# Patient Record
Sex: FEMALE | Race: BLACK OR AFRICAN AMERICAN | Employment: UNEMPLOYED | URBAN - METROPOLITAN AREA
[De-identification: names, ages, dates, MRNs, and addresses within clinical notes are randomized per-mention and may not be internally consistent; named-entity substitution may affect disease eponyms.]

---

## 2019-05-09 ENCOUNTER — HOSPITAL ENCOUNTER (OUTPATIENT)
Dept: WOMENS IMAGING | Age: 54
Discharge: HOME | End: 2019-05-09
Payer: COMMERCIAL

## 2019-05-09 DIAGNOSIS — Z12.31 ENCOUNTER FOR SCREENING MAMMOGRAM FOR BREAST CANCER: ICD-10-CM

## 2019-05-09 PROCEDURE — 77067 SCR MAMMO BI INCL CAD: CPT

## 2020-03-11 ENCOUNTER — HOSPITAL ENCOUNTER (INPATIENT)
Age: 55
LOS: 2 days | Discharge: HOME | DRG: 149 | End: 2020-03-13
Attending: EMERGENCY MEDICINE | Admitting: INTERNAL MEDICINE
Payer: COMMERCIAL

## 2020-03-11 ENCOUNTER — APPOINTMENT (OUTPATIENT)
Dept: CT IMAGING | Age: 55
DRG: 149 | End: 2020-03-11
Payer: COMMERCIAL

## 2020-03-11 ENCOUNTER — APPOINTMENT (OUTPATIENT)
Dept: GENERAL RADIOLOGY | Age: 55
DRG: 149 | End: 2020-03-11
Payer: COMMERCIAL

## 2020-03-11 PROBLEM — R42 DIZZINESS: Status: ACTIVE | Noted: 2020-03-11

## 2020-03-11 LAB
A/G RATIO: 1.2 (ref 1.1–2.2)
ALBUMIN SERPL-MCNC: 4.5 G/DL (ref 3.4–5)
ALP BLD-CCNC: 112 U/L (ref 40–129)
ALT SERPL-CCNC: 27 U/L (ref 10–40)
ANION GAP SERPL CALCULATED.3IONS-SCNC: 14 MMOL/L (ref 3–16)
APTT: 43.2 SEC (ref 24.2–36.2)
AST SERPL-CCNC: 25 U/L (ref 15–37)
BACTERIA: ABNORMAL /HPF
BASOPHILS ABSOLUTE: 0.1 K/UL (ref 0–0.2)
BASOPHILS RELATIVE PERCENT: 1.2 %
BILIRUB SERPL-MCNC: 0.7 MG/DL (ref 0–1)
BILIRUBIN URINE: ABNORMAL
BLOOD, URINE: ABNORMAL
BUN BLDV-MCNC: 11 MG/DL (ref 7–20)
CALCIUM SERPL-MCNC: 10.5 MG/DL (ref 8.3–10.6)
CHLORIDE BLD-SCNC: 98 MMOL/L (ref 99–110)
CLARITY: CLEAR
CO2: 24 MMOL/L (ref 21–32)
COLOR: ABNORMAL
COMMENT UA: ABNORMAL
CREAT SERPL-MCNC: 0.7 MG/DL (ref 0.6–1.1)
EOSINOPHILS ABSOLUTE: 0.1 K/UL (ref 0–0.6)
EOSINOPHILS RELATIVE PERCENT: 0.8 %
EPITHELIAL CELLS, UA: 8 /HPF (ref 0–5)
GFR AFRICAN AMERICAN: >60
GFR NON-AFRICAN AMERICAN: >60
GLOBULIN: 3.8 G/DL
GLUCOSE BLD-MCNC: 105 MG/DL (ref 70–99)
GLUCOSE URINE: NEGATIVE MG/DL
HCG QUALITATIVE: NEGATIVE
HCT VFR BLD CALC: 52.6 % (ref 36–48)
HEMOGLOBIN: 17.2 G/DL (ref 12–16)
HYALINE CASTS: 11 /LPF (ref 0–8)
INR BLD: 1.04 (ref 0.86–1.14)
KETONES, URINE: NEGATIVE MG/DL
LEUKOCYTE ESTERASE, URINE: NEGATIVE
LYMPHOCYTES ABSOLUTE: 1.8 K/UL (ref 1–5.1)
LYMPHOCYTES RELATIVE PERCENT: 25.4 %
MCH RBC QN AUTO: 30.5 PG (ref 26–34)
MCHC RBC AUTO-ENTMCNC: 32.7 G/DL (ref 31–36)
MCV RBC AUTO: 93 FL (ref 80–100)
MICROSCOPIC EXAMINATION: YES
MONOCYTES ABSOLUTE: 0.6 K/UL (ref 0–1.3)
MONOCYTES RELATIVE PERCENT: 9 %
MUCUS: ABNORMAL /LPF
NEUTROPHILS ABSOLUTE: 4.4 K/UL (ref 1.7–7.7)
NEUTROPHILS RELATIVE PERCENT: 63.6 %
NITRITE, URINE: NEGATIVE
PDW BLD-RTO: 14.3 % (ref 12.4–15.4)
PH UA: 5.5 (ref 5–8)
PLATELET # BLD: 309 K/UL (ref 135–450)
PMV BLD AUTO: 8.4 FL (ref 5–10.5)
POTASSIUM REFLEX MAGNESIUM: 4.7 MMOL/L (ref 3.5–5.1)
PROTEIN UA: 30 MG/DL
PROTHROMBIN TIME: 12.1 SEC (ref 10–13.2)
RBC # BLD: 5.66 M/UL (ref 4–5.2)
RBC UA: 14 /HPF (ref 0–4)
SODIUM BLD-SCNC: 136 MMOL/L (ref 136–145)
SPECIFIC GRAVITY UA: >=1.03 (ref 1–1.03)
TOTAL PROTEIN: 8.3 G/DL (ref 6.4–8.2)
TROPONIN: <0.01 NG/ML
URINE REFLEX TO CULTURE: YES
URINE TYPE: ABNORMAL
UROBILINOGEN, URINE: 1 E.U./DL
WBC # BLD: 6.9 K/UL (ref 4–11)
WBC UA: 9 /HPF (ref 0–5)

## 2020-03-11 PROCEDURE — 99285 EMERGENCY DEPT VISIT HI MDM: CPT

## 2020-03-11 PROCEDURE — 84703 CHORIONIC GONADOTROPIN ASSAY: CPT

## 2020-03-11 PROCEDURE — 71046 X-RAY EXAM CHEST 2 VIEWS: CPT

## 2020-03-11 PROCEDURE — 96374 THER/PROPH/DIAG INJ IV PUSH: CPT

## 2020-03-11 PROCEDURE — 85025 COMPLETE CBC W/AUTO DIFF WBC: CPT

## 2020-03-11 PROCEDURE — 2580000003 HC RX 258: Performed by: PHYSICIAN ASSISTANT

## 2020-03-11 PROCEDURE — 6370000000 HC RX 637 (ALT 250 FOR IP): Performed by: PHYSICIAN ASSISTANT

## 2020-03-11 PROCEDURE — 85610 PROTHROMBIN TIME: CPT

## 2020-03-11 PROCEDURE — 6370000000 HC RX 637 (ALT 250 FOR IP): Performed by: INTERNAL MEDICINE

## 2020-03-11 PROCEDURE — 2060000000 HC ICU INTERMEDIATE R&B

## 2020-03-11 PROCEDURE — 84484 ASSAY OF TROPONIN QUANT: CPT

## 2020-03-11 PROCEDURE — 87086 URINE CULTURE/COLONY COUNT: CPT

## 2020-03-11 PROCEDURE — 96361 HYDRATE IV INFUSION ADD-ON: CPT

## 2020-03-11 PROCEDURE — 93005 ELECTROCARDIOGRAM TRACING: CPT | Performed by: EMERGENCY MEDICINE

## 2020-03-11 PROCEDURE — 81001 URINALYSIS AUTO W/SCOPE: CPT

## 2020-03-11 PROCEDURE — 2580000003 HC RX 258: Performed by: INTERNAL MEDICINE

## 2020-03-11 PROCEDURE — 93005 ELECTROCARDIOGRAM TRACING: CPT | Performed by: PHYSICIAN ASSISTANT

## 2020-03-11 PROCEDURE — 80053 COMPREHEN METABOLIC PANEL: CPT

## 2020-03-11 PROCEDURE — 85730 THROMBOPLASTIN TIME PARTIAL: CPT

## 2020-03-11 PROCEDURE — 70450 CT HEAD/BRAIN W/O DYE: CPT

## 2020-03-11 PROCEDURE — 6360000002 HC RX W HCPCS: Performed by: PHYSICIAN ASSISTANT

## 2020-03-11 PROCEDURE — 6370000000 HC RX 637 (ALT 250 FOR IP): Performed by: EMERGENCY MEDICINE

## 2020-03-11 RX ORDER — SODIUM CHLORIDE 0.9 % (FLUSH) 0.9 %
10 SYRINGE (ML) INJECTION EVERY 12 HOURS SCHEDULED
Status: DISCONTINUED | OUTPATIENT
Start: 2020-03-11 | End: 2020-03-13 | Stop reason: HOSPADM

## 2020-03-11 RX ORDER — SODIUM CHLORIDE 0.9 % (FLUSH) 0.9 %
10 SYRINGE (ML) INJECTION PRN
Status: DISCONTINUED | OUTPATIENT
Start: 2020-03-11 | End: 2020-03-13 | Stop reason: HOSPADM

## 2020-03-11 RX ORDER — ONDANSETRON 2 MG/ML
4 INJECTION INTRAMUSCULAR; INTRAVENOUS EVERY 6 HOURS PRN
Status: DISCONTINUED | OUTPATIENT
Start: 2020-03-11 | End: 2020-03-13 | Stop reason: HOSPADM

## 2020-03-11 RX ORDER — NICOTINE 21 MG/24HR
1 PATCH, TRANSDERMAL 24 HOURS TRANSDERMAL DAILY
Status: DISCONTINUED | OUTPATIENT
Start: 2020-03-11 | End: 2020-03-13 | Stop reason: HOSPADM

## 2020-03-11 RX ORDER — POLYETHYLENE GLYCOL 3350 17 G/17G
17 POWDER, FOR SOLUTION ORAL DAILY PRN
Status: DISCONTINUED | OUTPATIENT
Start: 2020-03-11 | End: 2020-03-13 | Stop reason: HOSPADM

## 2020-03-11 RX ORDER — PROMETHAZINE HYDROCHLORIDE 25 MG/1
12.5 TABLET ORAL EVERY 6 HOURS PRN
Status: DISCONTINUED | OUTPATIENT
Start: 2020-03-11 | End: 2020-03-13 | Stop reason: HOSPADM

## 2020-03-11 RX ORDER — 0.9 % SODIUM CHLORIDE 0.9 %
1000 INTRAVENOUS SOLUTION INTRAVENOUS ONCE
Status: COMPLETED | OUTPATIENT
Start: 2020-03-11 | End: 2020-03-11

## 2020-03-11 RX ORDER — ATENOLOL 100 MG/1
TABLET ORAL
COMMUNITY
Start: 2020-02-27

## 2020-03-11 RX ORDER — ATENOLOL 50 MG/1
100 TABLET ORAL DAILY
Status: DISCONTINUED | OUTPATIENT
Start: 2020-03-12 | End: 2020-03-13 | Stop reason: HOSPADM

## 2020-03-11 RX ORDER — ATORVASTATIN CALCIUM 40 MG/1
40 TABLET, FILM COATED ORAL NIGHTLY
Status: DISCONTINUED | OUTPATIENT
Start: 2020-03-11 | End: 2020-03-13 | Stop reason: HOSPADM

## 2020-03-11 RX ORDER — ASPIRIN 81 MG/1
81 TABLET ORAL DAILY
Status: DISCONTINUED | OUTPATIENT
Start: 2020-03-11 | End: 2020-03-13 | Stop reason: HOSPADM

## 2020-03-11 RX ORDER — ASPIRIN 81 MG/1
324 TABLET, CHEWABLE ORAL ONCE
Status: COMPLETED | OUTPATIENT
Start: 2020-03-11 | End: 2020-03-11

## 2020-03-11 RX ORDER — ONDANSETRON 2 MG/ML
4 INJECTION INTRAMUSCULAR; INTRAVENOUS ONCE
Status: COMPLETED | OUTPATIENT
Start: 2020-03-11 | End: 2020-03-11

## 2020-03-11 RX ORDER — MECLIZINE HCL 12.5 MG/1
25 TABLET ORAL ONCE
Status: COMPLETED | OUTPATIENT
Start: 2020-03-11 | End: 2020-03-11

## 2020-03-11 RX ORDER — LABETALOL HYDROCHLORIDE 5 MG/ML
10 INJECTION, SOLUTION INTRAVENOUS EVERY 10 MIN PRN
Status: DISCONTINUED | OUTPATIENT
Start: 2020-03-11 | End: 2020-03-13 | Stop reason: HOSPADM

## 2020-03-11 RX ORDER — CLOPIDOGREL BISULFATE 75 MG/1
75 TABLET ORAL DAILY
Status: DISCONTINUED | OUTPATIENT
Start: 2020-03-11 | End: 2020-03-12

## 2020-03-11 RX ORDER — ASPIRIN 300 MG/1
300 SUPPOSITORY RECTAL DAILY
Status: DISCONTINUED | OUTPATIENT
Start: 2020-03-11 | End: 2020-03-13 | Stop reason: HOSPADM

## 2020-03-11 RX ADMIN — ONDANSETRON 4 MG: 2 INJECTION INTRAMUSCULAR; INTRAVENOUS at 12:02

## 2020-03-11 RX ADMIN — Medication 10 ML: at 22:04

## 2020-03-11 RX ADMIN — MECLIZINE 25 MG: 12.5 TABLET ORAL at 11:53

## 2020-03-11 RX ADMIN — ASPIRIN 81 MG 324 MG: 81 TABLET ORAL at 13:34

## 2020-03-11 RX ADMIN — DESMOPRESSIN ACETATE 40 MG: 0.2 TABLET ORAL at 22:04

## 2020-03-11 RX ADMIN — SODIUM CHLORIDE 1000 ML: 9 INJECTION, SOLUTION INTRAVENOUS at 12:01

## 2020-03-11 ASSESSMENT — ENCOUNTER SYMPTOMS
RESPIRATORY NEGATIVE: 1
NAUSEA: 1
COUGH: 0
PHOTOPHOBIA: 0
STRIDOR: 0
CONSTIPATION: 0
DIARRHEA: 0
ABDOMINAL PAIN: 0
COLOR CHANGE: 0
VOMITING: 0
CHEST TIGHTNESS: 0
WHEEZING: 0
BACK PAIN: 0
SHORTNESS OF BREATH: 0

## 2020-03-11 ASSESSMENT — PAIN SCALES - GENERAL: PAINLEVEL_OUTOF10: 0

## 2020-03-11 NOTE — ED NOTES
Bed: 11  Expected date:   Expected time:   Means of arrival: Walk In  Comments:     Audrey Figueroa [facility]  03/11/20 1134

## 2020-03-11 NOTE — ED PROVIDER NOTES
I personally evaluated and examined the patient in conjunction with the MICHAEL and agree with the assessment, treatment plan and disposition of the patient as recorded by the MICHAEL. I reviewed pertinent nursing notes, triage notes, vital signs, past medical history, family and social history, medications, and allergies. Complete review of systems was conducted by the MICHAEL and/or myself. Review of systems is negative except as documented in the history of present illness. Brief HPI: 78-year-old female presents the emergency department chief complaint of being off balance for the last 3 weeks. Went to see her PCP today who was concerned about posterior circulation stroke given the fact that she is quite off balance. She also reports some difficulties with coordination as well. Physical Exam: General: Patient is in no acute distress   Head: Normocephalic, atraumatic, pupils are equal and reactive to light. EOMI. Neck: Neck is supple. No JVD noted. Heart: RRR no murmurs, rubs, or gallops   Lungs: CTA BL   Abdomen: soft, non-tender, non-distended   Extremities: no lower extremity edema. Capillary refill is less than 2 seconds   Skin: no cyanosis or pallor; no rashes noted   Neuro: CN's 2-12 are grossly intact. No focal neurologic deficit appreciated. Alert and oriented ×3. Pupils are equal and reactive to light. Visual fields are tested and intact. Cranial nerves II through XII are tested and intact. No upper extremity drift. No lower extremity drift. Sensation is intact to light touch ×4 extremities. Muscle strength testing is 5/5×4 extremities. Finger to nose testing is normal. Normal intelligence and speech. According to MICHAEL patient with a very ataxic gait. Cardiac work-up with CT head ordered. Aspirin ordered. Recommend admission to the hospital for stroke work-up. EKG: EKG interpretation by ED physician: Normal axis, sinus rhythm at 68 bpm with T wave inversions from V3-V4.   No old EKG to compare this to. FINAL IMPRESSION     1. Vertigo - ro vbi    2. Abnormal EKG            Electronically signed by:   Irena Nguyen DO  03/11/20 9577

## 2020-03-11 NOTE — H&P
PO D 2/27/20  Yes Historical Provider, MD   Hydroquinone 4 % EMUL bid 4/15/15  Yes JACQUIE Stark CNP   atorvastatin (LIPITOR) 40 MG tablet TAKE 1 TABLET EVERY DAY 4/15/15  Yes JACQUIE Stark CNP   ergocalciferol (DRISDOL) 20488 UNITS capsule Take 1 capsule by mouth once a week. 12/11/13  Yes Yanique Nieto MD       Allergies:  Patient has no known allergies. Social History:      The patient currently lives home    TOBACCO:   reports that she has been smoking cigarettes. She has a 30.00 pack-year smoking history. She has never used smokeless tobacco.  ETOH:   reports current alcohol use of about 2.0 - 4.0 standard drinks of alcohol per week. E-Cigarettes Vaping or Juuling     Questions Responses    Vaping Use Never Assessed    Start Date     Does device contain nicotine? Never    Quit Date     Vaping Type             Family History:       Reviewed in detail and negative for DM, CAD, Cancer, CVA. Positive as follows:        Problem Relation Age of Onset    Stroke Mother     Cancer Father        REVIEW OF SYSTEMS:   Pertinent positives as noted in the HPI. All other systems reviewed and negative. PHYSICAL EXAM PERFORMED:    /71   Pulse 66   Temp 98.3 °F (36.8 °C) (Oral)   Resp 21   Ht 5' 4.96\" (1.65 m)   SpO2 94%   Breastfeeding No   BMI 30.22 kg/m²     General appearance:  No apparent distress, appears stated age and cooperative. HEENT:  Normal cephalic, atraumatic without obvious deformity. Pupils equal, round, and reactive to light. Extra ocular muscles intact. Conjunctivae/corneas clear. Neck: Supple, with full range of motion. No jugular venous distention. Trachea midline. Respiratory:  Normal respiratory effort. Clear to auscultation, bilaterally without Rales/Wheezes/Rhonchi. Cardiovascular:  Regular rate and rhythm with normal S1/S2 without murmurs, rubs or gallops. Abdomen: Soft, non-tender, non-distended with normal bowel sounds.   Musculoskeletal:  No clubbing, already on statin  Neurology consult  Patient symptoms can also be due to her history of alcohol consumption      History of hypertension  Continue with home medications    History of dyslipidemia  Continue statin    DVT Prophylaxis: Lovenox  Diet: Diet NPO Effective Now  Code Status: Full Code      Electronically signed by Raul Grimm MD on 3/11/2020 at 3:28 PM

## 2020-03-11 NOTE — ED PROVIDER NOTES
Denies any blood thinners. Patient states she is a smoker. Patient states she also has a history of hypertension/hyperlipidemia on medication. Patient states she has nausea when she feels dizzy but denies any vomiting. Denies chest pain, palpitations, shortness of breath, abdominal pain, numbness/tingling, lightheadedness, visual changes or other associated symptoms this time. Denies any feelings of weakness. 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 Denies any gross weakness. Nursing Notes were all reviewed and agreed with or any disagreements were addressed in the HPI. REVIEW OF SYSTEMS    (2-9 systems for level 4, 10 or more for level 5)     Review of Systems   Constitutional: Positive for activity change. Negative for appetite change, chills, diaphoresis, fatigue and fever. Eyes: Negative for photophobia and visual disturbance. Respiratory: Negative. Negative for cough, chest tightness, shortness of breath, wheezing and stridor. Cardiovascular: Negative. Negative for chest pain, palpitations and leg swelling. Gastrointestinal: Positive for nausea. Negative for abdominal pain, constipation, diarrhea and vomiting. Genitourinary: Negative for decreased urine volume, difficulty urinating, dysuria, flank pain, frequency, hematuria and urgency. Musculoskeletal: Positive for gait problem. Negative for arthralgias, back pain, joint swelling, myalgias, neck pain and neck stiffness. Skin: Negative for color change, pallor, rash and wound. Neurological: Positive for dizziness, speech difficulty and weakness. Negative for tremors, seizures, syncope, facial asymmetry, light-headedness, numbness and headaches. Positives and Pertinent negatives as per HPI. Except as noted above in the ROS, all other systems were reviewed and negative. PAST MEDICAL HISTORY     Past Medical History:   Diagnosis Date    Hyperlipidemia     Hypertension     Vitamin D deficiency          SURGICAL HISTORY   History reviewed. No pertinent surgical history. CURRENTMEDICATIONS       Previous Medications    ATENOLOL (TENORMIN) 100 MG TABLET    TK 1 T PO D    ATORVASTATIN (LIPITOR) 40 MG TABLET    TAKE 1 TABLET EVERY DAY    ERGOCALCIFEROL (DRISDOL) 47651 UNITS CAPSULE    Take 1 capsule by mouth once a week. HYDROQUINONE 4 % EMUL    bid         ALLERGIES     Patient has no known allergies. FAMILYHISTORY       Family History   Problem Relation Age of Onset    Stroke Mother     Cancer Father           SOCIAL HISTORY       Social History     Tobacco Use    Smoking status: Current Every Day Smoker     Packs/day: 1.00    Smokeless tobacco: Never Used   Substance Use Topics    Alcohol use: Yes     Comment: \"COUPLE DRINKS A WEEK\"    Drug use: No       SCREENINGS             PHYSICAL EXAM    (up to 7 for level 4, 8 or more for level 5)     ED Triage Vitals [03/11/20 1139]   BP Temp Temp src Pulse Resp SpO2 Height Weight   (!) 165/103 98.2 °F (36.8 °C) -- 74 16 98 % -- --       Physical Exam  Constitutional:       General: She is not in acute distress. Appearance: Normal appearance. She is well-developed. She is not ill-appearing, toxic-appearing or diaphoretic. HENT:      Head: Normocephalic and atraumatic. Right Ear: External ear normal.      Left Ear: External ear normal.      Mouth/Throat:      Mouth: Mucous membranes are moist.      Pharynx: No oropharyngeal exudate or posterior oropharyngeal erythema. Eyes:      General:         Right eye: No discharge. Left eye: No discharge. Extraocular Movements: Extraocular movements intact. Conjunctiva/sclera: Conjunctivae normal.      Pupils: Pupils are equal, round, and reactive to light. Neck:      Musculoskeletal: Normal range of motion and neck supple.  No neck rigidity or atelectasis         CT Head WO Contrast   Final Result   No acute intracranial abnormality. No results found. PROCEDURES   Unless otherwise noted below, none     Procedures    CRITICAL CARE TIME   N/A    CONSULTS:  IP CONSULT TO HOSPITALIST      EMERGENCY DEPARTMENT COURSE and DIFFERENTIAL DIAGNOSIS/MDM:   Vitals:    Vitals:    03/11/20 1200 03/11/20 1230 03/11/20 1300 03/11/20 1315   BP: 121/80 117/61 (!) 134/91 124/62   Pulse: 73 66 76 69   Resp: 15 12 13 18   Temp:       SpO2: 98% 94% 96% 95%       Patient was given the following medications:  Medications   aspirin chewable tablet 324 mg (has no administration in time range)   ondansetron (ZOFRAN) injection 4 mg (4 mg Intravenous Given 3/11/20 1202)   0.9 % sodium chloride bolus (0 mLs Intravenous Stopped 3/11/20 1315)   meclizine (ANTIVERT) tablet 25 mg (25 mg Oral Given 3/11/20 1153)       Patient is a 53-year-old female who presents to the ED with complaint of balance problems, slurred speech, dizziness and nausea. Symptoms ongoing for the past 3 weeks. Initially seen by PCP 3 weeks ago and diagnosed with peripheral dizziness. Was sent for physical therapy and completed 3 sessions of physical therapy for dizziness with no improvement. Has been on Zofran. Worsening symptoms with now slurred speech and saw PCP today who sent to the ED for evaluation of potential posterior cerebellar stroke. Did speak with PCP prior to patient's arrival.  PCP requested patient be admitted for further evaluation of stroke. Patient given Antivert, Zofran and fluids here in the ED for empiric treatment given complaints. CBC showed normal white count and platelets. Hemoglobin 17.2. CMP unremarkable. Troponin normal.  Coags obtained. Pregnancy negative. Chest x-ray showed bibasilar atelectasis. CT of the head showed no acute intracranial abnormality. Symptoms ongoing for the past 3 weeks and do not believe CTA of the head or neck indicated at this time. Believe patient does require admission for further evaluation of potential posterior circulation etiology and CVA. Believe patient would benefit from mission for MRI for further evaluation. Will discuss with hospitalist service for admission. FINAL IMPRESSION      1. Vertigo - ro vbi          DISPOSITION/PLAN   DISPOSITION Decision To Admit 03/11/2020 01:23:52 PM      PATIENT REFERREDTO:  No follow-up provider specified.     DISCHARGE MEDICATIONS:  New Prescriptions    No medications on file       DISCONTINUED MEDICATIONS:  Discontinued Medications    ATENOLOL (TENORMIN) 50 MG TABLET    TAKE 1 TABLET EVERY DAY              (Please note that portions of this note were completed with a voice recognition program.  Efforts were made to edit the dictations but occasionally words are mis-transcribed.)    ALBA Suero (electronically signed)          ALBA Pang  03/11/20 9029

## 2020-03-12 ENCOUNTER — APPOINTMENT (OUTPATIENT)
Dept: MRI IMAGING | Age: 55
DRG: 149 | End: 2020-03-12
Payer: COMMERCIAL

## 2020-03-12 LAB
CHOLESTEROL, TOTAL: 226 MG/DL (ref 0–199)
EKG ATRIAL RATE: 68 BPM
EKG ATRIAL RATE: 92 BPM
EKG DIAGNOSIS: NORMAL
EKG DIAGNOSIS: NORMAL
EKG P AXIS: 75 DEGREES
EKG P AXIS: 76 DEGREES
EKG P-R INTERVAL: 196 MS
EKG P-R INTERVAL: 200 MS
EKG Q-T INTERVAL: 238 MS
EKG Q-T INTERVAL: 450 MS
EKG QRS DURATION: 82 MS
EKG QRS DURATION: 84 MS
EKG QTC CALCULATION (BAZETT): 352 MS
EKG QTC CALCULATION (BAZETT): 478 MS
EKG R AXIS: 66 DEGREES
EKG R AXIS: 80 DEGREES
EKG T AXIS: 43 DEGREES
EKG T AXIS: 73 DEGREES
EKG VENTRICULAR RATE: 132 BPM
EKG VENTRICULAR RATE: 68 BPM
ESTIMATED AVERAGE GLUCOSE: 93.9 MG/DL
HBA1C MFR BLD: 4.9 %
HCT VFR BLD CALC: 46.6 % (ref 36–48)
HDLC SERPL-MCNC: 40 MG/DL (ref 40–60)
HEMOGLOBIN: 15.1 G/DL (ref 12–16)
LDL CHOLESTEROL CALCULATED: 164 MG/DL
MCH RBC QN AUTO: 30.1 PG (ref 26–34)
MCHC RBC AUTO-ENTMCNC: 32.5 G/DL (ref 31–36)
MCV RBC AUTO: 92.8 FL (ref 80–100)
PDW BLD-RTO: 14.4 % (ref 12.4–15.4)
PLATELET # BLD: 234 K/UL (ref 135–450)
PMV BLD AUTO: 8.5 FL (ref 5–10.5)
RBC # BLD: 5.02 M/UL (ref 4–5.2)
TRIGL SERPL-MCNC: 108 MG/DL (ref 0–150)
URINE CULTURE, ROUTINE: NORMAL
VLDLC SERPL CALC-MCNC: 22 MG/DL
WBC # BLD: 4.9 K/UL (ref 4–11)

## 2020-03-12 PROCEDURE — 97162 PT EVAL MOD COMPLEX 30 MIN: CPT

## 2020-03-12 PROCEDURE — 92523 SPEECH SOUND LANG COMPREHEN: CPT

## 2020-03-12 PROCEDURE — 93010 ELECTROCARDIOGRAM REPORT: CPT | Performed by: INTERNAL MEDICINE

## 2020-03-12 PROCEDURE — 97116 GAIT TRAINING THERAPY: CPT

## 2020-03-12 PROCEDURE — 2060000000 HC ICU INTERMEDIATE R&B

## 2020-03-12 PROCEDURE — 97530 THERAPEUTIC ACTIVITIES: CPT

## 2020-03-12 PROCEDURE — 85027 COMPLETE CBC AUTOMATED: CPT

## 2020-03-12 PROCEDURE — 92610 EVALUATE SWALLOWING FUNCTION: CPT

## 2020-03-12 PROCEDURE — 6370000000 HC RX 637 (ALT 250 FOR IP): Performed by: PHYSICIAN ASSISTANT

## 2020-03-12 PROCEDURE — 83036 HEMOGLOBIN GLYCOSYLATED A1C: CPT

## 2020-03-12 PROCEDURE — 80061 LIPID PANEL: CPT

## 2020-03-12 PROCEDURE — 94760 N-INVAS EAR/PLS OXIMETRY 1: CPT

## 2020-03-12 PROCEDURE — 6370000000 HC RX 637 (ALT 250 FOR IP): Performed by: INTERNAL MEDICINE

## 2020-03-12 PROCEDURE — 99222 1ST HOSP IP/OBS MODERATE 55: CPT | Performed by: PSYCHIATRY & NEUROLOGY

## 2020-03-12 PROCEDURE — 36415 COLL VENOUS BLD VENIPUNCTURE: CPT

## 2020-03-12 PROCEDURE — 2580000003 HC RX 258: Performed by: INTERNAL MEDICINE

## 2020-03-12 PROCEDURE — 70551 MRI BRAIN STEM W/O DYE: CPT

## 2020-03-12 RX ORDER — MECLIZINE HCL 12.5 MG/1
25 TABLET ORAL 3 TIMES DAILY PRN
Status: DISCONTINUED | OUTPATIENT
Start: 2020-03-12 | End: 2020-03-13

## 2020-03-12 RX ADMIN — Medication 10 ML: at 20:44

## 2020-03-12 RX ADMIN — Medication 10 ML: at 08:19

## 2020-03-12 RX ADMIN — DESMOPRESSIN ACETATE 40 MG: 0.2 TABLET ORAL at 20:43

## 2020-03-12 RX ADMIN — ASPIRIN 81 MG: 81 TABLET, COATED ORAL at 08:18

## 2020-03-12 RX ADMIN — ATENOLOL 100 MG: 50 TABLET ORAL at 08:18

## 2020-03-12 RX ADMIN — CLOPIDOGREL 75 MG: 75 TABLET, FILM COATED ORAL at 08:18

## 2020-03-12 RX ADMIN — MECLIZINE 25 MG: 12.5 TABLET ORAL at 09:48

## 2020-03-12 NOTE — PROGRESS NOTES
Occupational Therapy   Occupational Therapy Initial Assessment  Date: 3/12/2020   Patient Name: Fadia Rooney  MRN: [id number]     : 1965    Date of Service: 3/12/2020    Discharge Recommendations:Azalea Hidalgo scored a 17/24 on the AM-PAC ADL Inpatient form. Current research shows that an AM-PAC score of 17 or less is typically not associated with a discharge to the patient's home setting. Based on the patients AM-PAC score and their current ADL deficits, it is recommended that the patient have 5-7 sessions per week of Occupational Therapy at d/c to increase the patients independence. Patient with decreased safety awareness, recommend 24 hour supervision  If patient discharges prior to next session this note will serve as a discharge summary. Please see below for the latest assessment towards goals. OT Equipment Recommendations  Equipment Needed: No    Assessment   Performance deficits / Impairments: Decreased functional mobility ; Decreased ADL status; Decreased cognition;Decreased safe awareness;Decreased endurance;Decreased balance;Decreased high-level IADLs  Assessment: Patient presents with the above deficits, which are below baseline, and would benefit from continued skilled OT  Treatment Diagnosis: Decreased ADLs, IADLs, transfers, mobility associated with Dizziness  Prognosis: Good;Fair  Decision Making: Medium Complexity  History: ETOH, alone during day  Exam: as above  Assistance / Modification: decreased safety awareness, dizziness  OT Education: OT Role;Plan of Care;ADL Adaptive Strategies;IADL Safety;Equipment;Transfer Training  Patient Education: Eval, discharge recommendations--patient with decreased safety awareness, resistant to recommendations  REQUIRES OT FOLLOW UP: Yes  Activity Tolerance  Activity Tolerance: Treatment limited secondary to decreased cognition  Safety Devices  Safety Devices in place: Yes  Type of devices:  All fall risk precautions in place;Call light within reach;Chair alarm in place; Patient at risk for falls;Nurse notified; Left in chair;Gait belt           Patient Diagnosis(es): The primary encounter diagnosis was Vertigo - ro vbi. A diagnosis of Abnormal EKG was also pertinent to this visit. has a past medical history of Hyperlipidemia, Hypertension, and Vitamin D deficiency. has a past surgical history that includes Ankle surgery (Left) and Colonoscopy. Treatment Diagnosis: Decreased ADLs, IADLs, transfers, mobility associated with Dizziness      Restrictions  Restrictions/Precautions  Restrictions/Precautions: Fall Risk(High fall risk)    Subjective   General  Chart Reviewed:  Yes  Additional Pertinent Hx: ETOH  Family / Caregiver Present: No  Diagnosis: Dizziness  Subjective  Subjective: Patient long sitting in bed upon arrival, agreeable to evaluation  Patient Currently in Pain: Denies  Vital Signs  Temp: 98 °F (36.7 °C)  Temp Source: Oral  Pulse: 59  Heart Rate Source: Monitor  Resp: 16  BP: 115/76  BP Location: Right upper arm  BP Upper/Lower: Upper  Patient Position: Up in chair  Level of Consciousness: Alert  MEWS Score: 1  Patient Currently in Pain: Denies  Oxygen Therapy  SpO2: 98 %  Pulse Oximeter Device Mode: Intermittent  Pulse Oximeter Device Location: Finger  O2 Device: None (Room air)  Social/Functional History  Social/Functional History  Lives With: Spouse  Type of Home: House  Home Layout: Two level(Bi-Level)  Home Access: Stairs to enter with rails  Entrance Stairs - Number of Steps: 6+6  Entrance Stairs - Rails: Both  Bathroom Shower/Tub: Tub/Shower unit  Bathroom Toilet: Handicap height  Receives Help From: Family  ADL Assistance: Independent  Homemaking Assistance: Independent  Ambulation Assistance: Independent  Transfer Assistance: Independent  Active : Yes  Mode of Transportation: Truck  Occupation: Retired  Leisure & Hobbies: TV  Additional Comments: Falls: 1 fall in the last 3 months (within the last couple weeks) Objective   Vision: Impaired  Vision Exceptions: Wears glasses at all times  Hearing: Within functional limits    Orientation  Overall Orientation Status: Within Normal Limits     Balance  Sitting Balance: Contact guard assistance  Standing Balance: Minimal assistance(modA without AD, Kindra with RW)  Standing Balance  Time: ~125 feet with RW and Kindra (cues for proximity, patient having difficulty with navigation/way finding --unable to locate room)  Wheelchair Bed Transfers  Wheelchair/Bed - Technique: Ambulating  Equipment Used: Bed;Other  Level of Asssistance: Minimal assistance(with RW (cues for safety, proximity to RW, patient impulsive))  ADL  Feeding: None  Grooming: Setup  LE Dressing: Contact guard assistance(seated EOB)  Toileting: None  Tone RUE  RUE Tone: Normotonic  Tone LUE  LUE Tone: Normotonic  Coordination  Movements Are Fluid And Coordinated: Yes     Bed mobility  Supine to Sit: Contact guard assistance  Sit to Supine: Contact guard assistance  Scooting: Stand by assistance  Transfers  Sit to stand: Minimal assistance  Stand to sit: Minimal assistance  Vision - Basic Assessment  Prior Vision: Wears glasses all the time  Patient Visual Report: (No visual changes noted)  Cognition  Overall Cognitive Status: Exceptions  Arousal/Alertness: Appropriate responses to stimuli  Following Commands:  Follows one step commands with repetition(due to impulsivity)  Safety Judgement: Decreased awareness of need for assistance;Decreased awareness of need for safety  Problem Solving: Decreased awareness of errors;Assistance required to correct errors made;Assistance required to identify errors made;Assistance required to implement solutions;Assistance required to generate solutions  Insights: Decreased awareness of deficits  Initiation: Requires cues for some  Sequencing: Requires cues for some  Perception  Overall Perceptual Status: WFL     Sensation  Overall Sensation Status: WFL        LUE AROM (degrees)  LUE

## 2020-03-12 NOTE — CONSULTS
Comment: clairekierstenleonid daily    Drug use: No     No Known Allergies  Current Facility-Administered Medications   Medication Dose Route Frequency Provider Last Rate Last Dose    meclizine (ANTIVERT) tablet 25 mg  25 mg Oral TID PRN Kimber Lindsey PA-C   25 mg at 03/12/20 0948    atenolol (TENORMIN) tablet 100 mg  100 mg Oral Daily Stevan JONES MD   100 mg at 03/12/20 0818    atorvastatin (LIPITOR) tablet 40 mg  40 mg Oral Nightly Stevan JONES MD   40 mg at 03/11/20 2204    sodium chloride flush 0.9 % injection 10 mL  10 mL Intravenous 2 times per day Asif JONES MD   10 mL at 03/12/20 0819    sodium chloride flush 0.9 % injection 10 mL  10 mL Intravenous PRN Dmytro Rudi Brittle, MD        polyethylene glycol (GLYCOLAX) packet 17 g  17 g Oral Daily PRN Dmytro Rudi Brittle, MD        promethazine (PHENERGAN) tablet 12.5 mg  12.5 mg Oral Q6H PRN Stevan JONES MD        Or    ondansetron (ZOFRAN) injection 4 mg  4 mg Intravenous Q6H PRN Stevan JONES MD        enoxaparin (LOVENOX) injection 40 mg  40 mg Subcutaneous Daily Stevan JONES MD        aspirin EC tablet 81 mg  81 mg Oral Daily Stevan JONES MD   81 mg at 03/12/20 0818    Or    aspirin suppository 300 mg  300 mg Rectal Daily Dmytro Rudi Brittle, MD        nicotine (NICODERM CQ) 14 MG/24HR 1 patch  1 patch Transdermal Daily Dmytro Rudi Brittle, MD   Stopped at 03/11/20 2017    labetalol (NORMODYNE;TRANDATE) injection 10 mg  10 mg Intravenous Q10 Min PRN Stevan JONES MD           ROS : A 10-14 system review of constitutional, cardiovascular, respiratory, eyes, musculoskeletal, endocrine, GI, ENT, skin, hematological, genitourinary, psychiatric and neurologic systems was obtained and updated today and is unremarkable except as mentioned in my HPI      Exam:     Constitutional:   Vitals:    03/12/20 0722 03/12/20 0805 03/12/20 1158 03/12/20 1219   BP:  128/78  115/76   Pulse:  65  59   Resp:  16 16 16   Temp:  97.8 °F (36.6 °C)  98 °F (36.7 °C) Lab Results   Component Value Date    INR 1.04 03/11/2020    PROTIME 12.1 03/11/2020       Neuroimaging were independently reviewed by myself and discussed results with the patient  Reviewed notes from different physicians  Reviewed lab and blood testing    Impression:  Acute dizziness and vertigo. Severe. Possible benign paroxysmal vertigo or peripheral vertigo. Less likely central.  Recent MRI brain showed no acute findings. Hypertension, not controlled  Hyperlipidemia  Smoking    Recommendation:     Aspirin  Statin  Continue current blood pressure medication  Blood pressure monitor  PT and OT for vestibular training  Telemetry  DVT and GI prophylaxis  Nicotine patch  Stroke prevention was discussed with the patient  Discussed risk of vertigo, recurrence and driving precautions  Can use meclizine only as needed for prolonged attacks  Can be discharged from neurology if medically stable  Follow-up outpatient with OT if symptoms persist or recur  No further recommendation  We will sign off. Thank you for referring such patient. If you have any questions regarding my consult note, please don't hesitate to call me. Mercedes Vallejo MD  [phone]    This dictation was generated by voice recognition computer software.  Although all attempts are made to edit the dictation for accuracy, there may be errors in the  transcription that are not intended

## 2020-03-12 NOTE — PROGRESS NOTES
[facility] PROGRESS NOTE    3/12/2020 1:27 PM        Name: Modesto Sheppard . Admitted: 3/11/2020  Primary Care Provider: Pipe Kirby MD (Tel: [phone])      Subjective:  . Patient seen earlier this afternoon. She had meclizine this am. She states she feels 75% better since admission although therapy had just worked with her and said she was very unsteady. 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 She has been going to vestibular rehab x 3 visits. She drinks 2-4 shots a day but not every day. She denies tremors. She is having occasional nausea. Reviewed interval ancillary notes    Current Medications  meclizine (ANTIVERT) tablet 25 mg, TID PRN  atenolol (TENORMIN) tablet 100 mg, Daily  atorvastatin (LIPITOR) tablet 40 mg, Nightly  sodium chloride flush 0.9 % injection 10 mL, 2 times per day  sodium chloride flush 0.9 % injection 10 mL, PRN  polyethylene glycol (GLYCOLAX) packet 17 g, Daily PRN  promethazine (PHENERGAN) tablet 12.5 mg, Q6H PRN    Or  ondansetron (ZOFRAN) injection 4 mg, Q6H PRN  enoxaparin (LOVENOX) injection 40 mg, Daily  aspirin EC tablet 81 mg, Daily    Or  aspirin suppository 300 mg, Daily  nicotine (NICODERM CQ) 14 MG/24HR 1 patch, Daily  labetalol (NORMODYNE;TRANDATE) injection 10 mg, Q10 Min PRN        Objective:  /76   Pulse 59   Temp 98 °F (36.7 °C) (Oral)   Resp 16   Ht 5' 4\" (1.626 m)   Wt 150 lb (68 kg)   SpO2 98%   Breastfeeding No   BMI 25.75 kg/m²     Intake/Output Summary (Last 24 hours) at 3/12/2020 1327  Last data filed at 3/12/2020 1019  Gross per 24 hour   Intake 480 ml   Output 300 ml   Net 180 ml      Wt Readings from Last 3 Encounters:   03/12/20 150 lb (68 kg)   04/15/15 181 lb 6.4 oz (82.3 kg)   07/01/14 178 lb (80.7 kg)       General appearance:  Appears comfortable  Eyes: Sclera clear. Pupils equal.  ENT: Moist oral mucosa.  Trachea midline, no

## 2020-03-12 NOTE — PROGRESS NOTES
discharge  Short term goal 1: Pt will complete bed mobility with mod I  Short term goal 2: Pt will complete sit to/from stand with SBA  Short term goal 3: Pt will ambulate 100 ft with LRAD and CGA       Therapy Time   Individual Concurrent Group Co-treatment   Time In 1030         Time Out 1108         Minutes 38         Timed Code Treatment Minutes: [address], PT   Ana M Zhang, PT, DPT, [id number]

## 2020-03-12 NOTE — CARE COORDINATION
CM spoke w/ patient re: d/c. Per PT/OT/Speech recommendations. [facility] of choice [facility] list provided to patient. Patient amenable to [facility]. Referral sent. Case management will continue to follow progress and update discharge plan as needed.     Taylor Vieyra BSN, .[phone]

## 2020-03-12 NOTE — PROGRESS NOTES
Shift assessment complete, meds given whole with water. Pt has been very unsteady when ambulating, holds onto you or the wall. Is a high fall risk with alarm in place. Pt has some odd hand movements at times and shaking. Still getting an NIH:0. I saw noted in the ER Drs note that the pt is a daily vodka drinker. We have no CIWA protocol ordered. Will monitor pt for signs of withdrawal. Pt has been alert and oriented x4, calm and cooperative. Vitals have been stable. No other concerns, will continue to monitor.

## 2020-03-12 NOTE — PROGRESS NOTES
Slightly reduced laryngeal elevation assessed upon manual palpation. Pt demonstrated effective mastication and adequate oral clearance. No overt clinical s/s of aspiration/penetration noted across textures. At this time, recommend a regular texture diet with thin liquids. Dietary Recommendations:   Recommend Regular Texture diet with Thin Liquids, meds as tolerated     Strategies: 90 degree positioning with all p.o. intake; small bites/sips; alternate textures through meal; reduce rate of intake; No straws     Dysphagia Treatment/Goals: Speech therapy for dysphagia tx 1 time to ensure diet tolerance. ST.) Pt will tolerate recommended diet without s/s of aspiration     SPEECH LANGUAGE EVALUATION:  Speech Language Treatment Diagnosis: Mild dysarthria    Speech Language Impressions: Pt alert and cooperative. Pt presents with a mild dysarthria characterized by reduced articulatory precision. Pt relatively intelligible in conversation  Pt reports increased slurring last week with improvement this date, however still not at her baseline.      Oral Motor exam/Motor Speech:   - OME was grossly within functional limits    - Slightly hyponasal vocal quality    - Mildly reduced articulatory precision at the word, sentence and conversation level    - Verbal speech task (mama-mama, huckleberry-huckleberry): mild difficulty at the word level   - Overall functionally intelligible     Verbal Expression:    - Complex verbal task: no noted difficulty with verbal expression     - pt completed with 100% (5/5)    - Appears within functional limits     Auditory Comprehension:    - Pt responses are appropriate and timely in structured conversation    - Pt independently following commands for evaluation    - 1-step commands: 2/2     - 2-step commands: 6/6     - 3-step commands: 2/2    - Appears within functional limits     Cognitive-Linguistic:    - Oriented: self, date, facility, medical situation    - Pt able to report

## 2020-03-13 VITALS
BODY MASS INDEX: 25.22 KG/M2 | SYSTOLIC BLOOD PRESSURE: 152 MMHG | DIASTOLIC BLOOD PRESSURE: 87 MMHG | RESPIRATION RATE: 16 BRPM | WEIGHT: 147.7 LBS | HEIGHT: 64 IN | TEMPERATURE: 97.2 F | HEART RATE: 63 BPM | OXYGEN SATURATION: 95 %

## 2020-03-13 PROCEDURE — 2500000003 HC RX 250 WO HCPCS: Performed by: PHYSICIAN ASSISTANT

## 2020-03-13 PROCEDURE — 6360000002 HC RX W HCPCS: Performed by: PHYSICIAN ASSISTANT

## 2020-03-13 PROCEDURE — 97530 THERAPEUTIC ACTIVITIES: CPT

## 2020-03-13 PROCEDURE — 2580000003 HC RX 258: Performed by: INTERNAL MEDICINE

## 2020-03-13 PROCEDURE — 92507 TX SP LANG VOICE COMM INDIV: CPT

## 2020-03-13 PROCEDURE — 97535 SELF CARE MNGMENT TRAINING: CPT

## 2020-03-13 PROCEDURE — 99232 SBSQ HOSP IP/OBS MODERATE 35: CPT | Performed by: PSYCHIATRY & NEUROLOGY

## 2020-03-13 PROCEDURE — 96375 TX/PRO/DX INJ NEW DRUG ADDON: CPT

## 2020-03-13 PROCEDURE — 2580000003 HC RX 258: Performed by: PHYSICIAN ASSISTANT

## 2020-03-13 PROCEDURE — 6370000000 HC RX 637 (ALT 250 FOR IP): Performed by: INTERNAL MEDICINE

## 2020-03-13 PROCEDURE — 6370000000 HC RX 637 (ALT 250 FOR IP): Performed by: PHYSICIAN ASSISTANT

## 2020-03-13 RX ORDER — MECLIZINE HCL 12.5 MG/1
25 TABLET ORAL 3 TIMES DAILY
Status: DISCONTINUED | OUTPATIENT
Start: 2020-03-13 | End: 2020-03-13 | Stop reason: HOSPADM

## 2020-03-13 RX ORDER — THIAMINE MONONITRATE (VIT B1) 100 MG
100 TABLET ORAL DAILY
Qty: 7 TABLET | Refills: 0 | Status: ON HOLD | OUTPATIENT
Start: 2020-03-13 | End: 2022-05-10 | Stop reason: CLARIF

## 2020-03-13 RX ORDER — MECLIZINE HYDROCHLORIDE 25 MG/1
25 TABLET ORAL 3 TIMES DAILY PRN
Qty: 21 TABLET | Refills: 0 | Status: SHIPPED | OUTPATIENT
Start: 2020-03-13 | End: 2020-03-13

## 2020-03-13 RX ORDER — MECLIZINE HYDROCHLORIDE 25 MG/1
25 TABLET ORAL 3 TIMES DAILY PRN
Qty: 21 TABLET | Refills: 0 | Status: SHIPPED | OUTPATIENT
Start: 2020-03-13 | End: 2020-03-20

## 2020-03-13 RX ADMIN — MECLIZINE 25 MG: 12.5 TABLET ORAL at 13:34

## 2020-03-13 RX ADMIN — Medication 10 ML: at 09:35

## 2020-03-13 RX ADMIN — FOLIC ACID: 5 INJECTION, SOLUTION INTRAMUSCULAR; INTRAVENOUS; SUBCUTANEOUS at 15:02

## 2020-03-13 RX ADMIN — ASPIRIN 81 MG: 81 TABLET, COATED ORAL at 09:34

## 2020-03-13 RX ADMIN — MECLIZINE 25 MG: 12.5 TABLET ORAL at 09:35

## 2020-03-13 RX ADMIN — ATENOLOL 100 MG: 50 TABLET ORAL at 09:35

## 2020-03-13 ASSESSMENT — PAIN SCALES - GENERAL
PAINLEVEL_OUTOF10: 0

## 2020-03-13 NOTE — DISCHARGE INSTR - COC
Continuity of Care Form    Patient Name: Conchita Silva   :  1965  MRN:  [id number]    Admit date:  3/11/2020  Discharge date:  3/13/2020    Code Status Order: Full Code   Advance Directives:   Advance Care Flowsheet Documentation     Date/Time Healthcare Directive Type of Healthcare Directive Copy in [address] Agent's Name Healthcare Agent's Phone Number    20 1500  No, patient does not have an advance directive for healthcare treatment -- -- -- -- --          Admitting Physician:  Rosa Corrales MD  PCP: Massimo Mesa MD    Discharging Nurse: Rc Paz 23 Unit/Room#: 4ZT-4934/9917-99  Discharging Unit Phone Number: [phone]    Emergency Contact:   Extended Emergency Contact Information  Primary Emergency Contact: [facility] Phone: [phone]  Relation: Brother/Sister  Secondary Emergency Contact: [facility] Phone: [phone]  Relation: Spouse    Past Surgical History:  Past Surgical History:   Procedure Laterality Date    ANKLE SURGERY Left     COLONOSCOPY         Immunization History: There is no immunization history on file for this patient.     Active Problems:  Patient Active Problem List   Diagnosis Code    HTN (hypertension), benign I10    Hyperlipidemia E78.5    Vitamin D deficiency E55.9    Vertigo R42    Dyslipidemia E78.5       Isolation/Infection:   Isolation          No Isolation        Patient Infection Status     None to display          Nurse Assessment:  Last Vital Signs: /86   Pulse 68   Temp 98.2 °F (36.8 °C) (Oral)   Resp 16   Ht 5' 4\" (1.626 m)   Wt 147 lb 11.2 oz (67 kg)   SpO2 93%   Breastfeeding No   BMI 25.35 kg/m²     Last documented pain score (0-10 scale): Pain Level: 0  Last Weight:   Wt Readings from Last 1 Encounters:   20 147 lb 11.2 oz (67 kg)     Mental Status:  oriented, alert, coherent, logical, thought processes intact and able to concentrate and follow conversation    IV Access:  - None    Nursing Mobility/ADLs:  Walking   Assisted  Transfer  Independent  Bathing  Independent  Dressing  Independent  [address]  Independent  Med Delivery   whole    Wound Care Documentation and Therapy:        Elimination:  Continence:   · Bowel: Yes  · Bladder: Yes  Urinary Catheter: None   Colostomy/Ileostomy/Ileal Conduit: No       Date of Last BM: 3/12/2020    Intake/Output Summary (Last 24 hours) at 3/13/2020 0835  Last data filed at 3/12/2020 1019  Gross per 24 hour   Intake 360 ml   Output --   Net 360 ml     I/O last 3 completed shifts: In: 360 [P.O.:360]  Out: -     Safety Concerns:     History of Falls (last 30 days) and At Risk for Falls    Impairments/Disabilities:      None    Nutrition Therapy:  Current Nutrition Therapy:   - Oral Diet:  General    Routes of Feeding: Oral  Liquids: Thin Liquids  Daily Fluid Restriction: no  Last Modified Barium Swallow with Video (Video Swallowing Test): not done    Treatments at the Time of Hospital Discharge:   Respiratory Treatments: n/a  Oxygen Therapy:  is not on home oxygen therapy.   Ventilator:    - No ventilator support    Rehab Therapies: SN,PT,OT  Weight Bearing Status/Restrictions: No weight bearing restirctions  Other Medical Equipment (for information only, NOT a DME order):  walker  Other Treatments: n/a    Patient's personal belongings (please select all that are sent with patient):  taylor Mercado RN SIGNATURE:  Electronically signed by Celestine Pineda RN on 3/13/20 at 5:08 PM EDT    CASE MANAGEMENT/SOCIAL WORK SECTION    Inpatient Status Date: ***    Readmission Risk Assessment Score:  Readmission Risk              Risk of Unplanned Readmission:        8           Discharging to Facility/ [address] [place]       Phone -[phone]               Dialysis Facility (if applicable) · Name:  · Address:  · Dialysis Schedule:  · Phone:  · Fax:    / signature: {Esignature:[id number]}    PHYSICIAN SECTION    Prognosis: Good    Condition at Discharge: Stable    Rehab Potential (if transferring to Rehab): Good    Recommended Labs or Other Treatments After Discharge: follow up with PCP in one week    Physician Certification: I certify the above information and transfer of Caryn Eldridge  is necessary for the continuing treatment of the diagnosis listed and that she requires Home Care for less 30 days.      Update Admission H&P: No change in H&P    PHYSICIAN SIGNATURE:  Electronically signed by Jean-Paul Miner PA-C / Jacklynn Ahumada MD on 3/13/20 at 4:46 PM EDT

## 2020-03-13 NOTE — PLAN OF CARE
Problem: Falls - Risk of:  Goal: Will remain free from falls  Description: Will remain free from falls  Outcome: Ongoing  Note: After meclizine, pt stated feeling a little less dizzy     Problem: HEMODYNAMIC STATUS  Goal: Patient has stable vital signs and fluid balance  Outcome: Ongoing     Problem: ACTIVITY INTOLERANCE/IMPAIRED MOBILITY  Goal: Mobility/activity is maintained at optimum level for patient  Outcome: Ongoing     Problem: COMMUNICATION IMPAIRMENT  Goal: Ability to express needs and understand communication  Outcome: Ongoing     Problem: Pain:  Goal: Control of acute pain  Description: Control of acute pain  Outcome: Ongoing   Pt sitting in bed. Stated that she would be willing to go to inpatient rehab. Discussed with MD. Order placed. Denies any pain or SOB. Still dizzy during ambulation but not as much after the meclizine. Call light within reach. Bed locked in lowest position.

## 2020-03-13 NOTE — PROGRESS NOTES
Intake/Output Summary (Last 24 hours) at 3/13/2020 1356  Last data filed at 3/13/2020 0934  Gross per 24 hour   Intake 10 ml   Output --   Net 10 ml      Wt Readings from Last 3 Encounters:   03/13/20 147 lb 11.2 oz (67 kg)   04/15/15 181 lb 6.4 oz (82.3 kg)   07/01/14 178 lb (80.7 kg)       General appearance:  Appears comfortable  Eyes: Sclera clear. Pupils equal.  ENT: Moist oral mucosa. Trachea midline, no adenopathy. Cardiovascular: Regular rhythm, normal S1, S2. No murmur. No edema in lower extremities  Respiratory: Not using accessory muscles. Good inspiratory effort. Clear to auscultation bilaterally, no wheeze or crackles. GI: Abdomen soft, no tenderness, not distended, normal bowel sounds  Musculoskeletal: No cyanosis in digits, neck supple  Neurology: CN 2-12 grossly intact. No speech or motor deficits  Psych: Normal affect. Alert and oriented in time, place and person  Skin: Warm, dry, normal turgor    Labs and Tests:  CBC:   Recent Labs     03/11/20  1205 03/12/20  0453   WBC 6.9 4.9   HGB 17.2* 15.1    234     BMP:    Recent Labs     03/11/20  1205      K 4.7   CL 98*   CO2 24   BUN 11   CREATININE 0.7   GLUCOSE 105*     Hepatic:   Recent Labs     03/11/20  1205   AST 25   ALT 27   BILITOT 0.7   ALKPHOS 112     CT head  No acute intracranial abnormality    MRI brain  1. No acute intracranial abnormality.  No acute infarct. 2. Mild chronic microvascular ischemic changes. Problem List  Active Problems:    HTN (hypertension), benign    Vertigo    Dyslipidemia  Resolved Problems:    * No resolved hospital problems. *       Assessment & Plan:   1. Vertigo-MRI negative for cerebellar stroke. Will schedule meclizine tid for now. Neuro has been consulted and suspects vertigo. ? Laura given alcoholism. PT/OT already seen. She is interested in ARU. Will consult them (they would not be seeing her until Monday). Discussed with Dr Mayte Olvera who will see patient.   2. Alcohol use-monitor for withdrawal. No signs of withdrawal. Will give banana bag as she said fluids helped her in ED. 3. Hyperlipidemia-on lipitor  4. Hypertension-controlled on b blocker.        Diet: DIET GENERAL;  Code:Full Code  DVT PPX: russ Rivera PA-C   3/13/2020 1:56 PM

## 2020-03-13 NOTE — DISCHARGE SUMMARY
patient. Follow Up. PCP in 1 week   Disposition. home  Activity. Activity as tolerated but no driving until symptoms improved. Diet: DIET GENERAL;      Spent 35 minutes in discharge process. Signed:   Crow Ramirez PA-C     3/13/2020 4:52 PM

## 2020-03-13 NOTE — PROGRESS NOTES
Speech  Language And Dysphagia Treatment Note    Name: Yury Pugh  : 1965  Medical Diagnosis: Dizziness [R42]  Dizziness [R42]  Treatment Diagnosis: Minimal Dysphagia, Mild Dysarthria   Pain: Pt did not report pain. Patient's response to therapy:  Pt alert and cooperative. Dysphagia Treatment:   Current Diet Level: Regular Texture diet with Thin Liquids, meds as tolerated     Tolerance of Current Diet Level: Per chart review and pt report, no noted difficulty with current diet. Assessment of Texture Tolerance:  -Impressions: Pt sitting upright in bed upon SLP arrival, provided with thin liquids and regular solids. Pt appeared tolerating of thin liquids via successive drinks from cup with no overt clinical symptoms of aspiration/penetration. Regular solids revealed adequate mastication, propulsion and oral clearance. Slightly reduced laryngeal elevation assessed upon manual palpation. Overall, swallowing mechanism appears functional, recommend regular texture diet with thin liquids and discharge from dysphagia services. Dysphagia Goals, addressed this date:  1.) Pt will tolerate recommended diet without s/s of aspiration GOAL MET 20    Diet and Treatment Recommendations:  Recommend Regular Texture diet with Thin Liquids, meds as tolerated    Speech Language Treatment:  Impressions: Pt alert and cooperative. Pt and family report improvement in speech deficits. Pt rated speech 7/10 with 10 being baseline. Pt continues to demonstrate reduced articulatory precision at the word level and in connected speech however it appears to have improved since evaluation. Speech Language STG, addressed this date: 1. Pt will improve articulatory precision at the word level and in connected speech via graded tasks to 90%   - Reduced articulatory precision at the word level and in structured conversation; slightly improved since evaluation    - Pt with increased rate of speech resulting in reduced

## 2020-03-16 ENCOUNTER — FOLLOWUP TELEPHONE ENCOUNTER (OUTPATIENT)
Dept: INPATIENT UNIT | Age: 55
End: 2020-03-16

## 2020-06-09 ENCOUNTER — OFFICE VISIT (OUTPATIENT)
Dept: NEUROLOGY | Age: 55
End: 2020-06-09
Payer: COMMERCIAL

## 2020-06-09 VITALS
WEIGHT: 156 LBS | HEART RATE: 88 BPM | HEIGHT: 64 IN | SYSTOLIC BLOOD PRESSURE: 147 MMHG | DIASTOLIC BLOOD PRESSURE: 96 MMHG | BODY MASS INDEX: 26.63 KG/M2

## 2020-06-09 PROCEDURE — 99213 OFFICE O/P EST LOW 20 MIN: CPT | Performed by: PSYCHIATRY & NEUROLOGY

## 2020-06-09 PROCEDURE — 4004F PT TOBACCO SCREEN RCVD TLK: CPT | Performed by: PSYCHIATRY & NEUROLOGY

## 2020-06-09 PROCEDURE — G8419 CALC BMI OUT NRM PARAM NOF/U: HCPCS | Performed by: PSYCHIATRY & NEUROLOGY

## 2020-06-09 PROCEDURE — 3017F COLORECTAL CA SCREEN DOC REV: CPT | Performed by: PSYCHIATRY & NEUROLOGY

## 2020-06-09 PROCEDURE — G8427 DOCREV CUR MEDS BY ELIG CLIN: HCPCS | Performed by: PSYCHIATRY & NEUROLOGY

## 2020-11-03 PROBLEM — E78.5 HYPERLIPIDEMIA: Status: RESOLVED | Noted: 2020-11-03 | Resolved: 2020-11-03

## 2022-05-09 ENCOUNTER — APPOINTMENT (OUTPATIENT)
Dept: CT IMAGING | Age: 57
End: 2022-05-09
Payer: COMMERCIAL

## 2022-05-09 ENCOUNTER — APPOINTMENT (OUTPATIENT)
Dept: GENERAL RADIOLOGY | Age: 57
End: 2022-05-09
Payer: COMMERCIAL

## 2022-05-09 ENCOUNTER — HOSPITAL ENCOUNTER (EMERGENCY)
Age: 57
Discharge: ANOTHER HOSPITAL | End: 2022-05-10
Attending: EMERGENCY MEDICINE
Payer: COMMERCIAL

## 2022-05-09 DIAGNOSIS — I62.9 INTRACRANIAL HEMORRHAGE (HCC): ICD-10-CM

## 2022-05-09 DIAGNOSIS — R56.9 GENERALIZED SEIZURE (HCC): Primary | ICD-10-CM

## 2022-05-09 LAB
A/G RATIO: 1.4 (ref 1.1–2.2)
ALBUMIN SERPL-MCNC: 4.4 G/DL (ref 3.4–5)
ALP BLD-CCNC: 96 U/L (ref 40–129)
ALT SERPL-CCNC: 28 U/L (ref 10–40)
ANION GAP SERPL CALCULATED.3IONS-SCNC: 22 MMOL/L (ref 3–16)
APTT: 44.5 SEC (ref 26.2–38.6)
AST SERPL-CCNC: 52 U/L (ref 15–37)
BACTERIA: NORMAL /HPF
BASE EXCESS ARTERIAL: -3 MMOL/L (ref -3–3)
BASE EXCESS VENOUS: -19.1 MMOL/L (ref -3–3)
BASOPHILS ABSOLUTE: 0.1 K/UL (ref 0–0.2)
BASOPHILS RELATIVE PERCENT: 1 %
BILIRUB SERPL-MCNC: 0.6 MG/DL (ref 0–1)
BILIRUBIN URINE: ABNORMAL
BLOOD, URINE: NEGATIVE
BUN BLDV-MCNC: 12 MG/DL (ref 7–20)
CALCIUM SERPL-MCNC: 9.8 MG/DL (ref 8.3–10.6)
CARBOXYHEMOGLOBIN ARTERIAL: 7.8 % (ref 0–1.5)
CARBOXYHEMOGLOBIN: 10.7 % (ref 0–1.5)
CHLORIDE BLD-SCNC: 98 MMOL/L (ref 99–110)
CLARITY: CLEAR
CO2: 17 MMOL/L (ref 21–32)
COLOR: ABNORMAL
CREAT SERPL-MCNC: 0.9 MG/DL (ref 0.6–1.1)
EOSINOPHILS ABSOLUTE: 0.1 K/UL (ref 0–0.6)
EOSINOPHILS RELATIVE PERCENT: 0.9 %
EPITHELIAL CELLS, UA: 1 /HPF (ref 0–5)
ETHANOL: NORMAL MG/DL (ref 0–0.08)
GFR AFRICAN AMERICAN: >60
GFR NON-AFRICAN AMERICAN: >60
GLUCOSE BLD-MCNC: 115 MG/DL (ref 70–99)
GLUCOSE URINE: NEGATIVE MG/DL
HCO3 ARTERIAL: 22.2 MMOL/L (ref 21–29)
HCO3 VENOUS: 10.3 MMOL/L (ref 23–29)
HCT VFR BLD CALC: 52.3 % (ref 36–48)
HEMOGLOBIN, ART, EXTENDED: 16.1 G/DL (ref 12–16)
HEMOGLOBIN: 16.8 G/DL (ref 12–16)
HYALINE CASTS: 2 /LPF (ref 0–8)
INR BLD: 0.98 (ref 0.88–1.12)
KETONES, URINE: NEGATIVE MG/DL
LACTIC ACID, SEPSIS: 0.8 MMOL/L (ref 0.4–1.9)
LEUKOCYTE ESTERASE, URINE: ABNORMAL
LYMPHOCYTES ABSOLUTE: 3.4 K/UL (ref 1–5.1)
LYMPHOCYTES RELATIVE PERCENT: 40.7 %
MCH RBC QN AUTO: 35.4 PG (ref 26–34)
MCHC RBC AUTO-ENTMCNC: 32.2 G/DL (ref 31–36)
MCV RBC AUTO: 109.8 FL (ref 80–100)
METHEMOGLOBIN ARTERIAL: 0 %
METHEMOGLOBIN VENOUS: 0 %
MICROSCOPIC EXAMINATION: YES
MONOCYTES ABSOLUTE: 0.8 K/UL (ref 0–1.3)
MONOCYTES RELATIVE PERCENT: 9.2 %
NEUTROPHILS ABSOLUTE: 4 K/UL (ref 1.7–7.7)
NEUTROPHILS RELATIVE PERCENT: 48.2 %
NITRITE, URINE: NEGATIVE
O2 CONTENT, VEN: 22 VOL %
O2 SAT, ARTERIAL: 99.9 %
O2 SAT, VEN: 99 %
O2 THERAPY: ABNORMAL
O2 THERAPY: ABNORMAL
PCO2 ARTERIAL: 39.2 MMHG (ref 35–45)
PCO2, VEN: 35.5 MMHG (ref 40–50)
PDW BLD-RTO: 14.9 % (ref 12.4–15.4)
PH ARTERIAL: 7.36 (ref 7.35–7.45)
PH UA: 5.5 (ref 5–8)
PH VENOUS: 7.07 (ref 7.35–7.45)
PLATELET # BLD: 252 K/UL (ref 135–450)
PMV BLD AUTO: 8.8 FL (ref 5–10.5)
PO2 ARTERIAL: 141 MMHG (ref 75–108)
PO2, VEN: 173 MMHG (ref 25–40)
POTASSIUM REFLEX MAGNESIUM: 4.9 MMOL/L (ref 3.5–5.1)
PROTEIN UA: 100 MG/DL
PROTHROMBIN TIME: 11.1 SEC (ref 9.9–12.7)
RBC # BLD: 4.76 M/UL (ref 4–5.2)
RBC UA: 2 /HPF (ref 0–4)
REASON FOR REJECTION: NORMAL
REJECTED TEST: NORMAL
SODIUM BLD-SCNC: 137 MMOL/L (ref 136–145)
SPECIFIC GRAVITY UA: 1.02 (ref 1–1.03)
TCO2 ARTERIAL: 52.4 MMOL/L
TCO2 CALC VENOUS: 26 MMOL/L
TOTAL PROTEIN: 7.5 G/DL (ref 6.4–8.2)
TROPONIN: <0.01 NG/ML
URINE REFLEX TO CULTURE: ABNORMAL
URINE TYPE: ABNORMAL
UROBILINOGEN, URINE: 1 E.U./DL
WBC # BLD: 8.3 K/UL (ref 4–11)
WBC UA: 3 /HPF (ref 0–5)

## 2022-05-09 PROCEDURE — 70450 CT HEAD/BRAIN W/O DYE: CPT

## 2022-05-09 PROCEDURE — 83605 ASSAY OF LACTIC ACID: CPT

## 2022-05-09 PROCEDURE — 96365 THER/PROPH/DIAG IV INF INIT: CPT

## 2022-05-09 PROCEDURE — 93005 ELECTROCARDIOGRAM TRACING: CPT | Performed by: EMERGENCY MEDICINE

## 2022-05-09 PROCEDURE — 71045 X-RAY EXAM CHEST 1 VIEW: CPT

## 2022-05-09 PROCEDURE — 82077 ASSAY SPEC XCP UR&BREATH IA: CPT

## 2022-05-09 PROCEDURE — 36415 COLL VENOUS BLD VENIPUNCTURE: CPT

## 2022-05-09 PROCEDURE — 81001 URINALYSIS AUTO W/SCOPE: CPT

## 2022-05-09 PROCEDURE — 85610 PROTHROMBIN TIME: CPT

## 2022-05-09 PROCEDURE — 85730 THROMBOPLASTIN TIME PARTIAL: CPT

## 2022-05-09 PROCEDURE — 80053 COMPREHEN METABOLIC PANEL: CPT

## 2022-05-09 PROCEDURE — 84484 ASSAY OF TROPONIN QUANT: CPT

## 2022-05-09 PROCEDURE — 85025 COMPLETE CBC W/AUTO DIFF WBC: CPT

## 2022-05-09 PROCEDURE — 6360000002 HC RX W HCPCS

## 2022-05-09 PROCEDURE — 82803 BLOOD GASES ANY COMBINATION: CPT

## 2022-05-09 PROCEDURE — 99285 EMERGENCY DEPT VISIT HI MDM: CPT

## 2022-05-09 PROCEDURE — 96375 TX/PRO/DX INJ NEW DRUG ADDON: CPT

## 2022-05-09 PROCEDURE — 6360000002 HC RX W HCPCS: Performed by: EMERGENCY MEDICINE

## 2022-05-09 RX ORDER — DEXAMETHASONE SODIUM PHOSPHATE 10 MG/ML
10 INJECTION, SOLUTION INTRAMUSCULAR; INTRAVENOUS ONCE
Status: COMPLETED | OUTPATIENT
Start: 2022-05-09 | End: 2022-05-09

## 2022-05-09 RX ORDER — DICLOFENAC POTASSIUM 50 MG/1
TABLET, FILM COATED ORAL
Status: ON HOLD | COMMUNITY
Start: 2022-05-06 | End: 2022-05-10 | Stop reason: CLARIF

## 2022-05-09 RX ORDER — MIDAZOLAM HYDROCHLORIDE 2 MG/2ML
2 INJECTION, SOLUTION INTRAMUSCULAR; INTRAVENOUS ONCE
Status: DISCONTINUED | OUTPATIENT
Start: 2022-05-09 | End: 2022-05-10 | Stop reason: HOSPADM

## 2022-05-09 RX ORDER — LEVETIRACETAM 10 MG/ML
1000 INJECTION INTRAVASCULAR ONCE
Status: COMPLETED | OUTPATIENT
Start: 2022-05-09 | End: 2022-05-09

## 2022-05-09 RX ORDER — CYCLOBENZAPRINE HCL 10 MG
TABLET ORAL
COMMUNITY
Start: 2022-05-06

## 2022-05-09 RX ORDER — LORAZEPAM 2 MG/ML
INJECTION INTRAMUSCULAR
Status: COMPLETED
Start: 2022-05-09 | End: 2022-05-09

## 2022-05-09 RX ORDER — LEVETIRACETAM 500 MG/5ML
1000 INJECTION, SOLUTION, CONCENTRATE INTRAVENOUS ONCE
Status: DISCONTINUED | OUTPATIENT
Start: 2022-05-09 | End: 2022-05-09

## 2022-05-09 RX ADMIN — LEVETIRACETAM 1000 MG: 10 INJECTION INTRAVENOUS at 20:36

## 2022-05-09 RX ADMIN — LORAZEPAM: 2 INJECTION, SOLUTION INTRAMUSCULAR; INTRAVENOUS at 18:30

## 2022-05-09 RX ADMIN — DEXAMETHASONE SODIUM PHOSPHATE 10 MG: 10 INJECTION INTRAMUSCULAR; INTRAVENOUS at 20:36

## 2022-05-09 NOTE — ED NOTES
Pt brought in by ems, found down at home. Last known normal 1430 today. Pt works from home. Found naked in room, seizure witnessed by ems. Pt appears to have bitten her tongue. Pt moving all ext, not making eye contact, opens eyes to stimuli. Uncooperative. Ct scan done. Urinary catheter placed. Pt pulling at lines and tubes. Soft restraints placed. Dr Bernardino Birmingham at bedside. Updated on pts condition. Iv in left forearm infiltrated. Attempting to place additional line. Oncoming nurse at bedside handoff given. Removed nasal trumpet from rt nare that was placed by ems. Dr Bernardino Birmingham aware of critical lab findings. abg's obtained from left radial artery.        Trudy Bhat RN  05/09/22 1914

## 2022-05-10 ENCOUNTER — HOSPITAL ENCOUNTER (INPATIENT)
Age: 57
LOS: 2 days | Discharge: HOME | DRG: 166 | End: 2022-05-12
Attending: INTERNAL MEDICINE | Admitting: INTERNAL MEDICINE
Payer: COMMERCIAL

## 2022-05-10 ENCOUNTER — APPOINTMENT (OUTPATIENT)
Dept: CT IMAGING | Age: 57
DRG: 166 | End: 2022-05-10
Attending: INTERNAL MEDICINE
Payer: COMMERCIAL

## 2022-05-10 ENCOUNTER — APPOINTMENT (OUTPATIENT)
Dept: MRI IMAGING | Age: 57
DRG: 166 | End: 2022-05-10
Attending: INTERNAL MEDICINE
Payer: COMMERCIAL

## 2022-05-10 VITALS
SYSTOLIC BLOOD PRESSURE: 141 MMHG | OXYGEN SATURATION: 97 % | WEIGHT: 156 LBS | BODY MASS INDEX: 26.78 KG/M2 | TEMPERATURE: 98.8 F | RESPIRATION RATE: 20 BRPM | DIASTOLIC BLOOD PRESSURE: 93 MMHG | HEART RATE: 70 BPM

## 2022-05-10 DIAGNOSIS — R59.0 MEDIASTINAL LYMPHADENOPATHY: ICD-10-CM

## 2022-05-10 PROBLEM — I61.9 INTRAPARENCHYMAL HEMORRHAGE OF BRAIN (HCC): Status: ACTIVE | Noted: 2022-05-10

## 2022-05-10 LAB
AMPHETAMINE SCREEN, URINE: NORMAL
BARBITURATE SCREEN URINE: NORMAL
BENZODIAZEPINE SCREEN, URINE: NORMAL
CANNABINOID SCREEN URINE: NORMAL
COCAINE METABOLITE SCREEN URINE: NORMAL
EKG ATRIAL RATE: 97 BPM
EKG DIAGNOSIS: NORMAL
EKG P AXIS: 65 DEGREES
EKG P-R INTERVAL: 190 MS
EKG Q-T INTERVAL: 380 MS
EKG QRS DURATION: 84 MS
EKG QTC CALCULATION (BAZETT): 482 MS
EKG R AXIS: 89 DEGREES
EKG T AXIS: 72 DEGREES
EKG VENTRICULAR RATE: 97 BPM
GLUCOSE BLD-MCNC: 104 MG/DL (ref 70–99)
GLUCOSE BLD-MCNC: 136 MG/DL (ref 70–99)
Lab: NORMAL
METHADONE SCREEN, URINE: NORMAL
OPIATE SCREEN URINE: NORMAL
OXYCODONE URINE: NORMAL
PERFORMED ON: ABNORMAL
PERFORMED ON: ABNORMAL
PH UA: 7
PHENCYCLIDINE SCREEN URINE: NORMAL
PROPOXYPHENE SCREEN: NORMAL

## 2022-05-10 PROCEDURE — APPNB45 APP NON BILLABLE 31-45 MINUTES: Performed by: NURSE PRACTITIONER

## 2022-05-10 PROCEDURE — 97535 SELF CARE MNGMENT TRAINING: CPT

## 2022-05-10 PROCEDURE — 97166 OT EVAL MOD COMPLEX 45 MIN: CPT

## 2022-05-10 PROCEDURE — 70553 MRI BRAIN STEM W/O & W/DYE: CPT

## 2022-05-10 PROCEDURE — 94761 N-INVAS EAR/PLS OXIMETRY MLT: CPT

## 2022-05-10 PROCEDURE — C9113 INJ PANTOPRAZOLE SODIUM, VIA: HCPCS | Performed by: NURSE PRACTITIONER

## 2022-05-10 PROCEDURE — 71260 CT THORAX DX C+: CPT

## 2022-05-10 PROCEDURE — 6370000000 HC RX 637 (ALT 250 FOR IP): Performed by: NURSE PRACTITIONER

## 2022-05-10 PROCEDURE — 2000000000 HC ICU R&B

## 2022-05-10 PROCEDURE — 99223 1ST HOSP IP/OBS HIGH 75: CPT | Performed by: INTERNAL MEDICINE

## 2022-05-10 PROCEDURE — 6360000002 HC RX W HCPCS: Performed by: PHYSICIAN ASSISTANT

## 2022-05-10 PROCEDURE — 2500000003 HC RX 250 WO HCPCS: Performed by: STUDENT IN AN ORGANIZED HEALTH CARE EDUCATION/TRAINING PROGRAM

## 2022-05-10 PROCEDURE — 6360000002 HC RX W HCPCS: Performed by: NURSE PRACTITIONER

## 2022-05-10 PROCEDURE — 6370000000 HC RX 637 (ALT 250 FOR IP): Performed by: STUDENT IN AN ORGANIZED HEALTH CARE EDUCATION/TRAINING PROGRAM

## 2022-05-10 PROCEDURE — 6360000002 HC RX W HCPCS: Performed by: STUDENT IN AN ORGANIZED HEALTH CARE EDUCATION/TRAINING PROGRAM

## 2022-05-10 PROCEDURE — 80307 DRUG TEST PRSMV CHEM ANLYZR: CPT

## 2022-05-10 PROCEDURE — 93010 ELECTROCARDIOGRAM REPORT: CPT | Performed by: INTERNAL MEDICINE

## 2022-05-10 PROCEDURE — 70450 CT HEAD/BRAIN W/O DYE: CPT

## 2022-05-10 PROCEDURE — 2580000003 HC RX 258: Performed by: NURSE PRACTITIONER

## 2022-05-10 PROCEDURE — 97530 THERAPEUTIC ACTIVITIES: CPT

## 2022-05-10 PROCEDURE — 97163 PT EVAL HIGH COMPLEX 45 MIN: CPT

## 2022-05-10 PROCEDURE — 2580000003 HC RX 258: Performed by: STUDENT IN AN ORGANIZED HEALTH CARE EDUCATION/TRAINING PROGRAM

## 2022-05-10 PROCEDURE — 97116 GAIT TRAINING THERAPY: CPT

## 2022-05-10 PROCEDURE — 92610 EVALUATE SWALLOWING FUNCTION: CPT

## 2022-05-10 PROCEDURE — 6360000004 HC RX CONTRAST MEDICATION: Performed by: STUDENT IN AN ORGANIZED HEALTH CARE EDUCATION/TRAINING PROGRAM

## 2022-05-10 RX ORDER — ACETAMINOPHEN 325 MG/1
650 TABLET ORAL EVERY 4 HOURS PRN
Status: DISCONTINUED | OUTPATIENT
Start: 2022-05-10 | End: 2022-05-12 | Stop reason: HOSPADM

## 2022-05-10 RX ORDER — LORAZEPAM 2 MG/ML
0.5 INJECTION INTRAMUSCULAR
Status: COMPLETED | OUTPATIENT
Start: 2022-05-10 | End: 2022-05-10

## 2022-05-10 RX ORDER — DEXTROSE MONOHYDRATE 50 MG/ML
100 INJECTION, SOLUTION INTRAVENOUS PRN
Status: DISCONTINUED | OUTPATIENT
Start: 2022-05-10 | End: 2022-05-12 | Stop reason: HOSPADM

## 2022-05-10 RX ORDER — IBUPROFEN 200 MG
400 TABLET ORAL EVERY 6 HOURS PRN
Status: ON HOLD | COMMUNITY
End: 2022-05-12 | Stop reason: HOSPADM

## 2022-05-10 RX ORDER — LABETALOL HYDROCHLORIDE 5 MG/ML
10 INJECTION, SOLUTION INTRAVENOUS EVERY 4 HOURS PRN
Status: DISCONTINUED | OUTPATIENT
Start: 2022-05-10 | End: 2022-05-12 | Stop reason: HOSPADM

## 2022-05-10 RX ORDER — PANTOPRAZOLE SODIUM 40 MG/10ML
40 INJECTION, POWDER, LYOPHILIZED, FOR SOLUTION INTRAVENOUS DAILY
Status: DISCONTINUED | OUTPATIENT
Start: 2022-05-10 | End: 2022-05-12 | Stop reason: HOSPADM

## 2022-05-10 RX ORDER — DEXAMETHASONE 4 MG/1
4 TABLET ORAL EVERY 6 HOURS SCHEDULED
Status: DISCONTINUED | OUTPATIENT
Start: 2022-05-10 | End: 2022-05-12

## 2022-05-10 RX ORDER — ONDANSETRON 4 MG/1
4 TABLET, ORALLY DISINTEGRATING ORAL EVERY 8 HOURS PRN
Status: DISCONTINUED | OUTPATIENT
Start: 2022-05-10 | End: 2022-05-12 | Stop reason: HOSPADM

## 2022-05-10 RX ORDER — ATENOLOL 50 MG/1
100 TABLET ORAL DAILY
Status: DISCONTINUED | OUTPATIENT
Start: 2022-05-10 | End: 2022-05-12 | Stop reason: HOSPADM

## 2022-05-10 RX ORDER — ATORVASTATIN CALCIUM 40 MG/1
40 TABLET, FILM COATED ORAL DAILY
Status: DISCONTINUED | OUTPATIENT
Start: 2022-05-10 | End: 2022-05-12 | Stop reason: HOSPADM

## 2022-05-10 RX ORDER — ONDANSETRON 2 MG/ML
4 INJECTION INTRAMUSCULAR; INTRAVENOUS EVERY 6 HOURS PRN
Status: DISCONTINUED | OUTPATIENT
Start: 2022-05-10 | End: 2022-05-12 | Stop reason: HOSPADM

## 2022-05-10 RX ADMIN — DEXAMETHASONE 4 MG: 4 TABLET ORAL at 12:05

## 2022-05-10 RX ADMIN — LEVETIRACETAM 1000 MG: 100 INJECTION, SOLUTION, CONCENTRATE INTRAVENOUS at 20:09

## 2022-05-10 RX ADMIN — IOPAMIDOL 80 ML: 755 INJECTION, SOLUTION INTRAVENOUS at 13:07

## 2022-05-10 RX ADMIN — LEVETIRACETAM 500 MG: 100 INJECTION, SOLUTION, CONCENTRATE INTRAVENOUS at 07:30

## 2022-05-10 RX ADMIN — ATENOLOL 100 MG: 50 TABLET ORAL at 10:45

## 2022-05-10 RX ADMIN — PANTOPRAZOLE SODIUM 40 MG: 40 INJECTION, POWDER, FOR SOLUTION INTRAVENOUS at 09:10

## 2022-05-10 RX ADMIN — DEXAMETHASONE 4 MG: 4 TABLET ORAL at 05:00

## 2022-05-10 RX ADMIN — DEXAMETHASONE 4 MG: 4 TABLET ORAL at 23:15

## 2022-05-10 RX ADMIN — ATORVASTATIN CALCIUM 40 MG: 40 TABLET, FILM COATED ORAL at 10:46

## 2022-05-10 RX ADMIN — LABETALOL HYDROCHLORIDE 10 MG: 5 INJECTION, SOLUTION INTRAVENOUS at 04:58

## 2022-05-10 RX ADMIN — LORAZEPAM 0.5 MG: 2 INJECTION INTRAMUSCULAR; INTRAVENOUS at 22:51

## 2022-05-10 RX ADMIN — DEXAMETHASONE 4 MG: 4 TABLET ORAL at 18:50

## 2022-05-10 ASSESSMENT — ENCOUNTER SYMPTOMS
VOMITING: 0
SHORTNESS OF BREATH: 0
COUGH: 0
CONSTIPATION: 0
DIARRHEA: 0
TROUBLE SWALLOWING: 0
ABDOMINAL PAIN: 0
ABDOMINAL DISTENTION: 0
NAUSEA: 0

## 2022-05-10 ASSESSMENT — PAIN SCALES - GENERAL: PAINLEVEL_OUTOF10: 0

## 2022-05-10 NOTE — ED NOTES
Pt report called to Stew Amaya RN at [facility] at this time. She states no further questions or concerns.      Rocio Jordan RN  05/10/22 2415

## 2022-05-10 NOTE — PROGRESS NOTES
Pt admitted to room 4505 from [facility]. Pt alert and oriented with delayed responses. Pt restless. VSS. Admission assessment complete. Notified residents of Pts arrival. Awaiting orders. Oriented Pt to room. Fall precautions in place. Will continue to monitor.

## 2022-05-10 NOTE — H&P
ICU HISTORY AND 2025 [facility] Day: 0  ICU Day: 0                                                         Code:Full Code  Admit Date: 5/10/2022  PCP: Joanne Vazquez MD                                  CC: seizure    HISTORY OF PRESENT ILLNESS:     56F with PMHx HTN/HLD presenting with witnessed seizure activity. Last known normal was 1430 yesterday. She was found down, naked in room, and appeared to have bitten tongue per chart review. Noted to have pH 7.071 and CO2 25 on VBG. Repeat ABG with PH 7.36 CO2 39. She was noted to have intraparenchymal hemorrhage with brain mass on CT. She was loaded with decadron and keppra and transferred to [facility]    On examination, patient is AOx3. She does not recall having a seizure earlier today. She is slow to respond but answering questions appropriately. She does not recall biting her tongue. She denies fever, chills, chest pain, shortness of breath, nausea, vomiting, constipation, diarrhea. She denies family history of cancer. PAST HISTORY:     Past Medical History:   Diagnosis Date    Hyperlipidemia     Hypertension     Vitamin D deficiency        Past Surgical History:   Procedure Laterality Date    ANKLE SURGERY Left     COLONOSCOPY         SocialHistory:   The patient lives at home with      Alcohol: 8 drinks/week  Illicit drugs: no use  Tobacco: 20 pack year smoking history    Family History:  Family History   Problem Relation Age of Onset    Stroke Mother     Cancer Father        MEDICATIONS:     No current facility-administered medications on file prior to encounter.      Current Outpatient Medications on File Prior to Encounter   Medication Sig Dispense Refill    cyclobenzaprine (FLEXERIL) 10 MG tablet TAKE 1 TABLET BY MOUTH THREE TIMES DAILY      diclofenac (CATAFLAM) 50 MG tablet TAKE 1 TABLET BY MOUTH TWICE DAILY      vitamin B-1 (THIAMINE) 100 MG tablet Take 1 tablet by mouth daily for 7 days 7 tablet 0    atenolol (TENORMIN) 100 MG tablet TK 1 T PO D      Hydroquinone 4 % EMUL bid (Patient not taking: Reported on 6/9/2020) 1 Bottle 2    atorvastatin (LIPITOR) 40 MG tablet TAKE 1 TABLET EVERY DAY 90 tablet 0    ergocalciferol (DRISDOL) 13642 UNITS capsule Take 1 capsule by mouth once a week. 4 capsule 2         Scheduled Meds:   atorvastatin  40 mg Oral Daily    levetiracetam  500 mg IntraVENous Q12H    dexamethasone  4 mg Oral 4 times per day      Continuous Infusions:   dextrose       PRN Meds:acetaminophen, ondansetron **OR** ondansetron, glucose, dextrose bolus **OR** dextrose bolus, glucagon (rDNA), dextrose    Allergies: No Known Allergies    REVIEW OF SYSTEMS:       History obtained from chart review and the patient    Review of Systems   Constitutional: Negative for chills and fever. Respiratory: Negative for cough and shortness of breath. Cardiovascular: Negative for chest pain, palpitations and leg swelling. Gastrointestinal: Negative for abdominal distention, abdominal pain, constipation, diarrhea, nausea and vomiting. Genitourinary: Negative for dysuria. Neurological: Negative for weakness and headaches. PHYSICAL EXAM:       Vitals: BP (!) 151/81   Pulse 69   Temp 98 °F (36.7 °C) (Oral)   Resp 19   Ht 5' 5\" (1.651 m)   Wt 139 lb 15.9 oz (63.5 kg)   SpO2 (!) 89%   BMI 23.30 kg/m²     I/O:  No intake or output data in the 24 hours ending 05/10/22 0403  No intake/output data recorded. No intake/output data recorded. Physical Examination:     Physical Exam  HENT:      Head: Normocephalic and atraumatic. Nose: Nose normal.      Mouth/Throat:      Mouth: Mucous membranes are moist.      Pharynx: Oropharynx is clear. Eyes:      Extraocular Movements: Extraocular movements intact. Pupils: Pupils are equal, round, and reactive to light. Cardiovascular:      Rate and Rhythm: Normal rate and regular rhythm. Pulses: Normal pulses. Heart sounds: Normal heart sounds.  No murmur heard.      Pulmonary:      Effort: Pulmonary effort is normal. No respiratory distress. Breath sounds: Normal breath sounds. No wheezing. Abdominal:      General: Abdomen is flat. Bowel sounds are normal. There is no distension. Palpations: Abdomen is soft. Tenderness: There is no abdominal tenderness. Musculoskeletal:      Right lower leg: No edema. Left lower leg: No edema. Neurological:      General: No focal deficit present. Mental Status: She is oriented to person, place, and time. Access:   -Central Access Day #:  0                                   -Peripheral Access Day#:1  -Arterial line Day#: 0                                   Dominguez Day#: 1  NGT Day#:  0                                             ETT Day#: 0  Vent Settings:    / / /     Recent Labs     05/09/22  1848   PHART 7.361   FDX4ZXU 39.2   PO2ART 141.0*           DATA:       Labs:  CBC:   Recent Labs     05/09/22 1815   WBC 8.3   HGB 16.8*   HCT 52.3*          BMP:   Recent Labs     05/09/22 1910      K 4.9   CL 98*   CO2 17*   BUN 12   CREATININE 0.9   GLUCOSE 115*     LFT's:   Recent Labs     05/09/22 1910   AST 52*   ALT 28   BILITOT 0.6   ALKPHOS 96     Troponin:   Recent Labs     05/09/22 1910   TROPONINI <0.01     BNP:No results for input(s): BNP in the last 72 hours. ABGs:   Recent Labs     05/09/22  1848   PHART 7.361   [id number] 39.2   PO2ART 141.0*     INR:   Recent Labs     05/09/22 1815   INR 0.98       U/A:  Recent Labs     05/09/22 1848   COLORU DARK YELLOW*   PHUR 5.5   WBCUA 3   RBCUA 2   BACTERIA None Seen   CLARITYU Clear   SPECGRAV 1.025   LEUKOCYTESUR TRACE*   UROBILINOGEN 1.0   BILIRUBINUR SMALL*   BLOODU Negative   GLUCOSEU Negative       CT HEAD WO CONTRAST    (Results Pending)   CT head without contrast    (Results Pending)   MRI BRAIN W WO CONTRAST    (Results Pending)       EKG: NSR  Echo: n/a  Micro: n/a    ASSESSMENT AND PLAN:   Shannon Jones is a 60 y.o. female, who was admitted for seizure and noted to have CT head with 3 cm hyperdense mass in left frontal lobe and 2.5 cm acute intraparenchymal hemorrhage    Intraparenchymal hemorrhage  CT head - 2.4 cm intraparenchymal hemorrhage in left temporal lobe associated with mild edema  -repeat CT head  -MRI brain with and without cnotrast  -keep BP < 160 mmHg  -s/p keppra and decadron load  -decadron 4 mg PO q6hrs  -keppra 500 mg BID  -SLP/PT/OT  -telemetry monitoring  -neuro checks  -neurology consult    Left frontal lobe mass  CT head - 3 cm hyperdense mass in left frontal lobe associated with vasogenic edema suggestive of neoplastic/metastatic process  -CT chest/abd/pelvis evaluate for metastasis    Seizure  Likely secondary to brain mass/hemorrhage as described above.  -keppra 500 mg BID  -seizure precautions  -UDS    Hyperlipidemia  -continue statin        Code Status:Full Code  FEN: NPO  PPX:  SCD  DISPO: ICU     This patient has been staffed and discussed with Ariel Cordero DO.   -----------------------------  Jonathan Perez MD, PGY-1  5/10/2022  4:03 AM    I saw the patient independently from the resident . I discussed the care with the resident. I personally reviewed the HPI, PH, FH, SH, ROS and medications. I repeated pertinent portions of the examination and reviewed the relevant imaging and laboratory data. I agree with the findings, assessment and plan as documented. addition to: Patient is a 40-year-old female admitted for acute intraparenchymal hemorrhage requiring critical ICU care with nightly neurochecks and neurosurgical evaluation.   CCT: 31 minutes

## 2022-05-10 NOTE — PROGRESS NOTES
Late entry: 1124      Speech Language Pathology  Facility/Department: [facility] ICU   CLINICAL BEDSIDE SWALLOW EVALUATION    NAME: Lila Cook  : 1965  MRN: [id number]    ADMISSION DATE: 5/10/2022  ADMITTING DIAGNOSIS: has HTN (hypertension), benign; Vitamin D deficiency; Vertigo; Dyslipidemia; and Intraparenchymal hemorrhage of brain ([address] on their problem list.  ONSET DATE: 05/10/2022    Recent Chest Xray: (2022)  Impression   Increased perihilar markings.  Atelectasis, infectious or inflammatory airway   process, and interstitial edema are in the differential.       Nonspecific left basilar opacity.  Correlate with presentation. Recent CT Head: (2022)  Impression   A 3 cm hyperdense mass in the left frontal lobe associated with vasogenic   edema, suggestive of neoplastic/metastatic process.       Additionally, there is a 2.5 cm acute intraparenchymal hemorrhage in the left   temporal lobe associated with mild edema. Date of Eval: 5/10/2022  Evaluating Therapist: ROMERO Kelly    Current Diet level:  Current Diet : Regular      Primary Complaint  Patient Complaint: Did not state. Pain:  Pain Assessment  Pain Assessment: None - Denies Pain    Reason for Referral  Lila Cook was referred for a bedside swallow evaluation to assess the efficiency of her swallow function, identify signs and symptoms of aspiration and make recommendations regarding safe dietary consistencies, effective compensatory strategies, and safe eating environment. Impression  Dysphagia Diagnosis: No clinical indicators of dysphagia;Swallow function appears WFL  Dysphagia Impression : Swallow function appears grossly WFL for PO trials assessed (thins via cup, 3 oz sequential, regular solid), with patient demonstrating positive oral acceptance, timely mastication, positive swallow movement, good oral clearance, no overt signs of aspiration or penetration, with patient passing 3 oz screen. Recommend continue regular texture diet and thin liquids, as tolerated. Dysphagia Outcome Severity Scale: Level 7: Normal in all situations     Treatment Plan  Requires SLP Intervention: Yes (No dysphagia; recommend consider cogntive-linguistic assessment given imaging results (MRI pending))  Duration of Treatment: NA  D/C Recommendations: To be determined       Recommended Diet and Intervention  Regular solids and thin liquids  Recommended Form of Meds: PO          Compensatory Swallowing Strategies  Compensatory Swallowing Strategies : Upright as possible for all oral intake    Treatment/Goals  Short-term Goals  Timeframe for Short-term Goals: NA  Long-term Goals  Timeframe for Long-term Goals: NA    General  Chart Reviewed: Yes  Comments: Per admitting H&P (05/10/2022): '56F with PMHx HTN/HLD presenting with witnessed seizure activity. Last known normal was 1430 yesterday. She was found down, naked in room, and appeared to have bitten tongue per chart review. Noted to have pH 7.071 and CO2 25 on VBG. Repeat ABG with PH 7.36 CO2 39. She was noted to have intraparenchymal hemorrhage with brain mass on CT. She was loaded with decadron and keppra and transferred to [facility].. On examination, patient is AOx3. She does not recall having a seizure earlier today. She is slow to respond but answering questions appropriately. She does not recall biting her tongue. She denies fever, chills, chest pain, shortness of breath, nausea, vomiting, constipation, diarrhea. She denies family history of cancer. '  Subjective  Subjective: Patient awake, alert, pleasant, seen seated up in bed, on 1L O2 via nc.  present. Behavior/Cognition: Alert; Cooperative;Pleasant mood  Respiratory Status: O2 via nasual cannula  O2 Device: Nasal cannula  Liters of Oxygen: 1 L  Communication Observation: Functional  Follows Directions: Simple  Dentition: Adequate  Patient Positioning: Upright in bed  Baseline Vocal Quality: Normal  Volitional Cough: Strong  Prior Dysphagia History: Patient previously seen in March 2020 for bedside swallow evaluation; recommendations at that time were for regular solids and thin liquids. Vision/Hearing  Vision  Vision: Within Functional Limits  Hearing  Hearing: Within functional limits    Oral Motor Deficits  Oral/Motor  Oral Hygiene: Moist;Clean    Prognosis  Individuals consulted  Consulted and agree with results and recommendations: Patient;RN  RN Name: Tiago Villalba 134    Education  Patient Education: Educated pt to purpose of visit, swallow function, recommendations, strategies, concerns. Patient Education Response: Verbalizes understanding  Safety Devices in place: Yes  Type of devices: All fall risk precautions in place;Call light within reach; Left in bed       Therapy Time  SLP Individual Minutes  Time In: 1104  Time Out: [address]  Minutes: 19     SLP Total Treatment Time  Timed Code Treatment Minutes: 0 Minutes  Total Treatment Time: 19     Plan  Diet Recommendations: regular texture solids, thin liquids, meds PO    Discharge Plan:  TBD  Discussed with RNJv. Needs within reach. Electronically Signed by:  STACI Antoine [address]   Speech-Language Pathologist  Pager #[phone]    This document will serve as a discharge summary if pt discharges before next treatment.

## 2022-05-10 NOTE — PROGRESS NOTES
Hospitalist Progress Note      PCP: Debo Nguyen MD    Date of Admission: 5/10/2022    CC seizure. Hospital course  Patient is 64-year-old female with a history of hypertension, hyperlipidemia, active tobacco abuse presented to [facility] ER for syncope. Patient reports that last thing she remembers rewarming her dinner.  at bedside reports that yesterday around 2:30 PM he went outside patient was doing fine. He came home took a shower and almost 530 to 6 PM he noticed that patient was found down naked in the room appeared to have bitten her tongue. Was also frothing from mouth. CT head revealed intraparenchymal hemorrhage with apparent mass-effect. Patient did receive Keppra and Decadron transferred to [facility]    On arrival to [facility]. patient is alert oriented x3. Subjective  Patient seen and examined today. Denies any headache, nausea, vomiting, fever, chills. Alert oriented x3, no focal neurodeficit noted. Patient reports that she drinks wine sometimes. .    Assessment and plan  #Loss of consciousness  Unable to determine if seizure provoked loss of consciousness and fall or if there is other etiology for a loss of consciousness. Will get an echocardiogram.  Check TSH. #intraparenchymal hemorrhage with seizures. Repeat CT head showed stabilization of hemorrhage. Follow-up on MRI brain with and without contrast  Continue Decadron, Keppra. SBP below 160. Neurosurgery, neurology on board. Left frontal hypodensity. Follow-up on CT abdomen, pelvis and chest to rule out any neoplasm. #Hyperlipidemia  Continue statin. #Active tobacco abuse  Counseled regarding cessation.           Medications:  Reviewed    Infusion Medications    dextrose       Scheduled Medications    atorvastatin  40 mg Oral Daily    dexamethasone  4 mg Oral 4 times per day    pantoprazole  40 mg IntraVENous Daily    levetiracetam  1,000 mg IntraVENous Q12H    atenolol  100 mg Oral Daily     PRN Meds: acetaminophen, ondansetron **OR** ondansetron, glucose, dextrose bolus **OR** dextrose bolus, glucagon (rDNA), dextrose, labetalol, perflutren lipid microspheres      Intake/Output Summary (Last 24 hours) at 5/10/2022 1346  Last data filed at 5/10/2022 1000  Gross per 24 hour   Intake    Output 500 ml   Net -500 ml       Physical Exam Performed:    BP (!) 154/77   Pulse 72   Temp 97.8 °F (36.6 °C) (Oral)   Resp 24   Ht 5' 5\" (1.651 m)   Wt 139 lb 15.9 oz (63.5 kg)   SpO2 93%   BMI 23.30 kg/m²     General appearance: No apparent distress, appears stated age and cooperative. HEENT: Pupils equal, round, and reactive to light. Conjunctivae/corneas clear. Neck: Supple, with full range of motion. No jugular venous distention. Trachea midline. Respiratory:  Normal respiratory effort. Clear to auscultation, bilaterally without Rales/Wheezes/Rhonchi. Cardiovascular: Regular rate and rhythm with normal S1/S2 without murmurs, rubs or gallops. Abdomen: Soft, non-tender, non-distended with normal bowel sounds. Musculoskeletal: No clubbing, cyanosis or edema bilaterally. Full range of motion without deformity. Skin: Skin color, texture, turgor normal.  No rashes or lesions. Neurologic:  Neurovascularly intact without any focal sensory/motor deficits.  Cranial nerves: II-XII intact, grossly non-focal.  Psychiatric: Alert and oriented, thought content appropriate, normal insight  Capillary Refill: Brisk,< 3 seconds   Peripheral Pulses: +2 palpable, equal bilaterally       Labs:   Recent Labs     05/09/22 1815   WBC 8.3   HGB 16.8*   HCT 52.3*        Recent Labs     05/09/22 1910      K 4.9   CL 98*   CO2 17*   BUN 12   CREATININE 0.9   CALCIUM 9.8     Recent Labs     05/09/22 1910   AST 52*   ALT 28   BILITOT 0.6   ALKPHOS 96     Recent Labs     05/09/22 1815   INR 0.98     Recent Labs     05/09/22 1910   TROPONINI <0.01       Urinalysis:      Lab Results   Component Value Date NITRU Negative 05/09/2022    WBCUA 3 05/09/2022    BACTERIA None Seen 05/09/2022    RBCUA 2 05/09/2022    BLOODU Negative 05/09/2022    SPECGRAV 1.025 05/09/2022    GLUCOSEU Negative 05/09/2022    GLUCOSEU NEGATIVE 03/21/2010       Radiology:  CT HEAD WO CONTRAST   Final Result   1. Stable appearance of intra-axial left frontal lobe and left temporal lobe hemorrhage with vasogenic edema. 2. Patient status post seizure which may account for hemorrhage, posttraumatic, however, Magnetic resonance imaging without and with contrast is suggested to exclude underlying occult pathology. MRI BRAIN W WO CONTRAST    (Results Pending)   CT CHEST ABDOMEN PELVIS W CONTRAST    (Results Pending)           Assessment/Plan:    Active Hospital Problems    Diagnosis Date Noted    Intraparenchymal hemorrhage of brain ([facility] [address] [I61.9] 05/10/2022     Priority: Medium         DVT Prophylaxis: SCD  Diet: ADULT DIET; Regular; 4 carb choices (60 gm/meal)  Code Status: Full Code    PT/OT Eval Status: once more stable. Dispo - inpatient.  2-3 days    Maria De Jesus Pierce MD     This chart was likely completed using voice recognition technology and may contain unintended grammatical , phraseology,and/or punctuation errors

## 2022-05-10 NOTE — PROGRESS NOTES
Clinical Pharmacy Progress Note  Medication History     Admit Date: 5/10/2022    List of of current medications patient is taking is complete. Home Medication list in EPIC updated to reflect changes noted below. Source of information: Patient Interview    [id number] ([phone]), [facility]     Changes made to medication list:   Medications removed:    Diclofenac   Ergocalciferol   Hydroquinone   Thiamine  Medications added:    Ibuprofen  Other notes:    Medication history now complete per patient interview    Current Outpatient Medications on File Prior to Encounter   Medication Sig Dispense Refill    ibuprofen (ADVIL;MOTRIN) 200 MG tablet Take 400 mg by mouth every 6 hours as needed for Pain      cyclobenzaprine (FLEXERIL) 10 MG tablet TAKE 1 TABLET BY MOUTH THREE TIMES DAILY      atenolol (TENORMIN) 100 MG tablet TK 1 T PO D      atorvastatin (LIPITOR) 40 MG tablet TAKE 1 TABLET EVERY DAY 90 tablet 0       Please call with any questions.   Taniya Soni, PharmD  PGY-1 Pharmacy Resident  [id number]  5/10/2022 1:59 PM

## 2022-05-10 NOTE — PROGRESS NOTES
Stroke Admission    I agree as the admission nurse that I have completed a thorough stroke assessment and completed the admission on the patient. ALL assessment areas listed below have been addressed and completed. Presentation: Hemorrhagic    Handoff assessment completed with [facility]. Current NIHSS 1.     [x]   Education Assessment  [x]   Individualized Stroke/TIA Education template added, including patient specific risk factors: Hypertension, High Cholesterol and Smoking  [x]   Individualized Stroke/TIA Care Plan template added  [x]   Bedside swallow screen completed using the [place] Swallow Protocol, and documented PRIOR to any PO meds, food or drink: Pass  [x]   VTE Prophylaxis: SCDs ordered/addressed; SCDs: On           (As a reminder, ASA, Plavix and TPA are not VTE prophylaxis.)  [x]   Stroke education booklet given, and education initiated with patient and/or caregiver      Nurse eSignature: Electronically signed by Nelson Tamayo RN on 5/10/22 at 5:52 AM EDT

## 2022-05-10 NOTE — ED NOTES
PT report given to [facility] provider at this time. She states no further questions or concerns.      Rosalba Macedo RN  05/10/22 [id number]

## 2022-05-10 NOTE — PROGRESS NOTES
Clinical Pharmacy Consult Note    60 y.o. female admitted with intraparenchymal hemorrhage. Pharmacy has been asked by Dr. Breann Jaffe to adjust all drips to normal saline as appropriate based on compatibility to avoid fluid shifts since D5 is osmotically active. The following intermittent IV drips/infusions have been adjusted to saline:  Keppra    The following medications must remain in D5W due to incompatibility with normal saline:  Amphotericin  Mycophenolate  Nitroprusside  Penicillin G Potassium    Please be aware that patient has D5W ordered as part of hypoglycemia orderset. [facility] will follow daily to ensure all new IVPBs + drips are in NS. Please call with questions.   Alla Ramirez, PharmD 5/10/2022, 5:19 AM

## 2022-05-10 NOTE — ED NOTES
PT alert and oriented at this time, no longer pulling at lines and dressings. Soft, bilateral wrist restraints removed at this time. Circulation intact, ROM performed, no S/S of injury noted. Family at bedside.      Ciera Landrum RN  05/09/22 7650

## 2022-05-10 NOTE — PROGRESS NOTES
Resident at bedside. Clarified which scans for Pt to get at this time. Wants CT of head w/o contrast now, then MRI later in day. Called down to CT, tech not present. Gave call back number. Waiting for call back. Pts /82. Notified Dr. Bandar Quintero. See new orders.

## 2022-05-10 NOTE — CONSULTS
ICU HISTORY AND PHYSICAL       Hospital Day:   ICU Day:                                                          Code:Full Code  Admit Date: 5/10/2022  PCP: Boris Lane MD                                  CC: Seizure    HISTORY OF PRESENT ILLNESS:   DB, 61 yo F with PMHx of HTN and HLD presenting with seizure activity. She was at her baseline normal yesterday (5/9) at 14:30. She was then found on the ground by her , unresponsive. He called EMS who then noted seizure activity and a bitten tongue. She was transferred to the ED at [facility] where she was loaded with Decadron and Keppra. CT Head showed intraparenchymal hemorrhage in LT temporal lobe and LT brain hyperdensity in parietal lobe. She was then transferred to [facility]    Pt endorses no previous hx of seizures and does not remember the event or being transferred to the hospital. She was unsure if there was any loss of urine or bowel function during event. She states that she had and MRI 2 years ago but cannot remember why but states everything was normal. She has been to a specialized clinic in the last year for loss of balance without improvement. She endorses during that time in the last year having intermittent dizziness. She smoke a half a pack of cigarettes daily and 2-3 drinks of liquor daily. She states her last colonoscopy and mammogram were about 5-7 years ago but she cannot remember when she had her last pap smear but says it was normal at that time. She denies changes in weight, fatigue, weakness, numbness or tingling. She denies changes in bowel habits, cough, SOB, or menstrual irregularities. She denies any family history of cancer.      PAST HISTORY:     Past Medical History:   Diagnosis Date    Hyperlipidemia     Hypertension     Vitamin D deficiency        Past Surgical History:   Procedure Laterality Date    ANKLE SURGERY Left     COLONOSCOPY         SocialHistory:   The patient lives at    Alcohol: Yes, 2-3 glasses of liquor daily  Illicit drugs: no use  Tobacco:  Yes, 1/2 a pack daily     Family History:  Family History   Problem Relation Age of Onset    Stroke Mother     Cancer Father        MEDICATIONS:     No current facility-administered medications on file prior to encounter. Current Outpatient Medications on File Prior to Encounter   Medication Sig Dispense Refill    cyclobenzaprine (FLEXERIL) 10 MG tablet TAKE 1 TABLET BY MOUTH THREE TIMES DAILY      diclofenac (CATAFLAM) 50 MG tablet TAKE 1 TABLET BY MOUTH TWICE DAILY      vitamin B-1 (THIAMINE) 100 MG tablet Take 1 tablet by mouth daily for 7 days 7 tablet 0    atenolol (TENORMIN) 100 MG tablet TK 1 T PO D      Hydroquinone 4 % EMUL bid (Patient not taking: Reported on 6/9/2020) 1 Bottle 2    atorvastatin (LIPITOR) 40 MG tablet TAKE 1 TABLET EVERY DAY 90 tablet 0    ergocalciferol (DRISDOL) 15834 UNITS capsule Take 1 capsule by mouth once a week. 4 capsule 2         Scheduled Meds:   atorvastatin  40 mg Oral Daily    levetiracetam  500 mg IntraVENous Q12H    dexamethasone  4 mg Oral 4 times per day    pantoprazole  40 mg IntraVENous Daily      Continuous Infusions:   dextrose       PRN Meds:acetaminophen, ondansetron **OR** ondansetron, glucose, dextrose bolus **OR** dextrose bolus, glucagon (rDNA), dextrose, labetalol    Allergies: No Known Allergies    REVIEW OF SYSTEMS:       History obtained from patient    Review of Systems   Constitutional: Negative for appetite change, fatigue, fever and unexpected weight change. HENT: Negative for trouble swallowing. Eyes: Negative for visual disturbance. Respiratory: Negative for cough and shortness of breath. Cardiovascular: Negative for chest pain and palpitations. Gastrointestinal: Negative for abdominal pain, constipation, diarrhea, nausea and vomiting. Endocrine: Negative. Genitourinary: Negative. Musculoskeletal: Positive for gait problem. Skin: Negative.     Neurological: Positive for dizziness and light-headedness. Negative for seizures, weakness, numbness and headaches. Hematological: Negative. PHYSICAL EXAM:       Vitals: BP (!) 157/91   Pulse 76   Temp 98.2 °F (36.8 °C) (Oral)   Resp 20   Ht 5' 5\" (1.651 m)   Wt 139 lb 15.9 oz (63.5 kg)   SpO2 91%   BMI 23.30 kg/m²     I/O:      Intake/Output Summary (Last 24 hours) at 5/10/2022 0802  Last data filed at 5/10/2022 0600  Gross per 24 hour   Intake    Output 300 ml   Net -300 ml     No intake/output data recorded. I/O last 3 completed shifts:  In: -   Out: 300 [Urine:300]    Physical Examination:     Physical Exam  Constitutional:       Appearance: Normal appearance. HENT:      Head: Normocephalic and atraumatic. Nose: Nose normal.   Eyes:      Extraocular Movements: Extraocular movements intact. Conjunctiva/sclera: Conjunctivae normal.      Pupils: Pupils are equal, round, and reactive to light. Cardiovascular:      Rate and Rhythm: Normal rate and regular rhythm. Pulmonary:      Effort: Pulmonary effort is normal.      Breath sounds: Normal breath sounds. Abdominal:      General: Abdomen is flat. Bowel sounds are normal.      Palpations: Abdomen is soft. Musculoskeletal:      Right lower leg: No edema. Left lower leg: No edema. Skin:     General: Skin is warm and dry. Capillary Refill: Capillary refill takes less than 2 seconds. Neurological:      General: No focal deficit present. Mental Status: She is alert and oriented to person, place, and time. Mental status is at baseline.            Access:                            -Peripheral Access Day#: 1                             Dominguez Day#: 1      Recent Labs     05/09/22  1848   PHART 7.361   [id number] 39.2   PO2ART 141.0*           DATA:       Labs:  CBC:   Recent Labs     05/09/22  1815   WBC 8.3   HGB 16.8*   HCT 52.3*          BMP:   Recent Labs     05/09/22  1910      K 4.9   CL 98*   CO2 17*   BUN 12 CREATININE 0.9   GLUCOSE 115*     LFT's:   Recent Labs     05/09/22 1910   AST 52*   ALT 28   BILITOT 0.6   ALKPHOS 96     Troponin:   Recent Labs     05/09/22 1910   TROPONINI <0.01     BNP:No results for input(s): BNP in the last 72 hours. ABGs:   Recent Labs     05/09/22 1848   PHART 7.361   MHY7WDS 39.2   PO2ART 141.0*     INR:   Recent Labs     05/09/22 1815   INR 0.98       U/A:  Recent Labs     05/09/22  1848 05/09/22  1848 05/10/22  0655   COLORU DARK YELLOW*  --   --    PHUR 5.5   < > 7.0   WBCUA 3  --   --    RBCUA 2  --   --    BACTERIA None Seen  --   --    CLARITYU Clear  --   --    SPECGRAV 1.025  --   --    LEUKOCYTESUR TRACE*  --   --    UROBILINOGEN 1.0  --   --    BILIRUBINUR SMALL*  --   --    BLOODU Negative  --   --    GLUCOSEU Negative  --   --     < > = values in this interval not displayed. CT HEAD WO CONTRAST   Final Result   1. Stable appearance of intra-axial left frontal lobe and left temporal lobe hemorrhage with vasogenic edema. 2. Patient status post seizure which may account for hemorrhage, posttraumatic, however, Magnetic resonance imaging without and with contrast is suggested to exclude underlying occult pathology. CT CHEST ABDOMEN PELVIS W WO CONTRAST    (Results Pending)   MRI BRAIN W WO CONTRAST    (Results Pending)       EKG:   Echo:  Micro:     ASSESSMENT AND PLAN:   Santos Koo is a 60 y.o. female w/PMHx of HTN and HLD presenting with seizure activity.     Neuro  #Intraparenchymal Hemorrhage in LT Temporal Region   -13mm hyperdensity found on repeat CT Head representing hemorrhage and vasogenic edema  -Pending MRI brain w/ and w/o contrast   -S/P Decadron and Keppra loading in ED at [facility] before transfer to [facility]  -Continue decadron 4mg Q6H  -Continue Keppra 500mg Q12H  -SBP <160 mmg  -Neuro consulted  -Neuro checks Q1H    #LT Frontal Hyperdensity  -2.5 x 2cm Hyperdensity in LT frontal region on repeat CT Head   -Vasogenic edema and hemorrhage  --CT C/A/P to rule out primary neoplasm with metastasis     #Seizure  -No prior Hx of Seizures  -Continue Keppra 500mg Q12H    Cardio  -HLD, continue statin  -HTN, Labetalol prn with SBP <160mmgHg     GI PPX: Continue pantoprazole    DVT PPX: SCDs      Code Status:Full Code  FEN: NPO  PPX:  SCDs  DISPO: ICU    This patient has been staffed and discussed with Dr. Tobie Koyanagi    This note was scribed by Tessa Hook who participated in patient care.  -----------------------------  Shay Cisneros MD  PGY2 IM Internal Medicine Resident   5/10/2022  8:02 AM     Patient was seen, examined and discussed with Dr. Viet Lopez. I agree with the history of present illness, past medical/surgical histories, family history, social history, medication list and allergies as listed. The review of systems is as noted above. My physical exam confirms the findings listed above. Chart was reviewed including labs, CXR, CT scan, EKG and medical records confirm the findings noted above. I edited the note where appropriate.     New onset seizure  Left frontal mass with vasogenic edema   Left temporal parenchymal bleed - f/u CT is stable   Smoker   HTN: above target. Had one dose of labetalol this morning   Discussed with neurology     MRI brain is pending   CT chest with suspicious nodule with mediastinal and hilar LAP. On Keppra. Seizure free at this time    Diagnosis can be obtained by bronch with EBUS, however if she is going to have resection of brain mass this will give us diagnosis and staging at the same time. Will discuss with neurosurgery. MRI is pending to see if she has additional brain lesions not seen well on CT.

## 2022-05-10 NOTE — PROGRESS NOTES
Occupational Therapy  Facility/Department: [address] [facility] ICU  Occupational Therapy Initial Assessment & Tx    Name: Luci Dillon  : 1965  MRN: [id number]  Date of Service: 5/10/2022    Discharge Recommendations: Luci Dillon scored a 18/24 on the AM-PAC ADL Inpatient form. Current research shows that an AM-PAC score of 18 or greater is typically associated with a discharge to the patient's home setting. Based on the patient's AM-PAC score, and their current ADL deficits, it is recommended that the patient have 2-3 sessions per week of Occupational Therapy at d/c to increase the patient's independence. At this time, this patient demonstrates the endurance and safety to discharge home with (home vs OP services) and a follow up treatment frequency of 2-3x/wk. Please see assessment section for further patient specific details. If patient discharges prior to next session this note will serve as a discharge summary. Please see below for the latest assessment towards goals. OT Equipment Recommendations  Equipment Needed: Yes  Mobility Devices: ADL Assistive Devices  ADL Assistive Devices: Shower Chair with back       Patient Diagnosis(es): There were no encounter diagnoses. Past Medical History:  has a past medical history of Hyperlipidemia, Hypertension, ICH (intracerebral hemorrhage), and Vitamin D deficiency. Past Surgical History:  has a past surgical history that includes Ankle surgery (Left) and Colonoscopy. Treatment Diagnosis: impaired mobility, transfers, ADL      Assessment   Performance deficits / Impairments: Decreased functional mobility ; Decreased ADL status; Decreased endurance;Decreased safe awareness;Decreased cognition;Decreased balance  Assessment: Pt from home with , found down at home, hemorrhage. Pt IND pta. Pt demo decreased cognition and safety awareness this date, impulsive. Pt has balance deficits at baseline 2/2 vestibular condition per family.  Pt completing mobility an transfers with CGA to January. Unsteady at times, max cues for transfers 2/2 safety. Assist with ADLs. would benefit from cont skilled OT while in acute care. Requires 24hr hands on assist and spvn at dc 2/2 impaired safety awareness and balance. Demo no insight into deficits and condition/ diagnosis. Treatment Diagnosis: impaired mobility, transfers, ADL  Decision Making: Medium Complexity  REQUIRES OT FOLLOW-UP: Yes  Activity Tolerance  Activity Tolerance: Patient Tolerated treatment well;Treatment limited secondary to decreased cognition        Plan   Plan  Times per Week: 5-7  Times per Day: Daily     Restrictions  Position Activity Restriction  Other position/activity restrictions: up with assist    Subjective   General  Chart Reviewed: Yes  Additional Pertinent Hx: 60 y.o. female, who was admitted for seizure and noted to have CT head with 3 cm hyperdense mass in left frontal lobe and 2.5 cm acute intraparenchymal hemorrhage. CT 5/10 1. Stable appearance of intra-axial left frontal lobe and left temporal lobe hemorrhage with vasogenic edema. 2. Patient status post seizure which may account for hemorrhage, posttraumatic, however, Magnetic resonance imaging without and with contrast is suggested to exclude underlying occult pathology. Referring Practitioner: Ed Colin MD  Diagnosis: intraparenchymal hemorrhage of brain  Subjective  Subjective:  In bed agreeable to session,  and sister present     Social/Functional History  Social/Functional History  Lives With: Spouse  Type of Home: House  Home Layout: Two level,Bed/Bath upstairs,1/2 bath on main level  Home Access: Stairs to enter with rails  Entrance Stairs - Number of Steps: 5-6 x2 bilevel home, no steps from outside  Bathroom Shower/Tub: Tub/Shower unit  [employer]: Handicap height  Bathroom Equipment:  (none)  Home Equipment: Walker, standard  Has the patient had two or more falls in the past year or any fall with injury in the past year?: Yes (reporting falls in the past year 2/2 vestibular balance issues but becoming more IND in the last 6 months)  ADL Assistance: Independent  Homemaking Assistance: Independent  Ambulation Assistance: Independent  Transfer Assistance: Independent  Active : Yes  Occupation: Full time employment  Additional Comments: Pt family assisting with PLOF pt reporting inconsistent information family correcting at times. Pt has had falls in the past - received PT at OP balance disorder clinic. Objective   Pulse: 72  Heart Rate Source: Monitor  BP: (!) 154/77  BP Location: Left upper arm  BP Method: Automatic  Patient Position: Semi fowlers  MAP (Calculated): 102.67  Resp: 19  SpO2: 95 %  O2 Device: Nasal cannula  Vision Exceptions: Wears glasses at all times  Hearing: Within functional limits          Safety Devices  Type of Devices: Bed alarm in place;Call light within reach;Gait belt;Nurse notified; Left in bed;Patient at risk for falls (left in bed per RN request)  Balance  Sitting: Intact  Standing: With support (CGA to January)  Gait  Overall Level of Assistance: Minimum assistance  Assistive Device:  (none)  Toilet Transfers  Toilet - Technique: Ambulating  Equipment Used: Standard toilet  Toilet Transfer: Minimal assistance;Contact guard assistance  Toilet Transfers Comments: max cues  AROM: Within functional limits (BUE)  Strength: Within functional limits (BUE)  ADL  Feeding: Supervision  Grooming: Contact guard assistance (stance at sink, oral care, wash face and hands)  LE Dressing: Stand by assistance (donning socks EOB)  Toileting:  (VC, confused at times, with catheter but repeadedly trying to wipe at toilet, despite explanation of catheter)     Activity Tolerance  Activity Tolerance Comments: Pt found with O2 turned on but not in place. O2 sats 93%. Pt left on RA with O2 sats 92-93%. RN aware.   Bed mobility  Supine to Sit: Stand by assistance  Sit to Supine: Stand by assistance  Scooting: Stand by assistance  Transfers  Sit to stand: Contact guard assistance;Minimal assistance  Stand to sit: Contact guard assistance;Minimal assistance  Transfer Comments: max cues     Cognition  Overall Cognitive Status: Exceptions  Arousal/Alertness: Appropriate responses to stimuli  Following Commands: Follows one step commands with repetition  Attention Span: Difficulty dividing attention; Difficulty attending to directions  Memory: Decreased recall of recent events;Decreased short term memory  Safety Judgement: Decreased awareness of need for safety  Problem Solving: Assistance required to generate solutions;Assistance required to implement solutions;Decreased awareness of errors  Insights: Decreased awareness of deficits  Initiation: Does not require cues  Sequencing: Requires cues for some                  Education Given To: Patient; Family  Education Provided: Role of Therapy;Plan of Care;ADL Adaptive Strategies;Transfer Training  Education Method: Demonstration;Verbal  Barriers to Learning: Cognition  Education Outcome: Continued education needed                             AM-PAC Score        AM-[facility] Inpatient Daily Activity Raw Score: 18 (05/10/22 1546)  AM-PAC Inpatient ADL T-Scale Score : 38.66 (05/10/22 1546)  ADL Inpatient CMS 0-100% Score: 46.65 (05/10/22 1546)  ADL Inpatient CMS G-Code Modifier : CK (05/10/22 1546)    Goals  Short Term Goals  Time Frame for Short term goals: dc  Short Term Goal 1: supine to sit IND  Short Term Goal 2: toileting SPVN  Short Term Goal 3: FX mobility SPVN  Short Term Goal 4: UB/LB dressing SPVN  Short Term Goal 5: grooming SPVN in stance at sink       Therapy Time   Individual Concurrent Group Co-treatment   Time In 1355         Time Out 1435         Minutes 40           Timed Code Treatment Minutes:   25    Total Treatment Minutes:  700 [facility]

## 2022-05-10 NOTE — ED PROVIDER NOTES
[address] [facility]  EMERGENCY DEPARTMENTENCOUNTER      Pt Name: Willow Santizo  MRN: [id number]  [place] 1965  Date ofevaluation: 5/9/2022  Provider: Vivian Burden MD    CHIEF COMPLAINT       Chief Complaint   Patient presents with    Altered Mental Status     pt brought in by ems from home, found down at home last known normal at 1430 today. ems witnessed seizure       HPI    HISTORY OF PRESENT ILLNESS   (Location/Symptom, Timing/Onset,Context/Setting, Quality, Duration, Modifying Factors, Severity)  Note limiting factors. Willow Santizo is a 60 y.o. female who presents to the emergency department after being found down at home. This is a otherwise healthy 57-year-old female who was found down on the carpet by her  at home prior to arrival.  When EMS arrived there is no seizure-like activity. The patient apparently had bit her tongue and there is some blood by the carpet. The patient was last seen normal about 1/2-hour prior to this. The patient was spontaneously breathing. There is been no history of any trauma. The patient's  states that she does have history of balance problems and has been worked up for that recently including a recent MRI of the brain around 2 years ago. She smokes cigarettes and drinks alcohol. NursingNotes were reviewed. Review of Systems    REVIEW OF SYSTEMS    (2-9 systems for level 4, 10 or more for level 5)     Review of Systems   Constitutional: Negative for fever. HENT: Negative for rhinorrhea and sore throat. Eyes: Negative for redness. Respiratory: Negative for shortness of breath. Cardiovascular: Negative for chest pain. Gastrointestinal: Negative for abdominal pain. Genitourinary: Negative for flank pain. Neurological: Negative for headaches. Hematological: Negative for adenopathy. Psychiatric/Behavioral: Negative for confusion.               Except as noted above the remainder of the review of systems was reviewed and negative. PAST MEDICAL HISTORY     Past Medical History:   Diagnosis Date    Hyperlipidemia     Hypertension     Vitamin D deficiency          SURGICALHISTORY       Past Surgical History:   Procedure Laterality Date    ANKLE SURGERY Left     COLONOSCOPY           CURRENT MEDICATIONS       Previous Medications    ATENOLOL (TENORMIN) 100 MG TABLET    TK 1 T PO D    ATORVASTATIN (LIPITOR) 40 MG TABLET    TAKE 1 TABLET EVERY DAY    CYCLOBENZAPRINE (FLEXERIL) 10 MG TABLET    TAKE 1 TABLET BY MOUTH THREE TIMES DAILY    DICLOFENAC (CATAFLAM) 50 MG TABLET    TAKE 1 TABLET BY MOUTH TWICE DAILY    ERGOCALCIFEROL (DRISDOL) 85357 UNITS CAPSULE    Take 1 capsule by mouth once a week. HYDROQUINONE 4 % EMUL    bid    VITAMIN B-1 (THIAMINE) 100 MG TABLET    Take 1 tablet by mouth daily for 7 days       ALLERGIES     Patient has no known allergies. FAMILY HISTORY       Family History   Problem Relation Age of Onset    Stroke Mother     Cancer Father           SOCIAL HISTORY       Social History     Socioeconomic History    Marital status:      Spouse name: Shauna Solomon    Number of children: None    Years of education: None    Highest education level: None   Occupational History    None   Tobacco Use    Smoking status: Current Every Day Smoker     Packs/day: 1.00     Years: 30.00     Pack years: 30.00     Types: Cigarettes    Smokeless tobacco: Never Used    Tobacco comment: pt states she has chantix at home hasnt started yet   Vaping Use    Vaping Use: None   Substance and Sexual Activity    Alcohol use:  Yes     Alcohol/week: 2.0 - 4.0 standard drinks     Types: 2 - 4 Shots of liquor per week     Comment: vodka daily    Drug use: No    Sexual activity: Yes     Partners: Male   Other Topics Concern    None   Social History Narrative    None     Social Determinants of Health     Financial Resource Strain:     Difficulty of Paying Living Expenses: Not on file Food Insecurity:     Worried About Running Out of Food in the Last Year: Not on file    [place] of Food in the Last Year: Not on file   Transportation Needs:     Lack of Transportation (Medical): Not on file    Lack of Transportation (Non-Medical): Not on file   Physical Activity:     Days of Exercise per Week: Not on file    Minutes of Exercise per Session: Not on file   Stress:     Feeling of Stress : Not on file   Social Connections:     Frequency of Communication with Friends and Family: Not on file    Frequency of Social Gatherings with Friends and Family: Not on file    Attends Congregation Services: Not on file    Active Member of [address] [place] [address] [place] or Organizations: Not on file    Attends Club or Organization Meetings: Not on file    Marital Status: Not on file   Intimate Partner Violence:     Fear of Current or Ex-Partner: Not on file    Emotionally Abused: Not on file    Physically Abused: Not on file    Sexually Abused: Not on file   Housing Stability:     Unable to Pay for Housing in the Last Year: Not on file    Number of [place] in the Last Year: Not on file    Unstable Housing in the Last Year: Not on file       SCREENINGS    [place] Coma Scale  Eye Opening: To pain  Best Verbal Response: None  Best Motor Response: Withdraws from pain  [place] Coma Scale Score: 7        PHYSICAL EXAM    (up to 7 for level 4, 8 or more for level 5)     ED Triage Vitals   BP Temp Temp Source Pulse Resp SpO2 Height Weight   05/09/22 1836 05/09/22 2030 05/09/22 2030 05/09/22 1836 05/09/22 1836 05/09/22 1836 -- 05/09/22 1836   (!) 141/88 98.8 °F (37.1 °C) Oral 99 20 100 %  156 lb (70.8 kg)       Physical Exam:      General Appearance:   Confused and appeared to be postictal,appears stated age. Head:  Normocephalic, without obvious abnormality, atraumatic. Eyes:  conjunctiva/corneas clear, EOM's intact. Sclera anicteric.    ENT:  Mucous remains are moist and pink   Neck: Supple, symmetrical, trachea midline, no adenopathy. No jugular venous distention. Lungs:    Clear to auscultation bilaterally   Chest Wall:   No pain to palpation   Heart:   Genitourinary:  Regular rate rhythm with no murmurs rubs gallops  Unremarkable   Abdomen:    Soft and benign with no guarding rebound   Extremities:  No clubbing cyanosis or edema   Pulses:  Good throughout   Skin:  No rashes or lesions to exposed skin. Neurologic:  Confused but moving her extremities. I would rate her GCS of around 7.. DIAGNOSTIC RESULTS     EKG: All EKG's are interpreted by the Emergency Department Physician who either signs or Co-signsthis chart in the absence of a cardiologist.    Sinus rhythm at a rate of 97 beats a minute with no acute ST elevations or depressions or pathologic Q waves. Normal axis. RADIOLOGY:   Non-plain filmimages such as CT, Ultrasound and MRI are read by the radiologist. Plain radiographic images are visualized and preliminarily interpreted by the emergency physician with the below findings:    See below    Interpretation per the Radiologist below, if available at the time ofthis note: All incidental findings were discussed with the patient. XR CHEST PORTABLE   Final Result   Increased perihilar markings. Atelectasis, infectious or inflammatory airway   process, and interstitial edema are in the differential.      Nonspecific left basilar opacity. Correlate with presentation. CT Head WO Contrast   Final Result   A 3 cm hyperdense mass in the left frontal lobe associated with vasogenic   edema, suggestive of neoplastic/metastatic process. Additionally, there is a 2.5 cm acute intraparenchymal hemorrhage in the left   temporal lobe associated with mild edema. Follow-up MRI brain with and without contrast is recommended for further   evaluation. Critical results were called by Dr. Almendarez Borne to Dr. Tristin Banks on 5/9/2022   at 19:13.                ED BEDSIDE ULTRASOUND:   Performed by ED Physician - none    LABS:  Labs Reviewed   CBC WITH AUTO DIFFERENTIAL - Abnormal; Notable for the following components:       Result Value    Hemoglobin 16.8 (*)     Hematocrit 52.3 (*)     .8 (*)     MCH 35.4 (*)     All other components within normal limits    Narrative:     Juliocesarcorey Alcantar  [facility] tel. [phone],  Rejected Test Name/Called to:DANIEL LAFLEUR/ NICOLE Lopez, 05/09/2022 18:40,  by [place]   BLOOD GAS, VENOUS - Abnormal; Notable for the following components:    pH, Reinaldo 7.071 (*)     pCO2, Reinaldo 35.5 (*)     pO2, Reinaldo 173.0 (*)     HCO3, Venous 10.3 (*)     Base Excess, Reinaldo -19.1 (*)     Carboxyhemoglobin 10.7 (*)     All other components within normal limits    Narrative:     Juliocesarcorey Alcantar  [facility] tel. [phone],  Chemistry results called to and read back by RN Oksana Pallas, 05/09/2022  19:11, by [facility]  Chemistry results called to and read back by RN Oksana Pallas, 05/09/2022  18:48, by [facility]  Chemistry results called to and read back by N[address], 05/09/2022  18:47, by [facility]   URINALYSIS WITH REFLEX TO CULTURE - Abnormal; Notable for the following components:    Color, UA DARK YELLOW (*)     Bilirubin Urine SMALL (*)     Protein,  (*)     Leukocyte Esterase, Urine TRACE (*)     All other components within normal limits   APTT - Abnormal; Notable for the following components:    aPTT 44.5 (*)     All other components within normal limits   COMPREHENSIVE METABOLIC PANEL W/ REFLEX TO MG FOR LOW K - Abnormal; Notable for the following components:    Chloride 98 (*)     CO2 17 (*)     Anion Gap 22 (*)     Glucose 115 (*)     AST 52 (*)     All other components within normal limits   BLOOD GAS, ARTERIAL - Abnormal; Notable for the following components:    pO2, Arterial 141.0 (*)     Hemoglobin, Art, Extended 16.1 (*)     Carboxyhgb, Arterial 7.8 (*)     All other components within normal limits   LACTATE, SEPSIS   SPECIMEN REJECTION    Narrative:     CALL  [facility] tel. [phone],  Rejected Test Name/Called to:DANIEL LAFLEUR/ RN Lizzy Blnad, 05/09/2022 18:40,  by Komal Thomas   PROTIME-INR   ETHANOL   TROPONIN   MICROSCOPIC URINALYSIS   LACTATE, SEPSIS   PROTIME-INR   APTT       All other labs were within normal range or not returned as of this dictation. EMERGENCY DEPARTMENT COURSE and DIFFERENTIAL DIAGNOSIS/MDM:   Vitals:    Vitals:    05/09/22 2025 05/09/22 2030 05/09/22 2035 05/09/22 2040   BP:  (!) 139/98     Pulse: 80 76 75 77   Resp: 19 23 24 22   Temp:  98.8 °F (37.1 °C)     TempSrc:  Oral     SpO2: 100% 100% 99% 100%   Weight:               MDM    The patient has remained stable but in critical condition throughout her hospital course. After my initial evaluation the patient was breathing spontaneously. She had a positive gag reflex. She appeared be postictal and was able to withdraw from pain and was moving all of her extremities. Pupils were equal and reactive. Laboratory results so far are unremarkable except for initially acidotic venous blood gas. Oxygen was administered. A CT of the patient's head was obtained that shows 2 areas of hemorrhage. The radiologist thought this was probably secondary to an underlying mass and probably metastatic. She also had some vasogenic edema around the masses. The patient was given Ativan initially as well as Keppra and Decadron. The patient is currently sleeping but is arousable and appears to be more alert and family is at bedside. I have contacted [facility] [employer] and Dr. Eufemia Alonzo has graciously accepted the care of the patient and agrees with my care. I have contacted the hospitalist Dr. Robby Owens who is also graciously excepted care and transfer the patient. The patient be transferred by MICU preferably and possibly by air. She is currently stable but in serious condition. REASSESSMENT          CRITICAL CARE TIME   Total Critical Care time was 65 minutes, excluding separatelyreportable procedures.   There was a high probability ofclinically significant/life threatening deterioration in the patient's condition which required my urgent intervention. CONSULTS:  None    PROCEDURES:  Unless otherwise noted below, none     Procedures    FINAL IMPRESSION      1. Generalized seizure ([address]    2. Intracranial hemorrhage [facility])          DISPOSITION/PLAN   DISPOSITION Decision To Transfer 05/09/2022 08:54:16 PM      PATIENT REFERREDTO:  No follow-up provider specified. DISCHARGEMEDICATIONS:  New Prescriptions    No medications on file     Controlled Substances Monitoring:     No flowsheet data found.     (Please note that portions of this note were completed with a voice recognition program.  Efforts were made to edit the dictations but occasionally words are mis-transcribed.)    Wolf Tucker MD (electronically signed)  Attending Emergency Physician          Wolf Tucker MD  05/09/22 0658

## 2022-05-10 NOTE — PROGRESS NOTES
NEUROSURGERY CONSULT NOTE    Beverley Ramachandran  [id number]   1965   5/10/2022    Requesting physician: Latonya Johnson DO    Reason for consultation:Brain mass and ICH    History of present illness: Patient is a 60 y.o. female w/ PMH of  HTN/HLD presented with seizure. Found down naked in room and appeared to have bitten tongue per chart review. She was loaded with decadron and keppra and transferred to [facility].     On examination, patient is AOx3. She does not recall having a seizure earlier today. She is slow to respond but answering questions appropriately. She does not recall biting her tongue. She denies fever, chills, chest pain, shortness of breath, nausea, vomiting, constipation, diarrhea. She denies family history of cancer. ROS:   GENERAL:  Denies fever or recent illness. Denies weight changes   EYES:  Denies vision change or diplopia  EARS:  Denies hearing loss  CARDIAC:  Denies chest pain  RESPIRATORY:  Denies shortness of breath  SKIN:  Denies rash or lesions   HEM:  Denies excessive bruising  PSYCH:  Denies anxiety or depression  NEURO:  Denies headache, numbness or tingling or lateralizing weakness   :  Denies urinary difficulty  GI: Denies nausea, vomiting, diarrhea or constipation  MUSCULOSKELETAL:  No arthralgias    No Known Allergies    Past Medical History:   Diagnosis Date    Hyperlipidemia     Hypertension     Vitamin D deficiency         Past Surgical History:   Procedure Laterality Date    ANKLE SURGERY Left     COLONOSCOPY         Social History     Occupational History    Not on file   Tobacco Use    Smoking status: Current Every Day Smoker     Packs/day: 1.00     Years: 30.00     Pack years: 30.00     Types: Cigarettes    Smokeless tobacco: Never Used    Tobacco comment: pt states she has chantix at home hasnt started yet   Vaping Use    Vaping Use: Not on file   Substance and Sexual Activity    Alcohol use:  Yes     Alcohol/week: 2.0 - 4.0 standard drinks     Types: 2 - 4 Shots of liquor per week     Comment: vodka daily    Drug use: No    Sexual activity: Yes     Partners: Male        Family History   Problem Relation Age of Onset    Stroke Mother     Cancer Father         No outpatient medications have been marked as taking for the 5/10/22 encounter [facility] Encounter).         Current Facility-Administered Medications   Medication Dose Route Frequency Provider Last Rate Last Admin    atorvastatin (LIPITOR) tablet 40 mg  40 mg Oral Daily Amarjit Zamora MD        acetaminophen (TYLENOL) tablet 650 mg  650 mg Oral Q4H PRN Amarjit Fountain MD        ondansetron (ZOFRAN-ODT) disintegrating tablet 4 mg  4 mg Oral Q8H PRN Luisa Appiah MD        Or    ondansetron (ZOFRAN) injection 4 mg  4 mg IntraVENous Q6H PRN Luisa Appiah MD        glucose chewable tablet 16 g  4 tablet Oral PRN Luisa Appiah MD        dextrose bolus 10% 125 mL  125 mL IntraVENous PRN Amarjit Funk MD        Or    dextrose bolus 10% 250 mL  250 mL IntraVENous PRN Luisa Appiah MD        glucagon (rDNA) injection 1 mg  1 mg IntraMUSCular PRN Luisa Appiah MD        dextrose 5 % solution  100 mL/hr IntraVENous PRN Luisa Appiah MD        dexamethasone (DECADRON) tablet 4 mg  4 mg Oral 4 times per day Luisa Appiah MD   4 mg at 05/10/22 0500    labetalol (NORMODYNE;TRANDATE) injection 10 mg  10 mg IntraVENous Q4H PRN Luisa Appiah MD   10 mg at 05/10/22 0458    pantoprazole (PROTONIX) injection 40 mg  40 mg IntraVENous Daily JACQUIE Deras NP   40 mg at 05/10/22 0910    levETIRAcetam (KEPPRA) 1,000 mg in sodium chloride 0.9 % 100 mL IVPB  1,000 mg IntraVENous Q12H JACQUIE Hayes CNP        atenolol (TENORMIN) tablet 100 mg  100 mg Oral Daily JACQUIE Robles CNP            Objective:  BP (!) 157/91   Pulse 76   Temp 98.2 °F (36.8 °C) (Oral)   Resp 20   Ht 5' 5\" (1.651 m)   Wt 139 lb 15.9 oz (63.5 kg)   SpO2 91%   BMI 23.30 kg/m²     Physical Exam:   Patient seen and examined  GCS:  4 - Opens eyes on own  4 - Seems confused, disoriented  5 - Pushes away noxious stimulus  General: Well developed. Alert and cooperative in no acute distress. HENT: atraumatic, neck supple  Eyes: Optic discs: Not tested  Pulmonary: unlabored respiratory effort  Cardiovascular:  Warm well perfused. No peripheral edema  Gastrointestinal: abdomen soft, NT, ND    Neurological:  Mental Status: Awake, alert, oriented x 4, speech clear and appropriate  Attention: Intact  Language: No aphasia or dysarthria noted  Sensation: Intact to all extremities to light touch  Coordination: Intact  DTRs:    Right  Left    Martinez's     biceps      brachioradialis       Patella       ankle clonus      toes (babinski)        Cranial Nerves:  II: Visual acuity not tested, denies new visual changes / diplopia  III, IV, VI: PERRL, 3 mm bilaterally, EOMI, no nystagmus noted  V: Facial sensation intact bilaterally to touch  VII: Face symmetric  VIII: Hearing intact bilaterally to spoken voice  IX: Palate movement equal bilaterally  XI: Shoulder shrug equal bilaterally  XII: Tongue midline    Musculoskeletal:   Gait: Not tested   Assist devices: None   Tone: normal  Motor strength:    Right  Left    Right  Left    Deltoid  5 5  Hip Flex  5 5   Biceps  5 5  Knee Extensors  5 5   Triceps  5 5  Knee Flexors  5 5   Wrist Ext  5 5  Ankle Dorsiflex. 5 5   Wrist Flex  5 5  Ankle Plantarflex. 5 5   Handgrip  5 5  Ext Luis E Longus  5 5   Thumb Ext  5 5         Radiological Findings:  CT HEAD WO CONTRAST    Result Date: 5/10/2022  1. Stable appearance of intra-axial left frontal lobe and left temporal lobe hemorrhage with vasogenic edema. 2. Patient status post seizure which may account for hemorrhage, posttraumatic, however, Magnetic resonance imaging without and with contrast is suggested to exclude underlying occult pathology.     CT Head WO Contrast    Result Date: 5/9/2022  A 3 cm hyperdense mass in the left frontal lobe associated with vasogenic edema, suggestive of neoplastic/metastatic process. Additionally, there is a 2.5 cm acute intraparenchymal hemorrhage in the left temporal lobe associated with mild edema. Follow-up MRI brain with and without contrast is recommended for further evaluation. Critical results were called by Dr. Cherie Booker to Dr. Lazaro Comment on 5/9/2022 at 19:13. XR CHEST PORTABLE    Result Date: 5/9/2022  Increased perihilar markings. Atelectasis, infectious or inflammatory airway process, and interstitial edema are in the differential. Nonspecific left basilar opacity. Correlate with presentation. Labs:  Recent Labs     05/09/22  1815   WBC 8.3   HGB 16.8*   HCT 52.3*          Recent Labs     05/09/22  1910      K 4.9   CL 98*   CO2 17*   BUN 12   CREATININE 0.9   GLUCOSE 115*   CALCIUM 9.8       Recent Labs     05/09/22  1815   PROTIME 11.1   INR 0.98   APTT 44.5*       Patient Active Problem List    Diagnosis Date Noted    Intraparenchymal hemorrhage of brain ([facility] [address] 05/10/2022    Dyslipidemia     Vertigo 03/11/2020    HTN (hypertension), benign     Vitamin D deficiency        Assessment:  Patient is a 60 y.o. female w/ brain mass and ICH    Plan:  1. No emergent neurosurgical intervention indicated  2. Neurologic exams frequency: Q1H  3. Brain Mass:  - MRI Brain w wo to assist with establish likely diagnosis  4. Cerebral edema:  - Keep Na within normal limits  - Decadron 4 mg Q6H  - Keep HOB >30 degrees  5. Seizure prophylaxis: Keppra 500mg BID  6. GI Prophylaxis: Pepcid  7. DVT Prophylaxis: SCD's  8. Pain: Managed by medical team  9. Speech consulted for swallow eval, appreciate recs  10. CT chest abdomen and pelvis  11. PT/OT consulted, appreciate recs  12. Advance diet / activity per primary team  13. Appreciate critical care team assistance in management  14. Thank you for consult. Will follow inpatient.   Please call with any questions or decline in neurological status. Stay in ICU    DISPO: Remain inpatient from neurosurgery standpoint. Patient was seen and examined with Dr. Kelly Larson who agrees with above assessment and plan. Electronically signed by:  JACQUIE Sosa CNP, APRN-CNP, 5/10/2022 10:24 AM  [phone]

## 2022-05-10 NOTE — CONSULTS
Neurology / Neurocritical Care Consult Note    Sivakumar Sena MD is requesting this consult. Reason for Consult: Seizure and [address]  Admission Chief Complaint: Event from home with generalized shaking, tongue biting    History of Present Illness     Viktoria Rogers is a 60 y.o. y/o female with PMH significant for HTN, HLD who presented from home with spell concerning for seizure. Burnkelley Cazaresler 1430 on 5/9/22. She was found down naked on the floor and appeared to have bitten her tongue. Noted to have pH 7.071 and CO2 25 on VBG. Repeat ABG with PH 7.36 CO2 39. She was noted to have intraparenchymal hemorrhage with brain mass on CT. She was loaded with decadron and keppra and transferred to [facility].     History taken from chart review as patient is amnestic to yesterday entirely. She denies any recent headaches or feeling ill. States she been in her normal state of health. REVIEW OF SYSTEMS:   Constitutional- No weight loss or fevers   Eyes- No diplopia. No photophobia. Ears/nose/throat- No dysphagia. No Dysarthria   Cardiovascular- No palpitations. No chest pain   Respiratory- No dyspnea. No Cough   Gastrointestinal- No Abdominal pain. No Vomiting. Genitourinary- No incontinence. No urinary retention   Musculoskeletal- No myalgia. No arthralgia   Skin- No rash. No easy bruising. Psychiatric- No depression. No anxiety   Endocrine- No diabetes. No thyroid issues. Hematologic- No bleeding difficulty.  No fatigue   Neurologic- amnestic to yesterday    Past Medical, Surgical, Family, and Social History   PAST MEDICAL HISTORY:  Past Medical History:   Diagnosis Date    Hyperlipidemia     Hypertension     Vitamin D deficiency      SURGICAL HISTORY:  Past Surgical History:   Procedure Laterality Date    ANKLE SURGERY Left     COLONOSCOPY       FAMILY HISTORY & SOCIAL HISTORY:  Family history non-contributory  Family History   Problem Relation Age of Onset    Stroke Mother     Cancer Father      Social History     Tobacco Use    Smoking status: Current Every Day Smoker     Packs/day: 1.00     Years: 30.00     Pack years: 30.00     Types: Cigarettes    Smokeless tobacco: Never Used    Tobacco comment: pt states she has chantix at home hasnt started yet   Vaping Use    Vaping Use: Not on file   Substance Use Topics    Alcohol use: Yes     Alcohol/week: 2.0 - 4.0 standard drinks     Types: 2 - 4 Shots of liquor per week     Comment: vodka daily    Drug use: No         Allergies & Outpatient Medications   ALLERGIES:  No Known Allergies  HOME MEDICATIONS:  Current Discharge Medication List      CONTINUE these medications which have NOT CHANGED    Details   cyclobenzaprine (FLEXERIL) 10 MG tablet TAKE 1 TABLET BY MOUTH THREE TIMES DAILY      diclofenac (CATAFLAM) 50 MG tablet TAKE 1 TABLET BY MOUTH TWICE DAILY      vitamin B-1 (THIAMINE) 100 MG tablet Take 1 tablet by mouth daily for 7 days  Qty: 7 tablet, Refills: 0      atenolol (TENORMIN) 100 MG tablet TK 1 T PO D      Hydroquinone 4 % EMUL bid  Qty: 1 Bottle, Refills: 2      atorvastatin (LIPITOR) 40 MG tablet TAKE 1 TABLET EVERY DAY  Qty: 90 tablet, Refills: 0    Associated Diagnoses: Hyperlipidemia      ergocalciferol (DRISDOL) 49787 UNITS capsule Take 1 capsule by mouth once a week. Qty: 4 capsule, Refills: 2    Associated Diagnoses: Vitamin D deficiency               Physical Exam   PHYSICAL EXAM:  Vitals:    05/10/22 0500 05/10/22 0530 05/10/22 0600 05/10/22 0630   BP: 138/70 (!) 170/99 (!) 160/88 (!) 157/91   Pulse: 76 74 74 76   Resp: 19 20 19 20   Temp:       TempSrc:       SpO2: 94% 92% 91% 91%   Weight:       Height:             General: Alert, no distress, well-nourished  Neurologic  Mental status: Confused. Says it's July 19, 2018. Some difficulty with repeating and reading, mild paraphasic errors. Able to name objects.  Follows simple commands       Cranial nerves:   CN2: Visual fields full w/o extinction on confrontational testing   CN 3,4,6: Pupils equal and reactive to light, extraocular muscles intact  CN5: Facial sensation symmetric   CN7: Mild right nasolabial flattening on activation   CN8: Hearing symmetric to spoken voice  CN9: Palate elevated symmetrically  CN11: Traps full strength on shoulder shrug  CN12: Tongue midline with protrusion    Motor Exam:   R  L    Deltoid 5 5   Biceps 5 5   Triceps 5 5   Interossei 5 5      R  L    Hip flexion  5 5   Knee flexion  5 5   Knee extension  5 5   Ankle dorsiflexion  5 5   Ankle plantar flexion  5 5       Sensory: light touch intact and symmetric in all 4 extremities. No sensory extinction on bilateral simultaneous stimulation  Cerebellar/coordination: finger nose finger normal without ataxia  Tone: normal in all 4 extremities      OTHER SYSTEMS:  Cardiovascular: Warm, appears well perfused   Respiratory: Easy, non-labored respiratory pattern   Abdominal: Abdomen is without distention   Extremities: Upper and lower extremities are atraumatic in appearance without deformity. No swelling or erythema. Diagnostic Testing Results   IMAGES:  Images personally reviewed and agree w/ radiology interpretation. Head CT w/o Contrast:  1. Stable appearance of intra-axial left frontal lobe and left temporal lobe hemorrhage with vasogenic edema. 2. Patient status post seizure which may account for hemorrhage, posttraumatic, however, Magnetic resonance imaging without and with contrast is suggested to exclude underlying occult pathology. LABS:  All results below personally reviewed. Pertinent positives & negatives are addressed in Impression & Recommendations below.      LABS   Metabolic Panel Recent Labs     05/09/22  1910      K 4.9   CL 98*   CO2 17*   BUN 12   CREATININE 0.9   GLUCOSE 115*   CALCIUM 9.8   LABALBU 4.4   ALKPHOS 96   ALT 28   AST 52*      CBC / Coags Recent Labs     05/09/22  1815   WBC 8.3   RBC 4.76   HGB 16.8*   HCT 52.3*      INR 0.98      Other No results for input(s): LABA1C, LDLCALC, TRIG, TSH, [id number], FOLATE, LABSALI, COVID19 in the last 72 hours. Recent Labs     05/09/22  1815   LACTSEPSIS 0.8          CURRENT SCHEDULED MEDICATIONS   Inpatient Medications     atorvastatin, 40 mg, Oral, Daily    levetiracetam, 500 mg, IntraVENous, Q12H    dexamethasone, 4 mg, Oral, 4 times per day    pantoprazole, 40 mg, IntraVENous, Daily   Infusions    dextrose        Antibiotics   Recent Abx Admin      No antibiotic orders with administrations found. IMPRESSION & RECOMMENDATIONS     IMPRESSION:  ASSESSMENT: 60year old woman with PMH HTN, HLD who presented from home after being found down and was found to have a left temporal lobe and left frontal lobe hemorrhage that is suspicious for underlying tumor given its appearance. I do thing she had a GTC as she is amnestic to the event and the left temporal lobe is an area of high epileptigenicity. Seizure Semiology: Generalized tonic clonic with tongue biting, post-ictal confusion. ICH SCORE:  Component Criteria Points   GCS 3-4 (2)  5-12 (1)  13-15 (0) 0   ICH Volume ? 30 ml (1)  <30 ml (0) 0   Intraventricular Hemorrhage Yes (1)  No (0) 0   Infratentorial Origin Yes (1)  No (0) 1   Age ? 80 (1)  <80 (0) 0   TOTALS POINTS:  1       RECOMMENDATIONS:   NEURO EXAMS:   Neurologic Exams Q1H   NIHSS on admission & Qshift    DIAGNOSTIC IMAGING   Head CT  o Repeat Head CT in 6 hours for stability    MRI of brain w/ & w/o when able    ICU MANAGEMENT   Airway Management  o Supplemental O2 to maintain SaO2 > 95%  o ntubate for respiratory distress, GCS < 8, inability to protect airway  - Prefer RSI Protocol for intubation  - Titrate ventilator to maintain PaO2 >100 mm Hg  - Keep PaCO2 Normalized     Hemodynamic management  o Blood Pressure Control - SBP ? 160 mmHg  - If PRN or Infusion medications required for lowering, recommend goal SBP range 140-160 to prevent relative hypoperfusion and preserve an adequate cerebral perfusion pressure  - Resumed home atenolol 100mg PO daily  - May require second oral agent, consider hydrochlorothiazide     o Goal to have blood pressure lowered to desired range within 1 hours of presentation  - IV Intermittent dose: Labetalol 10-20mg  - IV continuous infusion: Nicardipine 2.5mg  20mg, titrate Q5 minutes to desired effect     Seizure prophylaxis  - If indicated Keppra 1gm BID (cortical / temporal ICH)     DVT Prophylaxis  o SCDs bilateral Lower Extremities   o 24 hours after stable head CT may start DVT chemoprophylaxis   - Preferred Heparin 5000 units SQ TID   - Perform screening lower extremity dopplers ultrasounds if patient has profound hemiplegia or unable to mobilize within first 7 days of admission or when indicated for symptomatic DVT.  General Care Issues  o Glucose:  Initiate treatment for hyperglycemia. Goal glucose 110-180 mg/dL. o Sodium:  Maintain in normal range (135  146 [place]/L). o Magnesium: Maintain > 1.8 mg/dL. o Heme: Keep Plts ? 100K, Keep INR ? 1.4  o Temperature: Goal is normothermia. Culture for fever > 101.5 F  - Elevated temperature can greatly affect mental status and increases metabolic demand, potentially worsening outcomes if left untreated. Treat fever aggressively   o Nutrition: address within first 24  48 hours after admission.  Keep NPO while awaiting imaging and neurosurgical plan  o PT/OT/SLP & PMR consult as indicated        [facility] Center[facility]ACQUIE - CNP   Neurology & Neurocritical Care   Neurology Line: [phone]  [employer]: [facility] Neuro Critical [facility] NPs  5/10/2022 8:55 AM

## 2022-05-10 NOTE — PROGRESS NOTES
Physical Therapy  Facility/Department: [facility] ICU  Physical Therapy Initial Assessment / treatment    Name: Viola Briceno  : 1965  MRN: [id number]  Date of Service: 5/10/2022    Discharge Recommendations: Viola Briceno scored a 18/24 on the AM-PAC short mobility form. Current research shows that an AM-PAC score of 18 or greater is typically associated with a discharge to the patient's home setting. Based on the patient's AM-PAC score and their current functional mobility deficits, it is recommended that the patient have 2-3 sessions per week of Physical Therapy at d/c to increase the patient's independence. At this time, this patient demonstrates the endurance and safety to discharge home with home services and a follow up treatment frequency of 2-3x/wk. Please see assessment section for further patient specific details. If patient discharges prior to next session this note will serve as a discharge summary. Please see below for the latest assessment towards goals. PT Equipment Recommendations  Equipment Needed: No      Patient Diagnosis(es): There were no encounter diagnoses. Past Medical History:  has a past medical history of Hyperlipidemia, Hypertension, ICH (intracerebral hemorrhage), and Vitamin D deficiency. Past Surgical History:  has a past surgical history that includes Ankle surgery (Left) and Colonoscopy. Assessment   Body Structures, Functions, Activity Limitations Requiring Skilled Therapeutic Intervention: Decreased functional mobility ; Decreased safe awareness;Decreased balance  Assessment: Pt is 60 y.o. female admit with newly diagnosed L frontal mass and intraparenchymal hemorrhage. Pt is from home with  and typically very indep including working and driving. Today, pt requires CGA to min A for safety with functional transfers and amb. Pt demos decreased insight, impulsivity, is distracted at times, and decreased safety awareness.   Pt is below her functional baseline. Rec 24hr assist and home PT. Will follow  Treatment Diagnosis: impaired gait and transfers, decreased balance  Therapy Prognosis: Good  Decision Making: High Complexity  Requires PT Follow-Up: Yes  Activity Tolerance  Activity Tolerance Comments: Pt found with O2 turned on but not in place. O2 sats 93%. Pt left on RA with O2 sats 92-93%. RN aware. Plan   Plan  Plan: 5-7 times per week  Current Treatment Recommendations: Strengthening,Balance training,Functional mobility training,Transfer training,Patient/Caregiver education & training,Therapeutic activities,Gait training  Safety Devices  Type of Devices: Bed alarm in place,Call light within reach,Gait belt,Nurse notified,Left in bed     Restrictions  Position Activity Restriction  Other position/activity restrictions: up with assist     Subjective   General  Chart Reviewed: Yes  Additional Pertinent Hx: Admit 5/10 to ICU from OSH after being found down at home, (+) seizure; head CT: (+) mass L frontal lobe, (+) intraparenchymal hemorrhage L temoporal lobe; neuro and neurosurg consulted; brain MRI pending; PMHx: HLD, HTN  Referring Practitioner: Lani Bear MD  Diagnosis: intraparenchymal hemorrhage, L frontal lobe mass  Subjective  Subjective: Pt found supine in bed. Alert, pleasant, and agrees to PT.  and sister present. Pt denies pain.          Social/Functional History  Social/Functional History  Lives With: Spouse  Type of Home: House  Home Layout: Two level,Bed/Bath upstairs,1/2 bath on main level  Home Access: Stairs to enter with rails  Entrance Stairs - Number of Steps: 5-6 x2 bilevel home, no steps from outside  Bathroom Shower/Tub: Tub/Shower unit  [employer]: Handicap height  Bathroom Equipment:  (none)  Home Equipment: Walker, standard  Has the patient had two or more falls in the past year or any fall with injury in the past year?: Yes (reporting falls in the past year 2/2 vestibular balance issues but becoming more IND in the last 6 months)  ADL Assistance: Independent  Homemaking Assistance: Independent  Ambulation Assistance: Independent  Transfer Assistance: Independent  Active : Yes  Occupation: Full time employment  Additional Comments: Pt family assisting with PLOF pt reporting inconsistent information family correcting at times. Pt has had falls in the past - received PT at OP balance disorder clinic. Vision/Hearing  Vision Exceptions: Wears glasses at all times (glasses falling off at times during session)  Hearing: Within functional limits    Cognition   Orientation  Orientation Level: Oriented to place;Oriented to time;Oriented to person  Cognition  Overall Cognitive Status: Exceptions  Arousal/Alertness: Appropriate responses to stimuli  Following Commands: Follows one step commands with repetition  Attention Span: Difficulty dividing attention; Difficulty attending to directions  Memory: Decreased recall of recent events;Decreased short term memory  Safety Judgement: Decreased awareness of need for safety  Problem Solving: Assistance required to generate solutions;Assistance required to implement solutions;Decreased awareness of errors  Insights: Decreased awareness of deficits  Initiation: Does not require cues  Sequencing: Requires cues for some     Objective               AROM RLE (degrees)  RLE AROM: WFL  AROM LLE (degrees)  LLE AROM : WFL  Strength RLE  Strength RLE: WFL  Strength LLE  Strength LLE: WFL  Strength Other  Other: B LE heel to shin coordination intact           Bed mobility  Supine to Sit: Stand by assistance (HOB elevated)  Sit to Supine: Stand by assistance (HOB flat)  Scooting: Stand by assistance  Transfers  Sit to Stand: Contact guard assistance (from EOB, chair, toilet)  Stand to sit: Contact guard assistance (cues for safety, impulsive at times)  Ambulation  Device: No Device  Other Apparatus:  (rodriguez catheter)  Assistance: Minimal assistance  Quality of Gait: appropriate russell, impulsive with turns, increased lateral sway at times, unsteady, decreased safety  Distance: 2 ft; 10 ft; 30 ft     Balance  Sitting - Static: Fair (SBA)  Sitting - Dynamic: Fair (CGA to min A due to increased lateral sway)  Standing - Static: Fair (CGA while standing at sink)  Standing - Dynamic: Fair (min A during amb)           OutComes Score                                                  AM-PAC Score  AM-PAC Inpatient Mobility Raw Score : 18 (05/10/22 1456)  AM-PAC Inpatient T-Scale Score : 43.63 (05/10/22 1456)  Mobility Inpatient CMS 0-100% Score: 46.58 (05/10/22 1456)  Mobility Inpatient CMS G-Code Modifier : CK (05/10/22 1456)          Goals  Long Term Goals  Time Frame for Long term goals : discharge  Long term goal 1: Pt will transfer supine <--> sit with supervision  Long term goal 2: Pt will transfer sit <--> stand with supervision  Long term goal 3: Pt will amb 150 ft with supervision  Long term goal 4: Pt will negotiate 5 steps with rail and CGA  Patient Goals   Patient goals : return home with family       Therapy Time   Individual Concurrent Group Co-treatment   Time In 1400         Time Out 1440         Minutes 40                 Pt was educated on role of PT, use of call light; pt demos partial understanding and will benefit from reinforcement. Family educated on role of PT and d/c recs - verbalized good understanding. Timed Code Treatment Minutes:  25    Total Treatment Minutes:  40    If patient is discharged prior to next treatment, this note will serve as the discharge summary.   Maria Teresa Jackson, PT, DPT  [id number]

## 2022-05-10 NOTE — CARE COORDINATION
Case Management Assessment           Initial Evaluation                Date / Time of Evaluation: 5/10/2022 9:47 AM                 Assessment Completed by: Kwesi Caraballo RN    Patient Name: Gasper Adamson     YOB: 1965  Diagnosis: Intraparenchymal hemorrhage of brain [facility]) [I61.9]     Date / Time: 5/10/2022  2:44 AM    Patient Admission Status: Inpatient    Current PCP: Cesar Umanzor MD    Chart Reviewed: Yes    Emergency Contacts:  Extended Emergency Contact Information  Primary Emergency Contact: [facility] Phone: [phone]  Relation: Brother/Sister  Secondary Emergency Contact: [facility] [place][note 2] Phone: [phone]  Relation: Spouse    Advance Directives:   Code Status: Full Code    Financial  Payor: Casandra Najera / Plan: Misha / Product Type: *No Product type* /     Mukesh Streeter [phone] - F [phone]  [address] [facility] [address]  Phone: [phone] Fax: [phone]    Armani Woodruff #[id number] - [id number], [address] [facility] [address] [facility] [phone]  Lola Peng [id number] [facility] [address]  Phone: [phone] Fax: [phone]      Potential assistance Purchasing Medications:    Does Patient want to participate in local refill/ meds to beds program?: Yes    Meds To Beds General Rules:  1. Can ONLY be done Monday- Friday between 8:30am-5pm  2. Prescription(s) must be in pharmacy by 3pm to be filled same day  3. Copy of patient's insurance/ prescription drug card and patient face sheet must be sent along with the prescription(s)  4. Cost of Rx cannot be added to hospital bill. If financial assistance is needed, please contact unit  or ;  or  CANNOT provide pharmacy voucher for patients co-pays  5.  Patients can then  the prescription on their way out of the hospital at discharge, or pharmacy can deliver to the bedside if staff is available. (payment due at time of pick-up or delivery - cash, check, or card accepted)     Able to afford home medications/ co-pay costs: Yes    ADLS Independent with self care and functional mobility prior to admission   Support Systems:  Family    [place]: multiple level house with  in [place]  Steps: 1 SUE    Plans to RETURN to current housing: Yes    Arnaldo Brady  Currently ACTIVE with [address]: No    Durable Medical Equipment  Equipment: none    DISCHARGE PLAN:  Disposition: TBD. Anticipate a discharge home. Will reassess service needs    Transportation PLAN for discharge: family     The Patient and/or patient representative Joann Pelaez and her family were provided with a choice of provider and agrees with the discharge plan Yes    Freedom of choice list was provided with basic dialogue that supports the patient's individualized plan of care/goals and shares the quality data associated with the providers.  Yes      Cici Ulloa RN  [facility]  Case Management Department  [phone]

## 2022-05-10 NOTE — PLAN OF CARE
Problem: Safety - Medical Restraint  Goal: Remains free of injury from restraints (Restraint for Interference with Medical Device)  Description: INTERVENTIONS:  1. Determine that other, less restrictive measures have been tried or would not be effective before applying the restraint  2. Evaluate the patient's condition at the time of restraint application  3. Inform patient/family regarding the reason for restraint  4.  Q2H: Monitor safety, psychosocial status, comfort, nutrition and hydration  Outcome: Completed     Problem: Discharge Planning  Goal: Discharge to home or other facility with appropriate resources  Outcome: Progressing     Problem: Safety - Adult  Goal: Free from fall injury  5/10/2022 0549 by Toan Coulter RN  Outcome: Progressing  5/10/2022 0402 by Toan Coulter RN  Outcome: Progressing     Problem: ABCDS Injury Assessment  Goal: Absence of physical injury  5/10/2022 0549 by Toan Coulter RN  Outcome: Progressing  5/10/2022 0402 by Toan Coulter RN  Outcome: Progressing

## 2022-05-10 NOTE — PROGRESS NOTES
Clinical Pharmacy Consult Note    60 y.o. female admitted with intraparenchymal hemorrhage. Pharmacy has been asked by Dr. Rocky Childers to adjust all drips to normal saline as appropriate based on compatibility to avoid fluid shifts since D5 is osmotically active. The following intermittent IV drips/infusions have been adjusted to saline:  Keppra (already in NS)    The following medications must remain in D5W due to incompatibility with normal saline:  Amphotericin  Mycophenolate  Nitroprusside  Penicillin G Potassium    Please be aware that patient has D5W ordered as part of hypoglycemia orderset. [facility] ED will follow daily to ensure all new IVPBs + drips are in NS. Please call with questions.   Enrique Uribe, PharmD  PGY-1 Pharmacy Resident  [id number]  5/10/2022 10:35 AM

## 2022-05-10 NOTE — PROGRESS NOTES
4 Eyes Admission Assessment     I agree as the admission nurse that 2 RN's have performed a thorough Head to Toe Skin Assessment on the patient. ALL assessment sites listed below have been assessed on admission. Areas assessed by both nurses:   [x]   Head, Face, and Ears   [x]   Shoulders, Back, and Chest  [x]   Arms, Elbows, and Hands   [x]   Coccyx, Sacrum, and Ischum  [x]   Legs, Feet, and Heels        Does the Patient have Skin Breakdown?   No         Mohan Prevention initiated:  NA   Wound Care Orders initiated:  NA      WOC nurse consulted for Pressure Injury (Stage 3,4, Unstageable, DTI, NWPT, and Complex wounds):  NA      Nurse 1 eSignature: Electronically signed by Markus Figueredo RN on 5/10/22 at 4:49 AM EDT    Nurse 2 eSignature: Electronically signed by Virginie Rust RN on 5/10/22 at 6:52 AM EDT

## 2022-05-11 LAB
ANION GAP SERPL CALCULATED.3IONS-SCNC: 20 MMOL/L (ref 3–16)
BASOPHILS ABSOLUTE: 0 K/UL (ref 0–0.2)
BASOPHILS RELATIVE PERCENT: 0.4 %
BUN BLDV-MCNC: 12 MG/DL (ref 7–20)
CALCIUM SERPL-MCNC: 10.3 MG/DL (ref 8.3–10.6)
CHLORIDE BLD-SCNC: 99 MMOL/L (ref 99–110)
CO2: 16 MMOL/L (ref 21–32)
CREAT SERPL-MCNC: 0.7 MG/DL (ref 0.6–1.1)
EOSINOPHILS ABSOLUTE: 0 K/UL (ref 0–0.6)
EOSINOPHILS RELATIVE PERCENT: 0 %
GFR AFRICAN AMERICAN: >60
GFR NON-AFRICAN AMERICAN: >60
GLUCOSE BLD-MCNC: 106 MG/DL (ref 70–99)
GLUCOSE BLD-MCNC: 108 MG/DL (ref 70–99)
GLUCOSE BLD-MCNC: 108 MG/DL (ref 70–99)
HCT VFR BLD CALC: 50.9 % (ref 36–48)
HEMOGLOBIN: 17.1 G/DL (ref 12–16)
LV EF: 58 %
LVEF MODALITY: NORMAL
LYMPHOCYTES ABSOLUTE: 0.5 K/UL (ref 1–5.1)
LYMPHOCYTES RELATIVE PERCENT: 7.4 %
MAGNESIUM: 1.9 MG/DL (ref 1.8–2.4)
MCH RBC QN AUTO: 36 PG (ref 26–34)
MCHC RBC AUTO-ENTMCNC: 33.6 G/DL (ref 31–36)
MCV RBC AUTO: 107.2 FL (ref 80–100)
MONOCYTES ABSOLUTE: 0.1 K/UL (ref 0–1.3)
MONOCYTES RELATIVE PERCENT: 2.1 %
NEUTROPHILS ABSOLUTE: 6.3 K/UL (ref 1.7–7.7)
NEUTROPHILS RELATIVE PERCENT: 90.1 %
PDW BLD-RTO: 14.2 % (ref 12.4–15.4)
PERFORMED ON: ABNORMAL
PERFORMED ON: ABNORMAL
PLATELET # BLD: 171 K/UL (ref 135–450)
PMV BLD AUTO: 8.7 FL (ref 5–10.5)
POTASSIUM SERPL-SCNC: 4.3 MMOL/L (ref 3.5–5.1)
RBC # BLD: 4.75 M/UL (ref 4–5.2)
SODIUM BLD-SCNC: 135 MMOL/L (ref 136–145)
WBC # BLD: 7 K/UL (ref 4–11)

## 2022-05-11 PROCEDURE — 6370000000 HC RX 637 (ALT 250 FOR IP): Performed by: INTERNAL MEDICINE

## 2022-05-11 PROCEDURE — 6360000002 HC RX W HCPCS: Performed by: STUDENT IN AN ORGANIZED HEALTH CARE EDUCATION/TRAINING PROGRAM

## 2022-05-11 PROCEDURE — 36415 COLL VENOUS BLD VENIPUNCTURE: CPT

## 2022-05-11 PROCEDURE — 6360000002 HC RX W HCPCS: Performed by: PHYSICIAN ASSISTANT

## 2022-05-11 PROCEDURE — 2060000000 HC ICU INTERMEDIATE R&B

## 2022-05-11 PROCEDURE — 92523 SPEECH SOUND LANG COMPREHEN: CPT

## 2022-05-11 PROCEDURE — 99233 SBSQ HOSP IP/OBS HIGH 50: CPT | Performed by: INTERNAL MEDICINE

## 2022-05-11 PROCEDURE — 2580000003 HC RX 258: Performed by: NURSE PRACTITIONER

## 2022-05-11 PROCEDURE — 6360000004 HC RX CONTRAST MEDICATION: Performed by: STUDENT IN AN ORGANIZED HEALTH CARE EDUCATION/TRAINING PROGRAM

## 2022-05-11 PROCEDURE — 83735 ASSAY OF MAGNESIUM: CPT

## 2022-05-11 PROCEDURE — 97129 THER IVNTJ 1ST 15 MIN: CPT

## 2022-05-11 PROCEDURE — 6370000000 HC RX 637 (ALT 250 FOR IP): Performed by: STUDENT IN AN ORGANIZED HEALTH CARE EDUCATION/TRAINING PROGRAM

## 2022-05-11 PROCEDURE — 6370000000 HC RX 637 (ALT 250 FOR IP): Performed by: NURSE PRACTITIONER

## 2022-05-11 PROCEDURE — 93306 TTE W/DOPPLER COMPLETE: CPT

## 2022-05-11 PROCEDURE — 6360000002 HC RX W HCPCS: Performed by: NURSE PRACTITIONER

## 2022-05-11 PROCEDURE — 1200000000 HC SEMI PRIVATE

## 2022-05-11 PROCEDURE — C9113 INJ PANTOPRAZOLE SODIUM, VIA: HCPCS | Performed by: NURSE PRACTITIONER

## 2022-05-11 PROCEDURE — 80048 BASIC METABOLIC PNL TOTAL CA: CPT

## 2022-05-11 PROCEDURE — 2580000003 HC RX 258: Performed by: PHYSICIAN ASSISTANT

## 2022-05-11 PROCEDURE — 85025 COMPLETE CBC W/AUTO DIFF WBC: CPT

## 2022-05-11 PROCEDURE — 2500000003 HC RX 250 WO HCPCS: Performed by: STUDENT IN AN ORGANIZED HEALTH CARE EDUCATION/TRAINING PROGRAM

## 2022-05-11 PROCEDURE — 83036 HEMOGLOBIN GLYCOSYLATED A1C: CPT

## 2022-05-11 PROCEDURE — 94761 N-INVAS EAR/PLS OXIMETRY MLT: CPT

## 2022-05-11 PROCEDURE — A9576 INJ PROHANCE MULTIPACK: HCPCS | Performed by: STUDENT IN AN ORGANIZED HEALTH CARE EDUCATION/TRAINING PROGRAM

## 2022-05-11 RX ORDER — AMLODIPINE BESYLATE 5 MG/1
5 TABLET ORAL DAILY
Status: DISCONTINUED | OUTPATIENT
Start: 2022-05-11 | End: 2022-05-12 | Stop reason: HOSPADM

## 2022-05-11 RX ORDER — HYDRALAZINE HYDROCHLORIDE 20 MG/ML
10 INJECTION INTRAMUSCULAR; INTRAVENOUS ONCE
Status: COMPLETED | OUTPATIENT
Start: 2022-05-11 | End: 2022-05-11

## 2022-05-11 RX ADMIN — DEXAMETHASONE 4 MG: 4 TABLET ORAL at 17:58

## 2022-05-11 RX ADMIN — LABETALOL HYDROCHLORIDE 10 MG: 5 INJECTION, SOLUTION INTRAVENOUS at 00:41

## 2022-05-11 RX ADMIN — LEVETIRACETAM 1000 MG: 100 INJECTION, SOLUTION, CONCENTRATE INTRAVENOUS at 08:36

## 2022-05-11 RX ADMIN — HYDRALAZINE HYDROCHLORIDE 10 MG: 20 INJECTION INTRAMUSCULAR; INTRAVENOUS at 04:20

## 2022-05-11 RX ADMIN — ATENOLOL 100 MG: 50 TABLET ORAL at 08:33

## 2022-05-11 RX ADMIN — DEXAMETHASONE 4 MG: 4 TABLET ORAL at 23:21

## 2022-05-11 RX ADMIN — LEVETIRACETAM 1000 MG: 100 INJECTION, SOLUTION, CONCENTRATE INTRAVENOUS at 23:05

## 2022-05-11 RX ADMIN — GADOTERIDOL 15 ML: 279.3 INJECTION, SOLUTION INTRAVENOUS at 00:16

## 2022-05-11 RX ADMIN — DEXAMETHASONE 4 MG: 4 TABLET ORAL at 14:08

## 2022-05-11 RX ADMIN — AMLODIPINE BESYLATE 5 MG: 5 TABLET ORAL at 08:44

## 2022-05-11 RX ADMIN — DEXAMETHASONE 4 MG: 4 TABLET ORAL at 06:08

## 2022-05-11 RX ADMIN — PANTOPRAZOLE SODIUM 40 MG: 40 INJECTION, POWDER, FOR SOLUTION INTRAVENOUS at 08:33

## 2022-05-11 RX ADMIN — ATORVASTATIN CALCIUM 40 MG: 40 TABLET, FILM COATED ORAL at 08:33

## 2022-05-11 ASSESSMENT — ENCOUNTER SYMPTOMS
NAUSEA: 0
DIARRHEA: 0
COUGH: 0
ABDOMINAL DISTENTION: 0
ABDOMINAL PAIN: 0
SHORTNESS OF BREATH: 0
CONSTIPATION: 0

## 2022-05-11 ASSESSMENT — PAIN SCALES - GENERAL
PAINLEVEL_OUTOF10: 0

## 2022-05-11 NOTE — PROGRESS NOTES
Discussed case on morning rounds with Dr. Hortensia Perrin. 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 Rather she would be a better candidate for EBUS with biopsy given mediastinal disease involvement.  Will update Neurosurgery team

## 2022-05-11 NOTE — PROGRESS NOTES
Speech Language Pathology  Facility/Department: [facility] ICU  Initial Speech/Language/Cognitive Assessment/treat    NAME: Zora Bean  : 1965   MRN: [id number]  ADMISSION DATE: 5/10/2022  ADMITTING DIAGNOSIS: has HTN (hypertension), benign; Vitamin D deficiency; Vertigo; Dyslipidemia; and Intraparenchymal hemorrhage of brain [facility]) on their problem list.  DATE ONSET: 5/10/22    Date of Eval: 2022   Evaluating Therapist: Norberto Villalobos, SLP    MRI 22  1. The superior left frontal lesion demonstrates significant enhancement compatible with a hemorrhagic metastasis. 2. Motion artifact severely compresses evaluation there appears to be additional enhancement along the left temporal lesion suspicious for metastasis. 3. Additional FLAIR signal abnormalities with faint areas of enhancement in the left frontal lobe, right frontal lobe and in the left cerebellum are suspicious for additional metastasis. Follow-up MRI when the patient is better able to comply with motion    constraints would be recommended. 4. No significant progression in intracranial hemorrhage compared to prior exam.       Primary Complaint: pt denies any speech or cognitive issues    Pain:  Pain Assessment: None - Denies Pain    Assessment:  Pt demonstrating mod cognitive/linguistic impairment. The MOCA cognitive screen was completed with pt scoring 23/30 (normal 26>) impairments were in the areas of reduced visuo spatial/executive function, decreased attention, decreased short term memory and decreased awareness/insight into deficits. Pt denies any problems after evaluation completed. In very matter of fact statement, pt explained what the doctors have told her (affect flat, no emotion).   Pt denies need for additional therapy but agreeable to recommendations    Recommendations:  Requires SLP Intervention: Yes  D/C Recommendations: Ongoing speech therapy is recommended during this hospitalization     Plan:   Goals:  Pt to be seen 2-4 x/wk to address the following goals:  1- Pt will complete divided/alternating attention tasks with >70 % accuracy. 2- Pt will complete short-term memory tasks, of increasing length and complexity, with 50 % accuracy or with mod cues. 3- Patient will complete verbal problem solving tasks with > 80 % accuracy. 4- Patient will complete functional math activities with > 80 % accuracy. 5-Patient will demonstrate insight into limitations and impact on daily functioning with mod cues. 5/11:  Educated pt /sister to results of evaluation and goals. Explained specific impairments, and how they may impact daily function. Pt denies a problem. Cont goal    Patient/family involved in developing goals and treatment plan: yes    Subjective:  Previous level of function and limitations: independent  General  Chart Reviewed: Yes  Family / Caregiver Present: Yes  Additional Family/ Caregiver information: sister present  Social/Functional History  Lives With: Spouse  Type of Home: House  Ambulation Assistance: Independent  Mode of Transportation: Car  Occupation: Full time employment  Type of Occupation: works for [address] [place] [address] [employer]: Impaired  Vision Exceptions: Wears glasses at all times  Hearing  Hearing: Within functional limits     Objective: Auditory Comprehension  Comprehension: Exceptions  Two Step Commands: Mild- mod    Reading Comprehension  Reading Status: Within functional limits at sentence level, will cont assessment for material of increased complexity and comprehension    Verbal Expression  Verbal Expression: Within functional limits - no word finding or paraphasias noted. Written Expression  Dominant Hand: Right  Pt wrote name and edwin face of clock accurately.  Will cont to assess during treatment sessions    Cognition:     Overall Orientation Status: Within Functional Limits  Attention  Attention: Exceptions to [facility]  Alternating Attention: mod  Divided Attention: Mod  Sustained Attention: Mod  Memory  Memory: Exceptions to [facility]  Short-term Memory: Moderate  Working Memory: Moderate  Problem Solving  Problem Solving: mild-mod - decreased flexibility of thought  Numeric Reasoning  Numeric Reasoning: Exceptions to [facility]   Calculations/money/time concepts: Mild- mod  Abstract Reasoning  Abstract Reasoning: Exceptions to [facility]  Convergent Thinking: Mild- mod  Safety/Judgment  Safety/Judgment: Exceptions to [facility]  Insight: Mod  Impulsive: Mild- mod    Prognosis:  Speech Therapy Prognosis  Prognosis: Fair  Prognosis Considerations: Co-Morbidities  Individuals consulted  Consulted and agree with results and recommendations: Patient;RN  RN Name: Rosalino Marmolejo    Education:  Patient Education: Pt and sister educated to purpose of evaluation  Patient Education Response: Verbalizes understanding  Safety Devices in place: Yes  Type of devices: Call light within reach    Therapy Time:   Individual Concurrent Group Co-treatment   Time In 1242         Time Out 1310         Minutes 28              Plan:  Cognitive/linguisitic tx 2-4 x/week  Dc recommendation: ongoing treatment indicated  tx goal; denies any problem  Dc goal: I want to go home tomorrow after the bronchoscopy  Fabian Mcallister M.S./[facility]SLP #[id number]  Pg.  # [phone]  Needs met prior to leaving room, call light within reach, pt sister in room  This document will serve as a dc summary if pt dc prior to next visit      5/11/2022 1:23 PM

## 2022-05-11 NOTE — PLAN OF CARE
Problem: SLP Adult - Impaired Communication  Goal: By Discharge: Demonstrates communication skills at highest level of function for planned discharge setting. See evaluation for individualized goals.   Outcome: Progressing

## 2022-05-11 NOTE — PROGRESS NOTES
Royal Gandara NP from neurosurgery at bedside and talking with pt in regards to what the findings of her CT. Royal Gandara explaining about going to IR for a lung biopsy. Will continue to monitor.

## 2022-05-11 NOTE — PLAN OF CARE
Problem: Discharge Planning  Goal: Discharge to home or other facility with appropriate resources  Outcome: Progressing     Problem: Safety - Adult  Goal: Free from fall injury  Outcome: Progressing     Problem: ABCDS Injury Assessment  Goal: Absence of physical injury  Outcome: Progressing     Problem: Neurosensory - Adult  Goal: Achieves stable or improved neurological status  Outcome: Progressing  Flowsheets (Taken 5/10/2022 2000)  Achieves stable or improved neurological status:   Assess for and report changes in neurological status   Maintain blood pressure and fluid volume within ordered parameters to optimize cerebral perfusion and minimize risk of hemorrhage  Goal: Absence of seizures  Outcome: Progressing  Flowsheets (Taken 5/10/2022 2000)  Absence of seizures:   Monitor for seizure activity.   If seizure occurs, document type and location of movements and any associated apnea   Administer anticonvulsants as ordered   Diagnostic studies as ordered  Goal: Remains free of injury related to seizures activity  Outcome: Progressing  Flowsheets (Taken 5/10/2022 2000)  Remains free of injury related to seizure activity:   Monitor patient for seizure activity, document and report duration and description of seizure to Licensed Independent Practitioner   Maintain airway, patient safety  and administer oxygen as ordered   If seizure occurs, turn patient to side and suction secretions as needed  Goal: Achieves maximal functionality and self care  Outcome: Progressing  Flowsheets (Taken 5/10/2022 2000)  Achieves maximal functionality and self care: Encourage and assist patient to increase activity and self care with guidance from physical therapy/occupational therapy

## 2022-05-11 NOTE — PROGRESS NOTES
Pt returned to ICU post MRI. Pt was non-compliant with instruction regarding movement. Pt attempted to exit MRI table on numerous occasions and refused to keep arms and legs still. As a result MRI results are altered. Pt was pre-medicated with 0.5mg IV Ativan per MAR.

## 2022-05-11 NOTE — CONSULTS
Department of Radiation Oncology  Attending Consult Note      Reason for Consult:  Brain metastases  Requesting Physician: Dr. Juan Blancas:  Seizure, fall    History Obtained From:  patient, family member - sister    HISTORY OF PRESENT ILLNESS:      The patient is a 60 y.o. 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 She was brought to [facility], where it was thought she had a seizure, so was started on Keppra and Decadron. There, CT head showed intraparenchymal hemorrhage in the left temporal lobe as well as a 3 cm mass in the left frontal lobe with vasogenic edema. She was then transferred to [facility], where she underwent staging CT chest abdomen pelvis yesterday, notable for a spiculated left apical lung mass measuring 1.4 cm, and extensive left hilar and mediastinal adenopathy, concerning for a lung cancer. Her CT was otherwise unremarkable without evidence of distant disease. Today she had an MRI of the brain which is notable for significant motion artifact, and did show a superior left frontal hemorrhagic lesion, , a left temporal hemorrhagic metastasis, a third equivocal enhancing lesion in the left cerebellum with flair signal, and May 4 small focus of enhancement and FLAIR signal in the right frontal lobe. Again additional smaller metastases may be present but difficult to discern given the motion artifact. Neurosurgery was consulted, and did not recommend surgical intervention given her stability of symptoms, and multiple brain metastases. We were also consulted for consideration of radiation. Of note a bronchoscopy is being planned for tomorrow to get a pathologic diagnosis. Today, she is here with her sister. Her  will be in later today. She denies any recent neurologic changes, prior to her seizure.   Today she denies any headaches, dizziness, numbness tingling or weakness, changes in vision or hearing. Denies any history of seizures. Denies any dysphagia. Denies any confusion or word finding difficulties today, however she does have some of this on exam.    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 She did have an MRI brain on 3/12/2020, which was not notable for any intracranial mass lesions. No prior cancers. No hx of radiation.      Cancer Treatment History: None to date    Past Medical History:    Past Medical History:   Diagnosis Date    Hyperlipidemia     Hypertension     ICH (intracerebral hemorrhage) 05/10/2022    Vitamin D deficiency        Past Surgical History:    Past Surgical History:   Procedure Laterality Date    ANKLE SURGERY Left     COLONOSCOPY         Current Medications:      Current Facility-Administered Medications:     amLODIPine (NORVASC) tablet 5 mg, 5 mg, Oral, Daily, Juany Rodriguez MD, 5 mg at 05/11/22 0844    atorvastatin (LIPITOR) tablet 40 mg, 40 mg, Oral, Daily, Amarjit Funk MD, 40 mg at 05/11/22 0833    acetaminophen (TYLENOL) tablet 650 mg, 650 mg, Oral, Q4H PRN, German Knowles MD    ondansetron (ZOFRAN-ODT) disintegrating tablet 4 mg, 4 mg, Oral, Q8H PRN **OR** ondansetron (ZOFRAN) injection 4 mg, 4 mg, IntraVENous, Q6H PRN, German Knowles MD    glucose chewable tablet 16 g, 4 tablet, Oral, PRN, Amarjit Funk MD    dextrose bolus 10% 125 mL, 125 mL, IntraVENous, PRN **OR** dextrose bolus 10% 250 mL, 250 mL, IntraVENous, PRN, German Knowles MD    glucagon (rDNA) injection 1 mg, 1 mg, IntraMUSCular, PRN, Amarjit Funk MD    dextrose 5 % solution, 100 mL/hr, IntraVENous, PRN, Amarjit Funk MD    dexamethasone (DECADRON) tablet 4 mg, 4 mg, Oral, 4 times per day, German Knowles MD, 4 mg at 05/11/22 0608    labetalol (NORMODYNE;TRANDATE) injection 10 mg, 10 mg, IntraVENous, Q4H PRN, Amarjit Funk MD, 10 mg at 05/11/22 0041    pantoprazole (PROTONIX) injection 40 mg, 40 mg, IntraVENous, Daily, JACQUIE Brewer - NP, 40 mg at 05/11/22 0833    levETIRAcetam (KEPPRA) 1,000 mg in sodium chloride 0.9 % 100 mL IVPB, 1,000 mg, IntraVENous, Q12H, Efraín Stockton APRN - CNP, Stopped at 05/11/22 1030    atenolol (TENORMIN) tablet 100 mg, 100 mg, Oral, Daily, Katiana Griggs APRN - CNP, 100 mg at 05/11/22 2251    perflutren lipid microspheres (DEFINITY) injection 1.65 mg, 1.5 mL, IntraVENous, ONCE PRN, Dai Ramos MD    Allergies:  Review of patient's allergies indicates no known allergies. Social History:    Social History     Socioeconomic History    Marital status:      Spouse name: Essie Salcedo Number of children: Not on file    Years of education: Not on file    Highest education level: Not on file   Occupational History    Not on file   Tobacco Use    Smoking status: Current Every Day Smoker     Packs/day: 1.00     Years: 30.00     Pack years: 30.00     Types: Cigarettes    Smokeless tobacco: Never Used    Tobacco comment: pt states she has chantix at home hasnt started yet   Vaping Use    Vaping Use: Not on file   Substance and Sexual Activity    Alcohol use: Yes     Alcohol/week: 2.0 - 4.0 standard drinks     Types: 2 - 4 Shots of liquor per week     Comment: vodka daily    Drug use: No    Sexual activity: Yes     Partners: Male   Other Topics Concern    Not on file   Social History Narrative    Not on file     Social Determinants of Health     Financial Resource Strain:     Difficulty of Paying Living Expenses: Not on file   Food Insecurity:     Worried About Running Out of Food in the Last Year: Not on file    [place] of Food in the Last Year: Not on file   Transportation Needs:     Lack of Transportation (Medical): Not on file    Lack of Transportation (Non-Medical):  Not on file   Physical Activity:     Days of Exercise per Week: Not on file    Minutes of Exercise per Session: Not on file   Stress:     Feeling of Stress : Not on file   Social Connections:     Frequency of Communication with Friends and Family: Not on file    Frequency of Social Gatherings with Friends and Family: Not on file    Attends Gnosticist Services: Not on file    Active Member of [address] [place] [address] [employer] or Organizations: Not on file    Attends Club or Organization Meetings: Not on file    Marital Status: Not on file   Intimate Partner Violence:     Fear of Current or Ex-Partner: Not on file    Emotionally Abused: Not on file    Physically Abused: Not on file    Sexually Abused: Not on file   Housing Stability:     Unable to Pay for Housing in the Last Year: Not on file    Number of [place] in the Last Year: Not on file    Unstable Housing in the Last Year: Not on file       Family History:   Family History   Problem Relation Age of Onset    Stroke Mother     Cancer Father        REVIEW OF SYSTEMS:    As outlined in HPI and chart review, otherwise review of general, eyes, nose, throat, pulmonary, cardiac, GI, , musculoskeletal, neurological, and integumentary systems is negative. PHYSICAL EXAM:      Vitals:    Vitals:    05/11/22 1200   BP: (!) 154/94   Pulse: 75   Resp: 18   Temp:    SpO2:      ECOG Performance Status (ECOG Scale 0-4):  ECOG 1  GEN: NAD, pleasant, cooperative, answers most questions appropriately, although has some word finding difficulties. One episode of uncontrollable laughter out of context   NEURO: CN 2-12 intact bilaterally. Strength 5/5 in UE and LE bilaterally. Sensation intact. MMSE: Word recall 1/3 (2/3 w/ prompting). Alert and oriented to person, but not place (got w/ prompting), not date (Says May 11 2023 or May 23). Can spell word backwards. Names objects appropriately. DATA:    Radiology Review:  Reviewed her CT CAP and MRI brain images myself and reports. Summarized above.            IMPRESSION/RECOMMENDATIONS: Rosey Latham is a previously healthy 73-year-old long-term smoker who presented with seizure and loss of consciousness, found to have 2 large brain metastases and at least 2 smaller questionable brain metastases, as well as a lung mass with hilar and mediastinal adenopathy. Her clinical picture fits with a diagnosis of metastatic lung cancer with brain metastases, but no other obvious areas of metastases. Her neurologic symptoms have improved with steroids and Keppra, although she still does seem to have some slight expressive aphasia and word finding difficulties. I discussed with the niece and her sister her likely diagnosis of metastatic lung cancer with metastases to the brain. We are still awaiting pathologic confirmation, as she is scheduled for bronchoscopy tomorrow. I discussed with her treatment options for brain metastases, including radiosurgery versus whole brain radiation versus surgery. Or some combination of these. She was not recommended surgery by her neurosurgical team.  After further review of her scans (albiet MRI limited by motion), I think SRS is reasonable in her case in an otherwise mostly healthy patient with good performance status pre-admission. I discussed with them the rationale of each option of whole brain and SRS, the risks, benefits, acute and long-term side effects of each. We will await final pathology. I will discuss with the gamma knife team if she can be treated, likely with fractionated gamma knife given her larger lesions, hopefully next week. She should stay on dexamethasone and Keppra for now. If symptoms stabilize, then ok to drop Dex to 4mg TID until starting radiation. Thank you for inviting me to participate in Mrs. Blount's care.     Matthew Esquivel MD  [facility]  [phone]     ADDENDUM: 5/12/22 @ 8:40AM  I spoke with Azalea's , discussed her workup, likely diagnosis of metastatic lung cancer and my treatment recommendations for radiation to her brain metastases, once we get pathologic confirmation. I discussed with him treatment options of fractionated SRS (single fraction for small lesions, 3-5 fraction for large ones) vs whole brain radiation. After discussing risks and benefits of each, he and his wife are most interested in radiosurgery which I think is a very reasonable approach and I do recommend. We will try to get radiosurgery scheduled for Monday or Tuesday next week. She should remain on steroids (dex mg TID) and Keppra for now.      Glenn Harrison MD  [facility] Rad/Onc

## 2022-05-11 NOTE — PROGRESS NOTES
CC: Mediastinal lymphadenopathy    No respiratory symptoms. Denies headache or neurologic symptoms. Afebrile  WBC  /94. NSR  Heart sounds normal.  O2 saturation 95%, on room air. Breath sounds normal.  Chest CT scan reviewed. Spiculated mass in left upper lobe, with associated hilar and mediastinal lymphadenopathy. MRI brain shows abnormal areas suspicious for neoplastic lesions. Study is suboptimal because of motion artifact. A&P: Lung mass with mediastinal lymphadenopathy is most likely primary lesion, with metastatic disease to the brain. Given multiple lesions in the brain, surgery is not considered. Diagnosis should be accessible by bronchoscopy with EBUS to sample mediastinal and hilar lymph mark stations. Procedure is scheduled for Thursday, 5/12, at 0730. Bronchoscopy with general anesthesia procedure discussed with Mrs. Garcia Sanchez.   She agrees to proceed with this

## 2022-05-11 NOTE — PROGRESS NOTES
Hospitalist Progress Note      PCP: Violet Nur MD    Date of Admission: 5/10/2022    CC seizure. Hospital course  Patient is 68-year-old female with a history of hypertension, hyperlipidemia, active tobacco abuse presented to [facility] ER for syncope. Patient reports that last thing she remembers rewarming her dinner.  at bedside reports that yesterday around 2:30 PM he went outside patient was doing fine. He came home took a shower and almost 530 to 6 PM he noticed that patient was found down naked in the room appeared to have bitten her tongue. Was also frothing from mouth. CT head revealed intraparenchymal hemorrhage with apparent mass-effect. Patient did receive Keppra and Decadron transferred to [facility]..    CT chest abdomen and pelvis speculated ovoid nodule seen in the left lung apex suspicious for primary neoplasm. Extensive left hilar and mediastinal adenopathy. MRI brain with and without contrast.  Compatible with hemorrhagic metastasis. Subjective  Seen and examined today. Denies any nausea, vomiting, chills. No acute event reported overnight. Assessment and plan  #Loss of consciousness  Unable to determine if seizure provoked loss of consciousness and fall or if there is other etiology for a loss of consciousness. Will get an echocardiogram.  Check TSH. #Suspected lung malignancy. CT chest abdomen and pelvis report as below. Plan for bronchoscopy with EBUS tomorrow. #intraparenchymal hemorrhage with seizures. Repeat CT head showed stabilization of hemorrhage. MRI concerning for metastatic lesion. Continue Decadron, Keppra. SBP below 160. Neurosurgery, neurology on board. Left frontal hypodensity. Follow-up on CT abdomen, pelvis and chest to rule out any neoplasm. #Hyperlipidemia  Continue statin. #Active tobacco abuse  Counseled regarding cessation.           Medications:  Reviewed    Infusion Medications    dextrose       Scheduled Medications    amLODIPine  5 mg Oral Daily    atorvastatin  40 mg Oral Daily    dexamethasone  4 mg Oral 4 times per day    pantoprazole  40 mg IntraVENous Daily    levetiracetam  1,000 mg IntraVENous Q12H    atenolol  100 mg Oral Daily     PRN Meds: acetaminophen, ondansetron **OR** ondansetron, glucose, dextrose bolus **OR** dextrose bolus, glucagon (rDNA), dextrose, labetalol, perflutren lipid microspheres      Intake/Output Summary (Last 24 hours) at 5/11/2022 1146  Last data filed at 5/10/2022 1800  Gross per 24 hour   Intake 480 ml   Output 450 ml   Net 30 ml       Physical Exam Performed:    BP (!) 147/75   Pulse 67   Temp 97.3 °F (36.3 °C) (Axillary)   Resp 13   Ht 5' 5\" (1.651 m)   Wt 139 lb 15.9 oz (63.5 kg)   SpO2 95%   BMI 23.30 kg/m²     General appearance: NAD, appears stated age and cooperative. HEENT: Pupils equal, round, and reactive to light. Conjunctivae/corneas clear. Neck: Supple, with full range of motion. No jugular venous distention. Trachea midline. Respiratory:  Normal respiratory effort. Clear to auscultation, bilaterally without Rales/Wheezes/Rhonchi. Cardiovascular: Regular rate and rhythm with normal S1/S2 without murmurs, rubs or gallops. Abdomen: Soft, non-tender, non-distended with normal bowel sounds. Musculoskeletal: No clubbing, cyanosis or edema bilaterally. Full range of motion without deformity. Skin: Skin color, texture, turgor normal.  No rashes or lesions. Neurologic:  Neurovascularly intact without any focal sensory/motor deficits.  Cranial nerves: II-XII intact, grossly non-focal.  Psychiatric: Alert and oriented, thought content appropriate, normal insight  Capillary Refill: Brisk,< 3 seconds   Peripheral Pulses: +2 palpable, equal bilaterally       Labs:   Recent Labs     05/09/22  1815 05/11/22  0621   WBC 8.3 7.0   HGB 16.8* 17.1*   HCT 52.3* 50.9*    171     Recent Labs     05/09/22  1910 05/11/22  0621    135*   K 4.9 4.3   CL 98* 99 CO2 17* 16*   BUN 12 12   CREATININE 0.9 0.7   CALCIUM 9.8 10.3     Recent Labs     05/09/22 1910   AST 52*   ALT 28   BILITOT 0.6   ALKPHOS 96     Recent Labs     05/09/22  1815   INR 0.98     Recent Labs     05/09/22 1910   TROPONINI <0.01       Urinalysis:      Lab Results   Component Value Date    NITRU Negative 05/09/2022    WBCUA 3 05/09/2022    BACTERIA None Seen 05/09/2022    RBCUA 2 05/09/2022    BLOODU Negative 05/09/2022    SPECGRAV 1.025 05/09/2022    GLUCOSEU Negative 05/09/2022    GLUCOSEU NEGATIVE 03/21/2010       Radiology:  MRI BRAIN W WO CONTRAST   Final Result   1. The superior left frontal lesion demonstrates significant enhancement compatible with a hemorrhagic metastasis. 2. Motion artifact severely compresses evaluation there appears to be additional enhancement along the left temporal lesion suspicious for metastasis. 3. Additional FLAIR signal abnormalities with faint areas of enhancement in the left frontal lobe, right frontal lobe and in the left cerebellum are suspicious for additional metastasis. Follow-up MRI when the patient is better able to comply with motion    constraints would be recommended. 4. No significant progression in intracranial hemorrhage compared to prior exam.      CT CHEST ABDOMEN PELVIS W CONTRAST   Final Result      Spiculated ovoid nodule seen at left lung apex is suspicious for primary neoplasm. Extensive left hilar and mediastinal adenopathy is seen compatible with metastatic disease. Borderline heart size with coronary calcification. No acute infiltrates seen. No evidence of acute abnormality or metastatic disease identified in the abdomen or pelvis. CT HEAD WO CONTRAST   Final Result   1. Stable appearance of intra-axial left frontal lobe and left temporal lobe hemorrhage with vasogenic edema.       2. Patient status post seizure which may account for hemorrhage, posttraumatic, however, Magnetic resonance imaging without and with contrast is suggested to exclude underlying occult pathology. Assessment/Plan:    Active Hospital Problems    Diagnosis Date Noted    Intraparenchymal hemorrhage of brain ([facility] [address] [I61.9] 05/10/2022     Priority: Medium         DVT Prophylaxis: SCD  Diet: ADULT DIET; Regular; 4 carb choices (60 gm/meal)  Diet NPO Exceptions are: Sips of Water with Meds  Code Status: Full Code    PT/OT Eval Status: once more stable. Dispo - inpatient. Plan for bronchoscopy with EBUS tomorrow. DC in 2 to 3 days.     Ayala Hughes MD     This chart was likely completed using voice recognition technology and may contain unintended grammatical , phraseology,and/or punctuation errors

## 2022-05-11 NOTE — PROGRESS NOTES
NEUROSURGERY PROGRESS NOTE    Patient Name: Gasper Adamson YOB: 1965   Sex: Female Age: 60 yrs     Medical Record Number: [id number] Acct Number: [de-identified]   Room Number: 7195/0417-21 Hospital Day: Hospital Day: 2       Subjective: pt appears to be oriented when asked simple questions but then when talking in conversation will say something random. Objective:    VITAL SIGNS   BP (!) 155/94   Pulse 76   Temp 97.3 °F (36.3 °C) (Axillary)   Resp 19   Ht 5' 5\" (1.651 m)   Wt 139 lb 15.9 oz (63.5 kg)   SpO2 95%   BMI 23.30 kg/m²    Height Height: 5' 5\" (165.1 cm)   Weight Weight: 139 lb 15.9 oz (63.5 kg)        Allergies No Known Allergies   NPO Status Diet NPO Exceptions are: Sips of Water with Meds   Isolation No active isolations     LABS   Basic Metabolic Profile Recent Labs     05/09/22  1910 05/11/22  0621    135*   CL 98* 99   CO2 17* 16*   BUN 12 12   CREATININE 0.9 0.7   GLUCOSE 115* 108*   MG  --  1.90      Complete Blood Count Recent Labs     05/09/22  1815 05/11/22  0621   WBC 8.3 7.0   RBC 4.76 4.75      Coagulation Studies Recent Labs     05/09/22  1815   INR 0.98        MEDICATIONS   Inpatient Medications     amLODIPine, 5 mg, Oral, Daily    atorvastatin, 40 mg, Oral, Daily    dexamethasone, 4 mg, Oral, 4 times per day    pantoprazole, 40 mg, IntraVENous, Daily    levetiracetam, 1,000 mg, IntraVENous, Q12H    atenolol, 100 mg, Oral, Daily   Infusions    dextrose        Antibiotics   Recent Abx Admin      No antibiotic orders with administrations found.                  Neurologic Exam:  Mental status: awake and alert and oriented x 2  Cranial Nerves:  II: Visual acuity not tested, visual fields full, denies new visual changes / diplopia  III, IV, VI: PERRL, 3 mm bilaterally, EOMI, no nystagmus noted  V: Facial sensation intact bilaterally to touch  VII: Face symmetric  VIII: Hearing intact bilaterally to spoken voice  IX: Palate movement equal bilaterally  XI: Shoulder shrug equal bilaterally  XII: Tongue midline      Musculoskeletal:   Gait: Not tested   Tone: normal  Sensory: intact to all extremities  Motor strength:    Right  Left    Right  Left    Deltoid  5 5  Hip Flex  5 5   Biceps  5 5  Knee Extensors  5 5   Triceps  5 5  Knee Flexors  5 5   Wrist Ext  5 5  Ankle Dorsiflex. 5 5   Wrist Flex  5 5  Ankle Plantarflex. 5 5   Handgrip  5 5  Ext Luis E Longus  5 5   Thumb Ext  5 5       CT CHEST ABDOMEN PELVIS W CONTRAST    Result Date: 5/10/2022  Spiculated ovoid nodule seen at left lung apex is suspicious for primary neoplasm. Extensive left hilar and mediastinal adenopathy is seen compatible with metastatic disease. Borderline heart size with coronary calcification. No acute infiltrates seen. No evidence of acute abnormality or metastatic disease identified in the abdomen or pelvis. MRI BRAIN W WO CONTRAST    Result Date: 5/11/2022  1. The superior left frontal lesion demonstrates significant enhancement compatible with a hemorrhagic metastasis. 2. Motion artifact severely compresses evaluation there appears to be additional enhancement along the left temporal lesion suspicious for metastasis. 3. Additional FLAIR signal abnormalities with faint areas of enhancement in the left frontal lobe, right frontal lobe and in the left cerebellum are suspicious for additional metastasis. Follow-up MRI when the patient is better able to comply with motion  constraints would be recommended. 4. No significant progression in intracranial hemorrhage compared to prior exam.    Respiratory:  Unlabored respiratory pattern    Abdomen:   Soft, ND     Cardiovascular:  Warm, well perfused    Assessment   Patient is a 60 y.o. female w/ brain mass and ICH     Plan:  1. No emergent neurosurgical intervention indicated  2.  Neurologic exams frequency: Q4  3. Cerebral edema:  - Keep Na within normal limits  - Decadron 4 mg Q6H  - Keep HOB >30 degrees  4. Seizure prophylaxis: Keppra 1000mg BID  5. GI Prophylaxis: Pepcid  6. DVT Prophylaxis: SCD's  7. PT/OT consulted, appreciate recs  8. Consult pulm for lung biopsy  Patient was seen with Dr. Gasper Flores who agrees with above assessment and plan. Electronically signed by:  JACQUIE Mark - CNP, 5/11/2022 9:40 AM   Neurosurgery Nurse Practitioner  [phone]

## 2022-05-11 NOTE — PROGRESS NOTES
ICU Progress Note    Admit Date: 5/10/2022  Day: 2  Vent Day: None  IV Access:Peripheral  IV Fluids:None  Vasopressors:None                Antibiotics: None  Diet: Diet NPO Exceptions are: Sips of Water with Meds    CC: Seizure    Interval history:   -ANO x2 orientated to person and place but not time  -MRI Brain shows superior LT frontal lesion consistent with hemorrhagic metastasis. Enhancement in the RT frontal and LT cerebellum also concerning for metastasis  -CT C/A/P yesterday showed LT lung apex lesion suggesting primary neoplasm. Pt has extensive smoking history.  -Denies HA, SOB, Cough, Vision changes  -HTN last night given Labetalol and Hydralazine. Goal SBP <160mmHg    HPI:   \"DB, 61 yo F with PMHx of HTN and HLD presenting with seizure activity. She was at her baseline normal yesterday (5/9) at 14:30. She was then found on the ground by her , unresponsive. He called EMS who then noted seizure activity and a bitten tongue. She was transferred to the ED at [facility] where she was loaded with Decadron and Keppra. CT Head showed intraparenchymal hemorrhage in LT temporal lobe and LT brain hyperdensity in parietal lobe. She was then transferred to [facility]     Pt endorses no previous hx of seizures and does not remember the event or being transferred to the hospital. She was unsure if there was any loss of urine or bowel function during event. She states that she had and MRI 2 years ago but cannot remember why but states everything was normal. She has been to a specialized clinic in the last year for loss of balance without improvement. She endorses during that time in the last year having intermittent dizziness. She smoke a half a pack of cigarettes daily and 2-3 drinks of liquor daily. She states her last colonoscopy and mammogram were about 5-7 years ago but she cannot remember when she had her last pap smear but says it was normal at that time.  She denies changes in weight, fatigue, weakness, numbness or tingling. She denies changes in bowel habits, cough, SOB, or menstrual irregularities. She denies any family history of cancer. \"    Medications:     Scheduled Meds:   atorvastatin  40 mg Oral Daily    dexamethasone  4 mg Oral 4 times per day    pantoprazole  40 mg IntraVENous Daily    levetiracetam  1,000 mg IntraVENous Q12H    atenolol  100 mg Oral Daily     Continuous Infusions:   dextrose       PRN Meds:acetaminophen, ondansetron **OR** ondansetron, glucose, dextrose bolus **OR** dextrose bolus, glucagon (rDNA), dextrose, labetalol, perflutren lipid microspheres    Objective:   Vitals:   T-max:  Patient Vitals for the past 8 hrs:   BP Temp Pulse Resp SpO2   05/11/22 0735 (!) 140/111  79 21 96 %   05/11/22 0730 (!) 164/98  79 22    05/11/22 0700 (!) 146/83  67 20    05/11/22 0600 (!) 167/94  69 20    05/11/22 0500 (!) 156/83  67 24    05/11/22 0410 (!) 160/90  87 26 93 %   05/11/22 0400 (!) 168/106 98 °F (36.7 °C) 89 22 93 %   05/11/22 0306     96 %   05/11/22 0300 (!) 169/92  77 17    05/11/22 0200 (!) 159/94  71 18 (!) 89 %   05/11/22 0100 (!) 164/105  77 14 96 %   05/11/22 0035 (!) 182/116 97.5 °F (36.4 °C) 66 16 97 %       Intake/Output Summary (Last 24 hours) at 5/11/2022 0818  Last data filed at 5/10/2022 1800  Gross per 24 hour   Intake 480 ml   Output 550 ml   Net -70 ml       Review of Systems   Constitutional: Negative for activity change, appetite change, fatigue and unexpected weight change. HENT: Negative. Eyes: Negative for visual disturbance. Respiratory: Negative for cough and shortness of breath. Cardiovascular: Positive for chest pain. Negative for palpitations. Gastrointestinal: Negative for abdominal distention, abdominal pain, constipation, diarrhea and nausea. Endocrine: Negative. Genitourinary: Negative. Musculoskeletal: Negative for gait problem. Skin: Negative.     Neurological: Negative for dizziness, facial asymmetry, weakness, light-headedness, numbness and headaches. Psychiatric/Behavioral: Positive for confusion. Physical Exam  Constitutional:       Appearance: Normal appearance. HENT:      Head: Normocephalic and atraumatic. Mouth/Throat:      Mouth: Mucous membranes are moist.      Pharynx: Oropharynx is clear. Eyes:      Extraocular Movements: Extraocular movements intact. Conjunctiva/sclera: Conjunctivae normal.      Pupils: Pupils are equal, round, and reactive to light. Cardiovascular:      Rate and Rhythm: Normal rate and regular rhythm. Pulses: Normal pulses. Heart sounds: Normal heart sounds. Pulmonary:      Effort: Pulmonary effort is normal.      Breath sounds: Normal breath sounds. Abdominal:      General: Abdomen is flat. Bowel sounds are normal.      Palpations: Abdomen is soft. Skin:     General: Skin is warm. Capillary Refill: Capillary refill takes less than 2 seconds. Neurological:      General: No focal deficit present. Mental Status: She is alert. Sensory: No sensory deficit. Motor: No weakness. Gait: Gait normal.      Deep Tendon Reflexes: Reflexes normal.      Comments: ANO x2 to person and place but not time           LABS:    CBC:   Recent Labs     05/09/22  1815 05/11/22  0621   WBC 8.3 7.0   HGB 16.8* 17.1*   HCT 52.3* 50.9*    171   .8* 107.2*     Renal:    Recent Labs     05/09/22 1910 05/11/22  0621    135*   K 4.9 4.3   CL 98* 99   CO2 17* 16*   BUN 12 12   CREATININE 0.9 0.7   GLUCOSE 115* 108*   CALCIUM 9.8 10.3   MG  --  1.90   ANIONGAP 22* 20*     Hepatic:   Recent Labs     05/09/22 1910   AST 52*   ALT 28   BILITOT 0.6   PROT 7.5   LABALBU 4.4   ALKPHOS 96     Troponin:   Recent Labs     05/09/22 1910   TROPONINI <0.01     BNP: No results for input(s): BNP in the last 72 hours. Lipids: No results for input(s): CHOL, HDL in the last 72 hours.     Invalid input(s): LDLCALCU, TRIGLYCERIDE  ABGs:    Recent Labs     05/09/22  1848   PHART 7.361   [id number] 39.2   PO2ART 141.0*   LEU4BGJ 22.2   BEART -3.0   [id number] 99.9   PEE5IUJ 52.4       INR:   Recent Labs     05/09/22  1815   INR 0.98     Lactate: No results for input(s): LACTATE in the last 72 hours. Cultures:  -----------------------------------------------------------------  RAD:   MRI BRAIN W WO CONTRAST   Final Result   1. The superior left frontal lesion demonstrates significant enhancement compatible with a hemorrhagic metastasis. 2. Motion artifact severely compresses evaluation there appears to be additional enhancement along the left temporal lesion suspicious for metastasis. 3. Additional FLAIR signal abnormalities with faint areas of enhancement in the left frontal lobe, right frontal lobe and in the left cerebellum are suspicious for additional metastasis. Follow-up MRI when the patient is better able to comply with motion    constraints would be recommended. 4. No significant progression in intracranial hemorrhage compared to prior exam.      CT CHEST ABDOMEN PELVIS W CONTRAST   Final Result      Spiculated ovoid nodule seen at left lung apex is suspicious for primary neoplasm. Extensive left hilar and mediastinal adenopathy is seen compatible with metastatic disease. Borderline heart size with coronary calcification. No acute infiltrates seen. No evidence of acute abnormality or metastatic disease identified in the abdomen or pelvis. CT HEAD WO CONTRAST   Final Result   1. Stable appearance of intra-axial left frontal lobe and left temporal lobe hemorrhage with vasogenic edema. 2. Patient status post seizure which may account for hemorrhage, posttraumatic, however, Magnetic resonance imaging without and with contrast is suggested to exclude underlying occult pathology.       CT NEEDLE BIOPSY LUNG PERCUTANEOUS    (Results Pending)         Assessment/Plan:   Neuro  #LT Frontal Hyperdensity  -S/P Decadron and Keppra loading in ED at [facility] before transfer to [facility]  -2.5 x 2cm Hyperdensity in LT frontal region on repeat CT Head   -Vasogenic edema and hemorrhage  -CT C/A/P show spiculated ovoid mass in LT lung apex suggesting primary neoplasm. Extensive smoking history. -MRI Brain shows LT frontal Lesion consistent with hemorrhagic metastasis. Enhancement in RT Frontal and LT cerebellum suggesting metastasis.   -Neurosurgery consulted. Wants to keep IP for 2-3 more days   -Q1H Neuro checks  -Continue decadron 4mg Q6H  -Continue Keppra 500mg Q12H  -NPO for EBUS with biopsy. Canceled CT guided biopsy due to high risk of PTX given tumor location. She will be better suited for EBUS with biopsy given her mediastinal involvement     #Intraparenchymal Hemorrhage in LT Temporal Region  -13mm hyperdensity found on repeat CT Head representing hemorrhage and vasogenic edema  -SBP <160 mmg    #Seizure  -No prior Hx of Seizures  -Continue Keppra 500mg Q12H     Cardio  -HLD, continue statin  -HTN. SBP in 160-180s last night. 1 dose Hydralazine and 2 doses Labetalol prn given. SBP now 140-150s  -SBP <160mmgHg      GI PPX: Continue pantoprazole     DVT PPX: SCDs    Code Status: Full  FEN: NPO  PPX: SCDs  DISPO: ICU    This patient has been staffed and discussed with Dr. Manjinder Santana     This note was scribed by Tessa Hook who participated in patient care.     Shay Cisneros MD  PGY2 IM Internal Medicine Resident  05/11/22  8:18 AM

## 2022-05-11 NOTE — PROGRESS NOTES
Clinical Pharmacy Consult Note    60 y.o. female admitted with intraparenchymal hemorrhage. Pharmacy has been asked by Dr. Chris Ruelas to adjust all drips to normal saline as appropriate based on compatibility to avoid fluid shifts since D5 is osmotically active. The following intermittent IV drips/infusions have been adjusted to saline:  Keppra (already in NS)    The following medications must remain in D5W due to incompatibility with normal saline:  Amphotericin  Mycophenolate  Nitroprusside  Penicillin G Potassium    Please be aware that patient has D5W ordered as part of hypoglycemia orderset. LTAC, located within [facility] will follow daily to ensure all new IVPBs + drips are in NS. Please call with questions.   Shayy Hall, EmiliaD  PGY-1 Pharmacy Resident  [id number]  5/11/2022 10:20 AM 22-Jun-2019 08:31

## 2022-05-12 ENCOUNTER — ANESTHESIA EVENT (OUTPATIENT)
Dept: ENDOSCOPY | Age: 57
DRG: 166 | End: 2022-05-12
Payer: COMMERCIAL

## 2022-05-12 ENCOUNTER — ANESTHESIA (OUTPATIENT)
Dept: ENDOSCOPY | Age: 57
DRG: 166 | End: 2022-05-12
Payer: COMMERCIAL

## 2022-05-12 VITALS — TEMPERATURE: 59.9 F | SYSTOLIC BLOOD PRESSURE: 174 MMHG | DIASTOLIC BLOOD PRESSURE: 118 MMHG | OXYGEN SATURATION: 96 %

## 2022-05-12 VITALS
RESPIRATION RATE: 19 BRPM | WEIGHT: 139.99 LBS | HEART RATE: 70 BPM | OXYGEN SATURATION: 96 % | BODY MASS INDEX: 23.32 KG/M2 | HEIGHT: 65 IN | DIASTOLIC BLOOD PRESSURE: 94 MMHG | TEMPERATURE: 98.2 F | SYSTOLIC BLOOD PRESSURE: 153 MMHG

## 2022-05-12 LAB
ANION GAP SERPL CALCULATED.3IONS-SCNC: 18 MMOL/L (ref 3–16)
BASOPHILS ABSOLUTE: 0 K/UL (ref 0–0.2)
BASOPHILS RELATIVE PERCENT: 0.1 %
BUN BLDV-MCNC: 19 MG/DL (ref 7–20)
CALCIUM SERPL-MCNC: 10.1 MG/DL (ref 8.3–10.6)
CHLORIDE BLD-SCNC: 99 MMOL/L (ref 99–110)
CO2: 20 MMOL/L (ref 21–32)
CREAT SERPL-MCNC: 0.8 MG/DL (ref 0.6–1.1)
EOSINOPHILS ABSOLUTE: 0 K/UL (ref 0–0.6)
EOSINOPHILS RELATIVE PERCENT: 0 %
ESTIMATED AVERAGE GLUCOSE: 82.5 MG/DL
GFR AFRICAN AMERICAN: >60
GFR NON-AFRICAN AMERICAN: >60
GLUCOSE BLD-MCNC: 107 MG/DL (ref 70–99)
GLUCOSE BLD-MCNC: 120 MG/DL (ref 70–99)
GLUCOSE BLD-MCNC: 123 MG/DL (ref 70–99)
HBA1C MFR BLD: 4.5 %
HCT VFR BLD CALC: 51.3 % (ref 36–48)
HEMOGLOBIN: 16.8 G/DL (ref 12–16)
LYMPHOCYTES ABSOLUTE: 0.7 K/UL (ref 1–5.1)
LYMPHOCYTES RELATIVE PERCENT: 10 %
MAGNESIUM: 1.9 MG/DL (ref 1.8–2.4)
MCH RBC QN AUTO: 34.7 PG (ref 26–34)
MCHC RBC AUTO-ENTMCNC: 32.8 G/DL (ref 31–36)
MCV RBC AUTO: 105.9 FL (ref 80–100)
MONOCYTES ABSOLUTE: 0.3 K/UL (ref 0–1.3)
MONOCYTES RELATIVE PERCENT: 3.6 %
NEUTROPHILS ABSOLUTE: 6.1 K/UL (ref 1.7–7.7)
NEUTROPHILS RELATIVE PERCENT: 86.3 %
PDW BLD-RTO: 14.3 % (ref 12.4–15.4)
PERFORMED ON: ABNORMAL
PERFORMED ON: ABNORMAL
PLATELET # BLD: 204 K/UL (ref 135–450)
PMV BLD AUTO: 9.5 FL (ref 5–10.5)
POTASSIUM SERPL-SCNC: 4.6 MMOL/L (ref 3.5–5.1)
RBC # BLD: 4.85 M/UL (ref 4–5.2)
SODIUM BLD-SCNC: 137 MMOL/L (ref 136–145)
WBC # BLD: 7.1 K/UL (ref 4–11)

## 2022-05-12 PROCEDURE — 97116 GAIT TRAINING THERAPY: CPT

## 2022-05-12 PROCEDURE — 0BBG8ZX EXCISION OF LEFT UPPER LUNG LOBE, VIA NATURAL OR ARTIFICIAL OPENING ENDOSCOPIC, DIAGNOSTIC: ICD-10-PCS | Performed by: INTERNAL MEDICINE

## 2022-05-12 PROCEDURE — 6360000002 HC RX W HCPCS: Performed by: PHYSICIAN ASSISTANT

## 2022-05-12 PROCEDURE — 6370000000 HC RX 637 (ALT 250 FOR IP): Performed by: PHYSICIAN ASSISTANT

## 2022-05-12 PROCEDURE — 6360000002 HC RX W HCPCS: Performed by: NURSE ANESTHETIST, CERTIFIED REGISTERED

## 2022-05-12 PROCEDURE — 2580000003 HC RX 258: Performed by: NURSE ANESTHETIST, CERTIFIED REGISTERED

## 2022-05-12 PROCEDURE — 31625 BRONCHOSCOPY W/BIOPSY(S): CPT | Performed by: INTERNAL MEDICINE

## 2022-05-12 PROCEDURE — 2580000003 HC RX 258: Performed by: PHYSICIAN ASSISTANT

## 2022-05-12 PROCEDURE — 2500000003 HC RX 250 WO HCPCS: Performed by: NURSE ANESTHETIST, CERTIFIED REGISTERED

## 2022-05-12 PROCEDURE — 97530 THERAPEUTIC ACTIVITIES: CPT

## 2022-05-12 PROCEDURE — 3700000000 HC ANESTHESIA ATTENDED CARE: Performed by: INTERNAL MEDICINE

## 2022-05-12 PROCEDURE — 88341 IMHCHEM/IMCYTCHM EA ADD ANTB: CPT

## 2022-05-12 PROCEDURE — 3609011300 HC BRONCHOSCOPY BRONCHIAL/ENDOBRNCL BX 1+ SITES: Performed by: INTERNAL MEDICINE

## 2022-05-12 PROCEDURE — 85025 COMPLETE CBC W/AUTO DIFF WBC: CPT

## 2022-05-12 PROCEDURE — 83735 ASSAY OF MAGNESIUM: CPT

## 2022-05-12 PROCEDURE — 88342 IMHCHEM/IMCYTCHM 1ST ANTB: CPT

## 2022-05-12 PROCEDURE — 2709999900 HC NON-CHARGEABLE SUPPLY: Performed by: INTERNAL MEDICINE

## 2022-05-12 PROCEDURE — 88305 TISSUE EXAM BY PATHOLOGIST: CPT

## 2022-05-12 PROCEDURE — 36415 COLL VENOUS BLD VENIPUNCTURE: CPT

## 2022-05-12 PROCEDURE — 3700000001 HC ADD 15 MINUTES (ANESTHESIA): Performed by: INTERNAL MEDICINE

## 2022-05-12 PROCEDURE — C9113 INJ PANTOPRAZOLE SODIUM, VIA: HCPCS | Performed by: PHYSICIAN ASSISTANT

## 2022-05-12 PROCEDURE — 80048 BASIC METABOLIC PNL TOTAL CA: CPT

## 2022-05-12 RX ORDER — ROCURONIUM BROMIDE 10 MG/ML
INJECTION, SOLUTION INTRAVENOUS PRN
Status: DISCONTINUED | OUTPATIENT
Start: 2022-05-12 | End: 2022-05-12 | Stop reason: SDUPTHER

## 2022-05-12 RX ORDER — OXYCODONE HYDROCHLORIDE 5 MG/1
5 TABLET ORAL PRN
Status: CANCELLED | OUTPATIENT
Start: 2022-05-12 | End: 2022-05-12

## 2022-05-12 RX ORDER — LEVETIRACETAM 500 MG/1
1000 TABLET ORAL 2 TIMES DAILY
Qty: 60 TABLET | Refills: 1 | Status: SHIPPED | OUTPATIENT
Start: 2022-05-12

## 2022-05-12 RX ORDER — AMLODIPINE BESYLATE 5 MG/1
5 TABLET ORAL DAILY
Qty: 30 TABLET | Refills: 1 | Status: SHIPPED | OUTPATIENT
Start: 2022-05-13

## 2022-05-12 RX ORDER — ONDANSETRON 4 MG/1
4 TABLET, ORALLY DISINTEGRATING ORAL EVERY 8 HOURS PRN
Qty: 30 TABLET | Refills: 0 | Status: SHIPPED | OUTPATIENT
Start: 2022-05-12 | End: 2022-05-12

## 2022-05-12 RX ORDER — FENTANYL CITRATE 50 UG/ML
25 INJECTION, SOLUTION INTRAMUSCULAR; INTRAVENOUS EVERY 5 MIN PRN
Status: CANCELLED | OUTPATIENT
Start: 2022-05-12

## 2022-05-12 RX ORDER — LABETALOL HYDROCHLORIDE 5 MG/ML
10 INJECTION, SOLUTION INTRAVENOUS
Status: CANCELLED | OUTPATIENT
Start: 2022-05-12

## 2022-05-12 RX ORDER — ONDANSETRON 2 MG/ML
4 INJECTION INTRAMUSCULAR; INTRAVENOUS
Status: CANCELLED | OUTPATIENT
Start: 2022-05-12 | End: 2022-05-12

## 2022-05-12 RX ORDER — NICOTINE 21 MG/24HR
1 PATCH, TRANSDERMAL 24 HOURS TRANSDERMAL DAILY
Qty: 14 PATCH | Refills: 0 | Status: SHIPPED | OUTPATIENT
Start: 2022-05-27 | End: 2022-06-10

## 2022-05-12 RX ORDER — ONDANSETRON 4 MG/1
4 TABLET, ORALLY DISINTEGRATING ORAL EVERY 8 HOURS PRN
Qty: 30 TABLET | Refills: 0 | Status: SHIPPED | OUTPATIENT
Start: 2022-05-12

## 2022-05-12 RX ORDER — EPINEPHRINE 1 MG/ML(1)
AMPUL (ML) INJECTION PRN
Status: DISCONTINUED | OUTPATIENT
Start: 2022-05-12 | End: 2022-05-12 | Stop reason: ALTCHOICE

## 2022-05-12 RX ORDER — PANTOPRAZOLE SODIUM 40 MG/1
40 TABLET, DELAYED RELEASE ORAL
Qty: 30 TABLET | Refills: 1 | Status: SHIPPED | OUTPATIENT
Start: 2022-05-12

## 2022-05-12 RX ORDER — LEVETIRACETAM 500 MG/1
1000 TABLET ORAL 2 TIMES DAILY
Qty: 60 TABLET | Refills: 1 | Status: SHIPPED | OUTPATIENT
Start: 2022-05-12 | End: 2022-05-12 | Stop reason: SDUPTHER

## 2022-05-12 RX ORDER — PROPOFOL 10 MG/ML
INJECTION, EMULSION INTRAVENOUS PRN
Status: DISCONTINUED | OUTPATIENT
Start: 2022-05-12 | End: 2022-05-12 | Stop reason: SDUPTHER

## 2022-05-12 RX ORDER — DIPHENHYDRAMINE HYDROCHLORIDE 50 MG/ML
12.5 INJECTION INTRAMUSCULAR; INTRAVENOUS
Status: CANCELLED | OUTPATIENT
Start: 2022-05-12 | End: 2022-05-12

## 2022-05-12 RX ORDER — NICOTINE 21 MG/24HR
1 PATCH, TRANSDERMAL 24 HOURS TRANSDERMAL DAILY
Qty: 14 PATCH | Refills: 0 | Status: SHIPPED | OUTPATIENT
Start: 2022-05-12 | End: 2022-05-26

## 2022-05-12 RX ORDER — LORAZEPAM 2 MG/ML
0.5 INJECTION INTRAMUSCULAR
Status: CANCELLED | OUTPATIENT
Start: 2022-05-12 | End: 2022-05-12

## 2022-05-12 RX ORDER — SODIUM CHLORIDE 0.9 % (FLUSH) 0.9 %
5-40 SYRINGE (ML) INJECTION EVERY 12 HOURS SCHEDULED
Status: CANCELLED | OUTPATIENT
Start: 2022-05-12

## 2022-05-12 RX ORDER — NICOTINE 21 MG/24HR
1 PATCH, TRANSDERMAL 24 HOURS TRANSDERMAL DAILY
Qty: 14 PATCH | Refills: 0 | Status: SHIPPED | OUTPATIENT
Start: 2022-05-27 | End: 2022-05-12 | Stop reason: SDUPTHER

## 2022-05-12 RX ORDER — PANTOPRAZOLE SODIUM 40 MG/1
40 TABLET, DELAYED RELEASE ORAL
Qty: 30 TABLET | Refills: 1 | Status: SHIPPED | OUTPATIENT
Start: 2022-05-12 | End: 2022-05-12 | Stop reason: SDUPTHER

## 2022-05-12 RX ORDER — DEXAMETHASONE 4 MG/1
4 TABLET ORAL EVERY 8 HOURS SCHEDULED
Status: DISCONTINUED | OUTPATIENT
Start: 2022-05-12 | End: 2022-05-12 | Stop reason: HOSPADM

## 2022-05-12 RX ORDER — OXYCODONE HYDROCHLORIDE 5 MG/1
10 TABLET ORAL PRN
Status: CANCELLED | OUTPATIENT
Start: 2022-05-12 | End: 2022-05-12

## 2022-05-12 RX ORDER — DEXAMETHASONE 4 MG/1
4 TABLET ORAL EVERY 8 HOURS SCHEDULED
Qty: 90 TABLET | Refills: 0 | Status: SHIPPED | OUTPATIENT
Start: 2022-05-12 | End: 2022-05-12

## 2022-05-12 RX ORDER — GLYCOPYRROLATE 0.2 MG/ML
INJECTION INTRAMUSCULAR; INTRAVENOUS PRN
Status: DISCONTINUED | OUTPATIENT
Start: 2022-05-12 | End: 2022-05-12 | Stop reason: SDUPTHER

## 2022-05-12 RX ORDER — NICOTINE 21 MG/24HR
1 PATCH, TRANSDERMAL 24 HOURS TRANSDERMAL DAILY
Qty: 14 PATCH | Refills: 0 | Status: SHIPPED | OUTPATIENT
Start: 2022-05-12 | End: 2022-05-12 | Stop reason: SDUPTHER

## 2022-05-12 RX ORDER — SODIUM CHLORIDE, SODIUM LACTATE, POTASSIUM CHLORIDE, CALCIUM CHLORIDE 600; 310; 30; 20 MG/100ML; MG/100ML; MG/100ML; MG/100ML
INJECTION, SOLUTION INTRAVENOUS CONTINUOUS PRN
Status: DISCONTINUED | OUTPATIENT
Start: 2022-05-12 | End: 2022-05-12 | Stop reason: SDUPTHER

## 2022-05-12 RX ORDER — SODIUM CHLORIDE 9 MG/ML
INJECTION, SOLUTION INTRAVENOUS CONTINUOUS
Status: CANCELLED | OUTPATIENT
Start: 2022-05-12

## 2022-05-12 RX ORDER — DEXAMETHASONE 4 MG/1
4 TABLET ORAL EVERY 8 HOURS SCHEDULED
Qty: 90 TABLET | Refills: 0 | Status: SHIPPED | OUTPATIENT
Start: 2022-05-12 | End: 2022-06-11

## 2022-05-12 RX ORDER — SODIUM CHLORIDE 0.9 % (FLUSH) 0.9 %
5-40 SYRINGE (ML) INJECTION PRN
Status: CANCELLED | OUTPATIENT
Start: 2022-05-12

## 2022-05-12 RX ORDER — SODIUM CHLORIDE 9 MG/ML
25 INJECTION, SOLUTION INTRAVENOUS PRN
Status: CANCELLED | OUTPATIENT
Start: 2022-05-12

## 2022-05-12 RX ORDER — AMLODIPINE BESYLATE 5 MG/1
5 TABLET ORAL DAILY
Qty: 30 TABLET | Refills: 1 | Status: SHIPPED | OUTPATIENT
Start: 2022-05-13 | End: 2022-05-12

## 2022-05-12 RX ADMIN — DEXAMETHASONE 4 MG: 4 TABLET ORAL at 06:44

## 2022-05-12 RX ADMIN — SUGAMMADEX 200 MG: 100 INJECTION, SOLUTION INTRAVENOUS at 08:24

## 2022-05-12 RX ADMIN — AMLODIPINE BESYLATE 5 MG: 5 TABLET ORAL at 08:55

## 2022-05-12 RX ADMIN — SODIUM CHLORIDE, SODIUM LACTATE, POTASSIUM CHLORIDE, AND CALCIUM CHLORIDE: .6; .31; .03; .02 INJECTION, SOLUTION INTRAVENOUS at 07:53

## 2022-05-12 RX ADMIN — PROPOFOL 50 MG: 10 INJECTION, EMULSION INTRAVENOUS at 08:03

## 2022-05-12 RX ADMIN — Medication 50 MG: at 07:57

## 2022-05-12 RX ADMIN — ATORVASTATIN CALCIUM 40 MG: 40 TABLET, FILM COATED ORAL at 08:55

## 2022-05-12 RX ADMIN — PROPOFOL 150 MG: 10 INJECTION, EMULSION INTRAVENOUS at 07:57

## 2022-05-12 RX ADMIN — GLYCOPYRROLATE 0.2 MG: 0.2 INJECTION INTRAMUSCULAR; INTRAVENOUS at 07:58

## 2022-05-12 RX ADMIN — PANTOPRAZOLE SODIUM 40 MG: 40 INJECTION, POWDER, FOR SOLUTION INTRAVENOUS at 08:55

## 2022-05-12 RX ADMIN — ROCURONIUM BROMIDE 50 MG: 50 INJECTION, SOLUTION INTRAVENOUS at 07:58

## 2022-05-12 RX ADMIN — LEVETIRACETAM 1000 MG: 100 INJECTION, SOLUTION, CONCENTRATE INTRAVENOUS at 08:57

## 2022-05-12 RX ADMIN — ATENOLOL 100 MG: 50 TABLET ORAL at 08:55

## 2022-05-12 ASSESSMENT — PULMONARY FUNCTION TESTS
PIF_VALUE: 1
PIF_VALUE: 15
PIF_VALUE: 0
PIF_VALUE: 1
PIF_VALUE: 2
PIF_VALUE: 15
PIF_VALUE: 0
PIF_VALUE: 15
PIF_VALUE: 1
PIF_VALUE: 4
PIF_VALUE: 14
PIF_VALUE: 0
PIF_VALUE: 17
PIF_VALUE: 1
PIF_VALUE: 18
PIF_VALUE: 22
PIF_VALUE: 16
PIF_VALUE: 20
PIF_VALUE: 0
PIF_VALUE: 1
PIF_VALUE: 0
PIF_VALUE: 15
PIF_VALUE: 0
PIF_VALUE: 20
PIF_VALUE: 0
PIF_VALUE: 17
PIF_VALUE: 4
PIF_VALUE: 20
PIF_VALUE: 0
PIF_VALUE: 21
PIF_VALUE: 0
PIF_VALUE: 16
PIF_VALUE: 19
PIF_VALUE: 0
PIF_VALUE: 18
PIF_VALUE: 14
PIF_VALUE: 0
PIF_VALUE: 18
PIF_VALUE: 14
PIF_VALUE: 1
PIF_VALUE: 20
PIF_VALUE: 0
PIF_VALUE: 0
PIF_VALUE: 20
PIF_VALUE: 1
PIF_VALUE: 0
PIF_VALUE: 15
PIF_VALUE: 15
PIF_VALUE: 1
PIF_VALUE: 0
PIF_VALUE: 21
PIF_VALUE: 19
PIF_VALUE: 0
PIF_VALUE: 5
PIF_VALUE: 21
PIF_VALUE: 19
PIF_VALUE: 14
PIF_VALUE: 0
PIF_VALUE: 15
PIF_VALUE: 1
PIF_VALUE: 0
PIF_VALUE: 0
PIF_VALUE: 22
PIF_VALUE: 0
PIF_VALUE: 13
PIF_VALUE: 4
PIF_VALUE: 0
PIF_VALUE: 0
PIF_VALUE: 20
PIF_VALUE: 0

## 2022-05-12 ASSESSMENT — PAIN SCALES - GENERAL: PAINLEVEL_OUTOF10: 0

## 2022-05-12 ASSESSMENT — PAIN - FUNCTIONAL ASSESSMENT: PAIN_FUNCTIONAL_ASSESSMENT: NONE - DENIES PAIN

## 2022-05-12 NOTE — PROGRESS NOTES
Occupational Therapy  No Treatment    Pt currently off floor in [facility] for Bronchoscopy Biopsy. Will attempt to see pt later as is medically appropriate. Continue OT per POC.      [address] [place]

## 2022-05-12 NOTE — PROGRESS NOTES
Speech Language Pathology  Treatment attempt    Chart reviewed. Attempted to see pt this date to address cognition, but was out for procedure. Will attempt again later as time permits.     Fabrizio Vicente, [place], [address]  Speech-Language Pathologist  Pager [phone]

## 2022-05-12 NOTE — DISCHARGE SUMMARY
Hospital Medicine Discharge Summary    Patient ID: Lynda Palacios      Patient's PCP: Cesar Umanzor MD    Admit Date: 5/10/2022     Discharge Date:   5/12/2022    Admitting Physician: Barbie Travis DO     Discharge Physician: Irene Runner, MD     Discharge Diagnoses: Active Hospital Problems    Diagnosis Date Noted    Lung mass [R91.8]      Priority: Medium    Mediastinal lymphadenopathy [R59.0]      Priority: Medium    Intraparenchymal hemorrhage of brain [facility]) [I61.9] 05/10/2022     Priority: Medium       The patient was seen and examined on day of discharge and this discharge summary is in conjunction with any daily progress note from day of discharge. Hospital Course: Lynda Palacios 60 y.o. female with history of hypertension, hyperlipidemia, active tobacco abuse presented to [place] ER for syncope. Patient had an unwitnessed fall. Patient  noticed postictal confusion, tongue biting frothing around mouth. CT head revealed intraparenchymal hemorrhage with apparent mass-effect. Patient was transferred to the [facility] for neurosurgery eval.  CT chest abdomen and pelvis speculated ovoid nodule seen in the left lung apex suspicious for primary neoplasm. Extensive left hilar and mediastinal adenopathy.     MRI brain with and without contrast.  Compatible with hemorrhagic metastasis. Status post bronchoscopy with biopsy on 5/12. Patient was elevated by radiation oncology recommended to continue Decadron and Keppra at time of discharge. She is scheduled to get gamma knife radiation on Monday. Discharge instructions discussed with patient she verbalizes understanding. Patient was seen and examined on day of discharge denies any headache, nausea, vomiting, fever, chills. No acute event reported overnight. Assessment and plan  #Suspected lung cancer s/p bronchoscopy with biopsy 5/12  #Metastasis to brain.   Radiation oncology recommended Decadron, Keppra at time of discharge. Plan for gamma knife radiation on Monday. Patient to follow-up with PCP, pulmonary and radiation oncology. #Hypertension  #Hyperlipidemia  #Active tobacco abuse. #Anesthesia clearance for Gamma knife. RCRI 0 Echo EF 50 to 55%. Patient can get general anesthesia for gamma knife radiation without any further work-up. She is low risk to go under general anesthesia. Physical Exam Performed:     BP (!) 172/112   Pulse 58   Temp 97.4 °F (36.3 °C) (Temporal)   Resp 18   Ht 5' 5\" (1.651 m)   Wt 139 lb 15.9 oz (63.5 kg)   SpO2 96%   BMI 23.30 kg/m²       General appearance:  No apparent distress, appears stated age and cooperative. HEENT:  Normal cephalic, atraumatic without obvious deformity. Pupils equal, round, and reactive to light. Extra ocular muscles intact. Conjunctivae/corneas clear. Neck: Supple, with full range of motion. No jugular venous distention. Trachea midline. Respiratory:  Normal respiratory effort. Clear to auscultation, bilaterally without Rales/Wheezes/Rhonchi. Cardiovascular:  Regular rate and rhythm with normal S1/S2 without murmurs, rubs or gallops. Abdomen: Soft, non-tender, non-distended with normal bowel sounds. Musculoskeletal:  No clubbing, cyanosis or edema bilaterally. Full range of motion without deformity. Skin: Skin color, texture, turgor normal.  No rashes or lesions. Neurologic:  Neurovascularly intact without any focal sensory/motor deficits. Cranial nerves: II-XII intact, grossly non-focal.  Psychiatric:  Alert and oriented, thought content appropriate, normal insight  Capillary Refill: Brisk,< 3 seconds   Peripheral Pulses: +2 palpable, equal bilaterally       Labs:  For convenience and continuity at follow-up the following most recent labs are provided:      CBC:    Lab Results   Component Value Date    WBC 7.1 05/12/2022    HGB 16.8 05/12/2022    HCT 51.3 05/12/2022     05/12/2022       Renal:    Lab Results   Component Value Date     05/12/2022    K 4.6 05/12/2022    K 4.9 05/09/2022    CL 99 05/12/2022    CO2 20 05/12/2022    BUN 19 05/12/2022    CREATININE 0.8 05/12/2022    CALCIUM 10.1 05/12/2022    PHOS 3.5 01/04/2011         Significant Diagnostic Studies    Radiology:   MRI BRAIN W WO CONTRAST   Final Result   1. The superior left frontal lesion demonstrates significant enhancement compatible with a hemorrhagic metastasis. 2. Motion artifact severely compresses evaluation there appears to be additional enhancement along the left temporal lesion suspicious for metastasis. 3. Additional FLAIR signal abnormalities with faint areas of enhancement in the left frontal lobe, right frontal lobe and in the left cerebellum are suspicious for additional metastasis. Follow-up MRI when the patient is better able to comply with motion    constraints would be recommended. 4. No significant progression in intracranial hemorrhage compared to prior exam.      CT CHEST ABDOMEN PELVIS W CONTRAST   Final Result      Spiculated ovoid nodule seen at left lung apex is suspicious for primary neoplasm. Extensive left hilar and mediastinal adenopathy is seen compatible with metastatic disease. Borderline heart size with coronary calcification. No acute infiltrates seen. No evidence of acute abnormality or metastatic disease identified in the abdomen or pelvis. CT HEAD WO CONTRAST   Final Result   1. Stable appearance of intra-axial left frontal lobe and left temporal lobe hemorrhage with vasogenic edema. 2. Patient status post seizure which may account for hemorrhage, posttraumatic, however, Magnetic resonance imaging without and with contrast is suggested to exclude underlying occult pathology.              Consults:     IP CONSULT TO NEUROSURGERY  IP CONSULT TO PHARMACY  PHARMACY TO CHANGE BASE FLUIDS  IP CONSULT TO NEUROLOGY  IP CONSULT TO PULMONOLOGY  IP CONSULT TO RADIATION ONCOLOGY    Disposition: Home.    Condition at Discharge: Stable    Discharge Instructions/Follow-up: PCP, pulmonary, radiation oncology. Code Status:  Full Code     Activity: activity as tolerated    Diet: regular diet      Discharge Medications:     Current Discharge Medication List           Details   levETIRAcetam (KEPPRA) 500 MG tablet Take 2 tablets by mouth 2 times daily  Qty: 60 tablet, Refills: 1      ondansetron (ZOFRAN-ODT) 4 MG disintegrating tablet Take 1 tablet by mouth every 8 hours as needed for Nausea or Vomiting  Qty: 30 tablet, Refills: 0      amLODIPine (NORVASC) 5 MG tablet Take 1 tablet by mouth daily  Qty: 30 tablet, Refills: 1      dexamethasone (DECADRON) 4 MG tablet Take 1 tablet by mouth every 8 hours  Qty: 90 tablet, Refills: 0      pantoprazole (PROTONIX) 40 MG tablet Take 1 tablet by mouth every morning (before breakfast)  Qty: 30 tablet, Refills: 1      nicotine (NICODERM CQ) 21 MG/24HR Place 1 patch onto the skin daily for 14 days  Qty: 14 patch, Refills: 0      nicotine (NICODERM CQ) 14 MG/24HR Place 1 patch onto the skin daily for 14 days  Qty: 14 patch, Refills: 0      nicotine (NICODERM CQ) 7 MG/24HR Place 1 patch onto the skin daily for 14 days  Qty: 14 patch, Refills: 0              Details   cyclobenzaprine (FLEXERIL) 10 MG tablet TAKE 1 TABLET BY MOUTH THREE TIMES DAILY      atenolol (TENORMIN) 100 MG tablet TK 1 T PO D      atorvastatin (LIPITOR) 40 MG tablet TAKE 1 TABLET EVERY DAY  Qty: 90 tablet, Refills: 0    Associated Diagnoses: Hyperlipidemia             Time Spent on discharge is more than 30 minutes in the examination, evaluation, counseling and review of medications and discharge plan. Signed:    Brendan Torrez MD   5/12/2022      Thank you Orion Colin MD for the opportunity to be involved in this patient's care. If you have any questions or concerns please feel free to contact me at [phone].     This chart was likely completed using voice recognition technology and may contain unintended grammatical , phraseology,and/or punctuation errors

## 2022-05-12 NOTE — ANESTHESIA POSTPROCEDURE EVALUATION
Department of Anesthesiology  Postprocedure Note    Patient: Zora Bean  MRN: [id number]  [place]: 1965  Date of evaluation: 5/12/2022  Time:  8:44 AM     Procedure Summary     Date: 05/12/22 Room / Location: [place] [address] / [facility]    Anesthesia Start: 2664 Anesthesia Stop: 2981    Procedure: BRONCHOSCOPY BIOPSY BRONCHUS (N/A ) Diagnosis:       Mediastinal lymphadenopathy      (Mediastinal lymphadenopathy [R59.0])    Surgeons: Johnnie Haji MD Responsible Provider: Roselyn Mark MD    Anesthesia Type: general ASA Status: 3          Anesthesia Type: No value filed. Sinai Phase I: Sinai Score: 10    Sinai Phase II:      Last vitals: Reviewed and per EMR flowsheets.        Anesthesia Post Evaluation    Patient location during evaluation: PACU  Level of consciousness: awake  Complications: no  Multimodal analgesia pain management approach

## 2022-05-12 NOTE — PROGRESS NOTES
NEUROSURGERY  PROGRESS NOTE    Patient Name: Carolina Cordero YOB: 1965   Sex: Female Age: 60 yrs     Medical Record Number: [id number] Acct Number: [de-identified]   Room Number: Endo Pool/NONE Hospital Day: Hospital Day: 3     Subjective: Pt has no new c/o    Objective:    VITAL SIGNS   BP (!) 172/112   Pulse 58   Temp 97.4 °F (36.3 °C) (Temporal)   Resp 18   Ht 5' 5\" (1.651 m)   Wt 139 lb 15.9 oz (63.5 kg)   SpO2 96%   BMI 23.30 kg/m²    Height Height: 5' 5\" (165.1 cm)   Weight Weight: 139 lb 15.9 oz (63.5 kg)        Allergies No Known Allergies   NPO Status Diet NPO Exceptions are: Sips of Water with Meds   Isolation No active isolations     LABS   Basic Metabolic Profile Recent Labs     05/09/22  1910 05/11/22  0621 05/12/22  0451    135* 137   CL 98* 99 99   CO2 17* 16* 20*   BUN 12 12 19   CREATININE 0.9 0.7 0.8   GLUCOSE 115* 108* 123*   MG  --  1.90 1.90      Complete Blood Count Recent Labs     05/09/22  1815 05/11/22  0621 05/12/22  0451   WBC 8.3 7.0 7.1   RBC 4.76 4.75 4.85      Coagulation Studies Recent Labs     05/09/22 1815   INR 0.98        MEDICATIONS   Inpatient Medications     dexamethasone, 4 mg, Oral, 3 times per day    amLODIPine, 5 mg, Oral, Daily    atorvastatin, 40 mg, Oral, Daily    pantoprazole, 40 mg, IntraVENous, Daily    levetiracetam, 1,000 mg, IntraVENous, Q12H    atenolol, 100 mg, Oral, Daily   Infusions    dextrose        Antibiotics   Recent Abx Admin      No antibiotic orders with administrations found.                  Neurologic Exam:  Mental status: awake and alert and oriented x4    Cranial Nerves:  II: Visual acuity not tested, visual fields full denies new visual changes / diplopia  III, IV, VI: PERRL, 3 mm bilaterally, EOMI, no nystagmus noted  V: Facial sensation intact bilaterally to touch  VII: Face symmetric  VIII: Hearing intact bilaterally to spoken voice  IX: Palate movement equal bilaterally  XI: Shoulder shrug equal bilaterally  XII: Tongue midline      Musculoskeletal:   Gait: Not tested   Tone: normal  Sensory: intact to all extremities  Motor strength:    Right  Left    Right  Left    Deltoid  5 5  Hip Flex  5 5   Biceps  5 5  Knee Extensors  5 5   Triceps  5 5  Knee Flexors  5 5   Wrist Ext  5 5  Ankle Dorsiflex. 5 5   Wrist Flex  5 5  Ankle Plantarflex. 5 5   Handgrip  5 5  Ext Luis E Longus  5 5   Thumb Ext  5 5           Respiratory:  Unlabored respiratory pattern    Abdomen:   Soft, ND       Cardiovascular:  Warm, well perfused    Assessment   Patient is a 57 y. o. female w/ multiple brain mets      Plan:  1. No  neurosurgical intervention indicated  2. Neurologic exams frequency: Q4  3. Cerebral edema:  - Keep Na within normal limits  - Decadron 4 mg Q8H per rad onc  - Keep HOB >30 degrees  4. Seizure prophylaxis: Keppra 1000mg BID continue  5. GI Prophylaxis: Pepcid  6. DVT Prophylaxis: SCD's  7. PT/OT consulted, appreciate recs  8. pulm to do lung biopsy  9. We will sign off. Pt will have Josse Greer    Patient was seen with Dr. Grace Hines who agrees with above assessment and plan. Electronically signed by:  JACQUIE Harrell - CNP, 5/12/2022 9:35 AM   Neurosurgery Nurse Practitioner  [phone]

## 2022-05-12 NOTE — PLAN OF CARE
Problem: Discharge Planning  Goal: Discharge to home or other facility with appropriate resources  Outcome: Progressing     Problem: Safety - Adult  Goal: Free from fall injury  Outcome: Progressing     Problem: ABCDS Injury Assessment  Goal: Absence of physical injury  Outcome: Progressing     Problem: Neurosensory - Adult  Goal: Achieves stable or improved neurological status  Outcome: Progressing  Goal: Absence of seizures  Outcome: Progressing  Goal: Remains free of injury related to seizures activity  Outcome: Progressing  Goal: Achieves maximal functionality and self care  Outcome: Progressing

## 2022-05-12 NOTE — CARE COORDINATION
Case Management Assessment            Discharge Note                    Date / Time of Note: 5/12/2022 1:49 PM                  Discharge Note Completed by: Mary Rueda RN    Patient Name: Jose Isidro   YOB: 1965  Diagnosis: Intraparenchymal hemorrhage of brain [facility]) [I61.9]   Date / Time: 5/10/2022  2:44 AM    Current PCP: Jessie Sadler MD    Advance Directives:  Code Status: Full Code    Financial:  Payor: Will Hanny / [facility] / Product Type: *No Product type* /      Pharmacy:    [address] [facility], [facility] [address]  [address] [facility] [address]  Phone: [phone] Fax: [phone]    [id number] - [place], [address] [phone]  [address] [facility] [address]  Phone: [phone] Fax: [phone]      Does patient want to participate in local refill/ meds to beds program?: Yes    Meds To Beds General Rules:  1. Can ONLY be done Monday- Friday between 8:30am-5pm  2. Prescription(s) must be in pharmacy by 3pm to be filled same day  3. Copy of patient's insurance/ prescription drug card and patient face sheet must be sent along with the prescription(s)  4. Cost of Rx cannot be added to hospital bill. If financial assistance is needed, please contact unit  or ;  or  CANNOT provide pharmacy voucher for patients co-pays  5.  Patients can then  the prescription on their way out of the hospital at discharge, or pharmacy can deliver to the bedside if staff is available. (payment due at time of pick-up or delivery - cash, check, or card accepted)     Able to afford home medications/ co-pay costs: Yes    ADLS:  Current PT AM-PAC Score: 18 /24  Current OT AM-PAC Score: 18 /24      DISCHARGE Disposition: home    Transportation:  Transportation PLAN for discharge: family   Mode of Transport: Private Car    The Patient and/or patient representative Mi Mcgee and her family were provided with a choice of provider and agrees with the discharge plan Yes    Freedom of choice list was provided with basic dialogue that supports the patient's individualized plan of care/goals and shares the quality data associated with the providers.  Yes    Lb Ji RN  [facility]  Case Management Department  [phone]

## 2022-05-12 NOTE — CONSULTS
Clinical Pharmacy Consult Note    60 y.o. female admitted with intraparenchymal hemorrhage. Pharmacy has been asked by Dr. Megan Solorio to adjust all drips to normal saline as appropriate based on compatibility to avoid fluid shifts since D5 is osmotically active. The following intermittent IV drips/infusions have been adjusted to saline:  Keppra (already in NS)      Please be aware that patient has D5W ordered as part of hypoglycemia orderset. As patient is not being treated for pituitary tumor resection or requiring hypertonic saline (defined as >0.9% NaCl), pharmacy will sign off of IV review consult, per protocol. Please call with questions.   Alicia Kapadia PharmD., BCPS   5/12/2022 9:15 AM  Wireless: [phone]

## 2022-05-12 NOTE — OP NOTE
[facility] [address]                                OPERATIVE REPORT    PATIENT NAME: Rima Brito                     :        1965  MED REC NO:   [id number]                          ROOM:       [id number]  ACCOUNT NO:   [de-identified]                           ADMIT DATE: 05/10/2022  PROVIDER:     Karime Khan MD    DATE OF PROCEDURE:  2022    SURGEON:  Karime Khan MD    PROCEDURE:  Bronchoscopy with endobronchial forceps biopsy. PREOPERATIVE DIAGNOSES:  Left upper lobe mass lesion with mediastinal  lymphadenopathy, suspect bronchogenic carcinoma. POSTOPERATIVE DIAGNOSES:  Left upper lobe mass lesion with mediastinal  lymphadenopathy, suspect bronchogenic carcinoma. ANESTHESIA:  General.    ASA score II, Mallampati score 1. DESCRIPTION OF THE PROCEDURE:  The patient identification and procedure  were confirmed during the time-out process. General anesthesia was  induced, and the patient was intubated per Anesthesia. The bronchoscope was passed via adapter through the endotracheal tube,  which was positioned adequately above the main sotero. The distal  trachea and main sotero were normal in appearance. The right  endobronchial anatomy was normal to the segmental level. Bronchial  mucosa on the right was normal.    Blood was seen to track up the left mainstem bronchus which was  otherwise normal in appearance. The left lower lobe segments were  identified and were normal in appearance. In the left upper lobe, the  lingula was patent. There was an obstructing mass in the upper division apical segment  bronchus. The anterior segment bronchus was patent. There was moderate  bleeding seen from the mass lesion. Epinephrine 1:10,000 solution 3 mL was applied topically to the mass  lesion. Forceps biopsies were then obtained from this endobronchial  tumor.   Bleeding was additionally controlled with use of ice cold saline  washing. After multiple biopsies, proximal obstruction of the airway  was relieved. The mucosa in the immediately visible airway distal to  that point was quite abnormal, with evident tumor breaking through the  mucosa. There was adequate hemostasis at the end of the procedure. Secretions  were cleared from the airways as the bronchoscope was withdrawn. The  patient tolerated the procedure well. EBL was less than 10 mL.         Calin Beck MD    D: 05/12/2022 8:40:05       T: 05/12/2022 8:42:50     [id number]  Job#: [id number]     Doc#: [id number]    CC:  MD Gabriela Duckworth MD

## 2022-05-12 NOTE — ANESTHESIA PRE PROCEDURE
Department of Anesthesiology  Preprocedure Note       Name:  Tere Beach   Age:  60 y.o.  :  1965                                          MRN:  [id number]         Date:  2022      Surgeon: Juan Pablo Bejarano):  Luci Quan MD    Procedure: Procedure(s):  BRONCHOSCOPY ENDOBRONCHIAL ULTRASOUND    Medications prior to admission:   Prior to Admission medications    Medication Sig Start Date End Date Taking?  Authorizing Provider   ibuprofen (ADVIL;MOTRIN) 200 MG tablet Take 400 mg by mouth every 6 hours as needed for Pain   Yes Historical Provider, MD   cyclobenzaprine (FLEXERIL) 10 MG tablet TAKE 1 TABLET BY MOUTH THREE TIMES DAILY 22   Historical Provider, MD   atenolol (TENORMIN) 100 MG tablet TK 1 T PO D 20   Historical Provider, MD   atorvastatin (LIPITOR) 40 MG tablet TAKE 1 TABLET EVERY DAY 4/15/15   Shay Morven, APRN - CNP       Current medications:    Current Facility-Administered Medications   Medication Dose Route Frequency Provider Last Rate Last Admin    amLODIPine (NORVASC) tablet 5 mg  5 mg Oral Daily Shivani Jalloh MD   5 mg at 22 0844    atorvastatin (LIPITOR) tablet 40 mg  40 mg Oral Daily Shivani Jalloh MD   40 mg at 22 5937    acetaminophen (TYLENOL) tablet 650 mg  650 mg Oral Q4H PRN Shivani Jalloh MD        ondansetron (ZOFRAN-ODT) disintegrating tablet 4 mg  4 mg Oral Q8H PRN Shivani Jalloh MD        Or    ondansetron [facility]) injection 4 mg  4 mg IntraVENous Q6H PRN Shivani Jalloh MD        glucose chewable tablet 16 g  4 tablet Oral PRN Shivani Jalloh MD        dextrose bolus 10% 125 mL  125 mL IntraVENous PRN Shivani Jalloh MD        Or    dextrose bolus 10% 250 mL  250 mL IntraVENous PRN Shivani Jalloh MD        glucagon (rDNA) injection 1 mg  1 mg IntraMUSCular PRN Shivani Jalloh MD        dextrose 5 % solution  100 mL/hr IntraVENous PRN Shivani Jalloh MD        dexamethasone (DECADRON) tablet 4 mg  4 mg Oral 4 times per day Shivani Jalloh MD   4 mg at 22 0545  labetalol (NORMODYNE;TRANDATE) injection 10 mg  10 mg IntraVENous Q4H PRN Talib Logan MD   10 mg at 05/11/22 0041    pantoprazole (PROTONIX) injection 40 mg  40 mg IntraVENous Daily Talib Logan MD   40 mg at 05/11/22 1705    levETIRAcetam (KEPPRA) 1,000 mg in sodium chloride 0.9 % 100 mL IVPB  1,000 mg IntraVENous Q12H Talib Logan MD   Stopped at 05/11/22 2319    atenolol (TENORMIN) tablet 100 mg  100 mg Oral Daily Talib Logan MD   100 mg at 05/11/22 5741    perflutren lipid microspheres (DEFINITY) injection 1.65 mg  1.5 mL IntraVENous ONCE PRN Talib Logan MD           Allergies:  No Known Allergies    Problem List:    Patient Active Problem List   Diagnosis Code    HTN (hypertension), benign I10    Vitamin D deficiency E55.9    Vertigo R42    Dyslipidemia E78.5    [facility]       Past Medical History:        Diagnosis Date    Hyperlipidemia     Hypertension     ICH (intracerebral hemorrhage) 05/10/2022    Vitamin D deficiency        Past Surgical History:        Procedure Laterality Date    ANKLE SURGERY Left     COLONOSCOPY         Social History:    Social History     Tobacco Use    Smoking status: Current Every Day Smoker     Packs/day: 1.00     Years: 30.00     Pack years: 30.00     Types: Cigarettes    Smokeless tobacco: Never Used    Tobacco comment: pt states she has chantix at home hasnt started yet   Substance Use Topics    Alcohol use:  Yes     Alcohol/week: 2.0 - 4.0 standard drinks     Types: 2 - 4 Shots of liquor per week     Comment: vodka daily                                Ready to quit: Not Answered  Counseling given: Not Answered  Comment: pt states she has chantix at home hasnt started yet      Vital Signs (Current):   Vitals:    05/11/22 2000 05/11/22 2100 05/11/22 2320 05/12/22 0340   BP:  (!) 144/81 (!) 145/86 (!) 151/73   Pulse: 67 68 62 67   Resp:  24 21 24   Temp:  98.1 °F (36.7 °C) 98.2 °F (36.8 °C) 97.5 °F (36.4 °C) TempSrc:  Oral Oral Oral   SpO2:  92% 93% 97%   Weight:       Height:                                                  BP Readings from Last 3 Encounters:   05/12/22 (!) 151/73   05/10/22 (!) 141/93   06/09/20 (!) 147/96       NPO Status:                                                                                 BMI:   Wt Readings from Last 3 Encounters:   05/10/22 139 lb 15.9 oz (63.5 kg)   05/10/22 153 lb 10.6 oz (69.7 kg)   05/09/22 156 lb (70.8 kg)     Body mass index is 23.3 kg/m².     CBC:   Lab Results   Component Value Date    WBC 7.1 05/12/2022    RBC 4.85 05/12/2022    HGB 16.8 05/12/2022    HCT 51.3 05/12/2022    .9 05/12/2022    RDW 14.3 05/12/2022     05/12/2022       CMP:   Lab Results   Component Value Date     05/12/2022    K 4.6 05/12/2022    K 4.9 05/09/2022    CL 99 05/12/2022    CO2 20 05/12/2022    BUN 19 05/12/2022    CREATININE 0.8 05/12/2022    GFRAA >60 05/12/2022    GFRAA >60 01/04/2011    AGRATIO 1.4 05/09/2022    LABGLOM >60 05/12/2022    GLUCOSE 123 05/12/2022    PROT 7.5 05/09/2022    PROT 8.6 10/29/2010    CALCIUM 10.1 05/12/2022    BILITOT 0.6 05/09/2022    ALKPHOS 96 05/09/2022    AST 52 05/09/2022    ALT 28 05/09/2022       POC Tests:   Recent Labs     05/11/22  1122   POCGLU 106*       Coags:   Lab Results   Component Value Date    PROTIME 11.1 05/09/2022    INR 0.98 05/09/2022    APTT 44.5 05/09/2022       HCG (If Applicable):   Lab Results   Component Value Date    PREGTESTUR Neg 03/22/2010        ABGs:   Lab Results   Component Value Date    PHART 7.361 05/09/2022    PO2ART 141.0 05/09/2022    YCX3VAY 39.2 05/09/2022    GRC7WRK 22.2 05/09/2022    BEART -3.0 05/09/2022    W1EZNZPP 99.9 05/09/2022        Type & Screen (If Applicable):  No results found for: LABABO, LABRH    Drug/Infectious Status (If Applicable):  No results found for: HIV, HEPCAB    COVID-19 Screening (If Applicable): No results found for: COVID19        Anesthesia Evaluation    Airway: Mallampati: II  TM distance: >3 FB   Neck ROM: full  Mouth opening: > = 3 FB Dental:          Pulmonary:                              Cardiovascular:    (+) hypertension:,         Rhythm: regular  Rate: normal                    Neuro/Psych:   (+) seizures:,             GI/Hepatic/Renal:             Endo/Other:    (+) malignancy/cancer. Abdominal:             Vascular: Other Findings:           Anesthesia Plan      general     ASA 3       Induction: intravenous. Anesthetic plan and risks discussed with patient. Plan discussed with CRNA.                   Kong Arndt MD   5/12/2022

## 2022-05-12 NOTE — PROGRESS NOTES
Physical Therapy  Facility/Department: [facility] ICU  Daily Treatment Note  NAME: Sima Bennett  : 1965  MRN: [id number]    Date of Service: 2022    Discharge Recommendations:    Sima Bennett scored a 20/24 on the AM-PAC short mobility form. Current research shows that an AM-PAC score of 18 or greater is typically associated with a discharge to the patient's home setting. Based on the patient's AM-PAC score and their current functional mobility deficits, it is recommended that the patient have 2-3 sessions per week of Physical Therapy at d/c to increase the patient's independence. At this time, this patient demonstrates the endurance and safety to discharge home with home services and a follow up treatment frequency of 2-3x/wk. Please see assessment section for further patient specific details. If patient discharges prior to next session this note will serve as a discharge summary. Please see below for the latest assessment towards goals. PT Equipment Recommendations  Equipment Needed: No (pt refused AD)    Patient Diagnosis(es): The encounter diagnosis was Mediastinal lymphadenopathy. Assessment   Assessment: Pt requires SUPV for bed mobility, SBA for transfers, & min A for ambulation without device. Recommend 24-hr assistance upon D/C. Recommend use of gait belt & physical assist for all ambulation & stairs. Discussed with sister who verbalized understanding. Will follow per plan of care. Activity Tolerance: Patient tolerated treatment well  Equipment Needed: No (pt refused AD)     Plan    Plan  Plan: 5-7 times per week  Current Treatment Recommendations: Strengthening;Balance training;Functional mobility training;Transfer training;Patient/Caregiver education & training; Therapeutic activities;Gait training     Restrictions  Position Activity Restriction  Other position/activity restrictions: up with assist     Subjective    Subjective  Subjective: Pt supine in bed & agreeable to PT.  Orientation  Overall Orientation Status: Within Functional Limits  Cognition  Overall Cognitive Status: Exceptions  Arousal/Alertness: Appropriate responses to stimuli  Following Commands: Follows one step commands with repetition; Follows one step commands with increased time  Attention Span: Attends with cues to redirect  Memory: Decreased recall of recent events;Decreased short term memory  Safety Judgement: Decreased awareness of need for safety;Decreased awareness of need for assistance  Problem Solving: Assistance required to generate solutions;Assistance required to implement solutions;Decreased awareness of errors  Insights: Decreased awareness of deficits  Initiation: Does not require cues  Sequencing: Requires cues for some     Objective      Bed Mobility Training  Bed Mobility Training: Yes  Supine to Sit: Supervision (HOB elevated)  Scooting: Supervision (seated)  Balance  Sitting: Intact  Standing: With support  Transfer Training  Transfer Training: Yes  Sit to Stand: Stand-by assistance (from bed, chair)  Stand to Sit: Stand-by assistance  Gait Training  Gait Training: Yes (pt amb 400 ft with min A. pt demo variable pace, decreased coordiation, narrow SEBAS with scissoring at times. pt had several LOB requiring min A to recover. pt declined trying RW or cane. \"No way, those are for old people. \")  Gait  Overall Level of Assistance: Minimum assistance (refusing AD, January, multiple minor LOB, uncoordinated movements, sciossoring of feet)  Base of Support: Narrowed  Gait Abnormalities: Ataxic;Scissoring  Rail Use: Left  Stairs - Level of Assistance: Contact-guard assistance  Number of Stairs Trained: 12 (step-to pattern)           Safety Devices  Type of Devices: Call light within reach;Gait belt;Nurse notified; Patient at risk for falls; All fall risk precautions in place; Left in chair;Chair alarm in place       Goals  Long Term Goals  Time Frame for Long term goals : discharge  Long term goal 1: Pt will transfer supine <--> sit with supervision  Long term goal 2: Pt will transfer sit <--> stand with supervision  Long term goal 3: Pt will amb 150 ft with supervision  Long term goal 4: Pt will negotiate 5 steps with rail and CGA  Patient Goals   Patient goals : return home with family    Education  Patient Education  Education Given To: Patient; Family  Education Provided: Plan of Care;Precautions; Fall Prevention Strategies  Education Outcome: Verbalized understanding;Continued education needed    Therapy Time   Individual Concurrent Group Co-treatment   Time In 1342         Time Out 1407         Minutes 25                 Carolynn De Leon, PT

## 2022-05-12 NOTE — PROGRESS NOTES
Occupational Therapy  Facility/Department: [facility] ICU  Occupational Therapy Treatment    Name: Miguel Angel Méndez  : 1965  MRN: [id number]  Date of Service: 2022    Discharge Recommendations: Miguel Angel Ménedz scored a 18/24 on the AM-PAC ADL Inpatient form. Current research shows that an AM-PAC score of 18 or greater is typically associated with a discharge to the patient's home setting. Based on the patient's AM-PAC score, and their current ADL deficits, it is recommended that the patient have 2-3 sessions per week of Occupational Therapy at d/c to increase the patient's independence. At this time, this patient demonstrates the endurance and safety to discharge home with (home vs OP services) and a follow up treatment frequency of 2-3x/wk. Please see assessment section for further patient specific details. If patient discharges prior to next session this note will serve as a discharge summary. Please see below for the latest assessment towards goals. OT Equipment Recommendations  ADL Assistive Devices: Shower Chair with back       Patient Diagnosis(es): The encounter diagnosis was Mediastinal lymphadenopathy. Past Medical History:  has a past medical history of Hyperlipidemia, Hypertension, ICH (intracerebral hemorrhage), and Vitamin D deficiency. Past Surgical History:  has a past surgical history that includes Ankle surgery (Left); Colonoscopy; and bronchoscopy (N/A, 2022). Treatment Diagnosis: impaired mobility, transfers, ADL      Assessment   Performance deficits / Impairments: Decreased functional mobility ; Decreased ADL status; Decreased endurance;Decreased safe awareness;Decreased cognition;Decreased balance  Assessment: Pt from home with , found down at home, hemorrhage. Pt IND pta. Pt cont to demo decreased cognition and safety awareness this date however improved alertness from prev session. Mobility and transfers with CGA to January.  Cont to demo decreased balance and coordination. Declining using AD. Would benefit from cont skilled OT while in acute care. Requires 24hr hands on assist and spvn at dc 2/2 impaired safety awareness and balance. Conversation with pt sister about importance of 24hr hands on assist.  Treatment Diagnosis: impaired mobility, transfers, ADL  REQUIRES OT FOLLOW-UP: Yes  Activity Tolerance  Activity Tolerance: Patient Tolerated treatment well;Treatment limited secondary to decreased cognition  Activity Tolerance Comments: decreased safety awareness and insight        Plan   Plan  Times per Week: 5-7  Times per Day: Daily     Restrictions  Position Activity Restriction  Other position/activity restrictions: up with assist    Subjective   General  Chart Reviewed: Yes  Additional Pertinent Hx: 60 y.o. female, who was admitted for seizure and noted to have CT head with 3 cm hyperdense mass in left frontal lobe and 2.5 cm acute intraparenchymal hemorrhage. CT 5/10 1. Stable appearance of intra-axial left frontal lobe and left temporal lobe hemorrhage with vasogenic edema. 2. Patient status post seizure which may account for hemorrhage, posttraumatic, however, Magnetic resonance imaging without and with contrast is suggested to exclude underlying occult pathology. Referring Practitioner: Diana Garza MD  Diagnosis: intraparenchymal hemorrhage of brain  Subjective  Subjective:  In bed agreeable to session sister arriving for part of session     Social/Functional History  Social/Functional History  Lives With: Spouse  Type of Home: House  Home Layout: Two level,Bed/Bath upstairs,1/2 bath on main level  Home Access: Stairs to enter with rails  Entrance Stairs - Number of Steps: 5-6 x2 bilevel home, no steps from outside  Bathroom Shower/Tub: Tub/Shower unit  [employer]: Handicap height  Bathroom Equipment:  (none)  Home Equipment: Walker, standard  Has the patient had two or more falls in the past year or any fall with injury in the past year?: Yes (reporting falls in the past year 2/2 vestibular balance issues but becoming more IND in the last 6 months)  ADL Assistance: Independent  Homemaking Assistance: Independent  Ambulation Assistance: Independent  Transfer Assistance: Independent  Active : Yes  Mode of Transportation: Car  Occupation: Full time employment  Type of Occupation: works for [employer]  Additional Comments: Pt family assisting with PLOF pt reporting inconsistent information family correcting at times. Pt has had falls in the past - received PT at OP balance disorder clinic. Objective   Pulse: 58  Heart Rate Source: Monitor  BP: (!) 172/112  BP Location: Right upper arm  BP Method: Automatic  Patient Position: Semi fowlers  MAP (Calculated): 132  Resp: 18  SpO2: 96 %  O2 Device: None (Room air)             Safety Devices  Type of Devices: Call light within reach;Gait belt;Nurse notified; Patient at risk for falls; All fall risk precautions in place; Left in chair;Chair alarm in place  Balance  Sitting: Intact  Standing: With support  Gait  Overall Level of Assistance: Minimum assistance (refusing AD, January, multiple minor LOB, uncoordinated movements, sciossoring of feet)  Base of Support: Narrowed  Gait Abnormalities: Ataxic;Scissoring        ADL  Feeding: Independent  Grooming: Stand by assistance  LE Dressing: Stand by assistance (donning socks and shoes)  Toileting: Stand by assistance        Bed mobility  Supine to Sit: Supervision  Bed Mobility Comments: left in chair end of session  Transfers  Sit to stand: Contact guard assistance;Stand by assistance  Stand to sit: Contact guard assistance;Stand by assistance  Transfer Comments: cues for safety/impulse     Cognition  Overall Cognitive Status: Exceptions  Arousal/Alertness: Appropriate responses to stimuli  Following Commands: Follows one step commands with repetition; Follows one step commands with increased time  Attention Span: Attends with cues to redirect  Memory: Decreased recall of recent events;Decreased short term memory  Safety Judgement: Decreased awareness of need for safety;Decreased awareness of need for assistance  Problem Solving: Assistance required to generate solutions;Assistance required to implement solutions;Decreased awareness of errors  Insights: Decreased awareness of deficits  Initiation: Does not require cues  Sequencing: Requires cues for some                  Education Given To: Patient; Family  Education Provided: Role of Therapy;Plan of Care;ADL Adaptive Strategies;Transfer Training  Education Method: Demonstration;Verbal  Barriers to Learning: Cognition  Education Outcome: Continued education needed                                AM-PAC Score        AM-[facility] Inpatient Daily Activity Raw Score: 18 (05/12/22 1516)  AM-PAC Inpatient ADL T-Scale Score : 38.66 (05/12/22 1516)  ADL Inpatient CMS 0-100% Score: 46.65 (05/12/22 1516)  ADL Inpatient CMS G-Code Modifier : CK (05/12/22 1516)    Goals  Short Term Goals  Time Frame for Short term goals: dc  Short Term Goal 1: supine to sit IND  Short Term Goal 2: toileting SPVN  Short Term Goal 3: FX mobility SPVN  Short Term Goal 4: UB/LB dressing SPVN  Short Term Goal 5: grooming SPVN in stance at sink       Therapy Time   Individual Concurrent Group Co-treatment   Time In 1340         Time Out 1406         Minutes 26              Timed Code Treatment Minutes:   26    Total Treatment Minutes:  [address] [facility],

## 2022-05-16 ENCOUNTER — ANESTHESIA EVENT (OUTPATIENT)
Dept: RADIATION ONCOLOGY | Age: 57
End: 2022-05-16

## 2022-05-16 ENCOUNTER — HOSPITAL ENCOUNTER (OUTPATIENT)
Dept: MRI IMAGING | Age: 57
Discharge: HOME | End: 2022-05-16
Payer: COMMERCIAL

## 2022-05-16 ENCOUNTER — HOSPITAL ENCOUNTER (OUTPATIENT)
Dept: RADIATION ONCOLOGY | Age: 57
Discharge: HOME | End: 2022-05-16
Payer: COMMERCIAL

## 2022-05-16 ENCOUNTER — ANESTHESIA (OUTPATIENT)
Dept: RADIATION ONCOLOGY | Age: 57
End: 2022-05-16

## 2022-05-16 VITALS
OXYGEN SATURATION: 93 % | SYSTOLIC BLOOD PRESSURE: 151 MMHG | RESPIRATION RATE: 18 BRPM | HEIGHT: 65 IN | DIASTOLIC BLOOD PRESSURE: 86 MMHG | HEART RATE: 67 BPM | WEIGHT: 144.3 LBS | BODY MASS INDEX: 24.04 KG/M2

## 2022-05-16 PROCEDURE — 2500000003 HC RX 250 WO HCPCS

## 2022-05-16 PROCEDURE — 77295 3-D RADIOTHERAPY PLAN: CPT

## 2022-05-16 PROCEDURE — 6360000004 HC RX CONTRAST MEDICATION: Performed by: NEUROLOGICAL SURGERY

## 2022-05-16 PROCEDURE — 77300 RADIATION THERAPY DOSE PLAN: CPT

## 2022-05-16 PROCEDURE — 7100000011 HC PHASE II RECOVERY - ADDTL 15 MIN

## 2022-05-16 PROCEDURE — 77334 RADIATION TREATMENT AID(S): CPT

## 2022-05-16 PROCEDURE — 7100000010 HC PHASE II RECOVERY - FIRST 15 MIN

## 2022-05-16 PROCEDURE — 77373 STRTCTC BDY RAD THER TX DLVR: CPT

## 2022-05-16 PROCEDURE — 2500000003 HC RX 250 WO HCPCS: Performed by: NEUROLOGICAL SURGERY

## 2022-05-16 PROCEDURE — 3700000000 HC ANESTHESIA ATTENDED CARE

## 2022-05-16 PROCEDURE — 6360000002 HC RX W HCPCS: Performed by: NEUROLOGICAL SURGERY

## 2022-05-16 PROCEDURE — A9579 GAD-BASE MR CONTRAST NOS,1ML: HCPCS | Performed by: NEUROLOGICAL SURGERY

## 2022-05-16 PROCEDURE — 6360000002 HC RX W HCPCS

## 2022-05-16 PROCEDURE — 70553 MRI BRAIN STEM W/O & W/DYE: CPT

## 2022-05-16 RX ORDER — SODIUM BICARBONATE 42 MG/ML
1.5 INJECTION, SOLUTION INTRAVENOUS ONCE
Status: COMPLETED | OUTPATIENT
Start: 2022-05-16 | End: 2022-05-16

## 2022-05-16 RX ORDER — SODIUM CHLORIDE 9 MG/ML
INJECTION, SOLUTION INTRAVENOUS CONTINUOUS PRN
Status: DISCONTINUED | OUTPATIENT
Start: 2022-05-16 | End: 2022-05-16 | Stop reason: SDUPTHER

## 2022-05-16 RX ORDER — BACITRACIN, NEOMYCIN, POLYMYXIN B 400; 3.5; 5 [USP'U]/G; MG/G; [USP'U]/G
OINTMENT TOPICAL 2 TIMES DAILY
Status: DISCONTINUED | OUTPATIENT
Start: 2022-05-16 | End: 2022-05-16

## 2022-05-16 RX ORDER — DEXAMETHASONE SODIUM PHOSPHATE 4 MG/ML
4 INJECTION, SOLUTION INTRA-ARTICULAR; INTRALESIONAL; INTRAMUSCULAR; INTRAVENOUS; SOFT TISSUE ONCE
Status: COMPLETED | OUTPATIENT
Start: 2022-05-16 | End: 2022-05-16

## 2022-05-16 RX ORDER — PROPOFOL 10 MG/ML
INJECTION, EMULSION INTRAVENOUS PRN
Status: DISCONTINUED | OUTPATIENT
Start: 2022-05-16 | End: 2022-05-16 | Stop reason: SDUPTHER

## 2022-05-16 RX ORDER — BUPIVACAINE HYDROCHLORIDE 5 MG/ML
30 INJECTION, SOLUTION EPIDURAL; INTRACAUDAL ONCE
Status: COMPLETED | OUTPATIENT
Start: 2022-05-16 | End: 2022-05-16

## 2022-05-16 RX ORDER — LIDOCAINE HYDROCHLORIDE 20 MG/ML
INJECTION, SOLUTION INFILTRATION; PERINEURAL PRN
Status: DISCONTINUED | OUTPATIENT
Start: 2022-05-16 | End: 2022-05-16 | Stop reason: SDUPTHER

## 2022-05-16 RX ORDER — LIDOCAINE HYDROCHLORIDE 10 MG/ML
30 INJECTION, SOLUTION EPIDURAL; INFILTRATION; INTRACAUDAL; PERINEURAL ONCE
Status: COMPLETED | OUTPATIENT
Start: 2022-05-16 | End: 2022-05-16

## 2022-05-16 RX ORDER — BACITRACIN, NEOMYCIN, POLYMYXIN B 400; 3.5; 5 [USP'U]/G; MG/G; [USP'U]/G
OINTMENT TOPICAL ONCE
Status: DISCONTINUED | OUTPATIENT
Start: 2022-05-16 | End: 2022-05-17 | Stop reason: HOSPADM

## 2022-05-16 RX ORDER — ONDANSETRON 2 MG/ML
4 INJECTION INTRAMUSCULAR; INTRAVENOUS ONCE
Status: DISCONTINUED | OUTPATIENT
Start: 2022-05-16 | End: 2022-05-17 | Stop reason: HOSPADM

## 2022-05-16 RX ORDER — 0.9 % SODIUM CHLORIDE 0.9 %
500 INTRAVENOUS SOLUTION INTRAVENOUS ONCE
Status: DISCONTINUED | OUTPATIENT
Start: 2022-05-16 | End: 2022-05-17 | Stop reason: HOSPADM

## 2022-05-16 RX ADMIN — SODIUM BICARBONATE 1.5 MEQ: 42 INJECTION, SOLUTION INTRAVENOUS at 08:20

## 2022-05-16 RX ADMIN — GADOTERIDOL 30 ML: 279.3 INJECTION, SOLUTION INTRAVENOUS at 09:38

## 2022-05-16 RX ADMIN — PROPOFOL 100 MG: 10 INJECTION, EMULSION INTRAVENOUS at 08:26

## 2022-05-16 RX ADMIN — DEXAMETHASONE SODIUM PHOSPHATE 4 MG: 4 INJECTION, SOLUTION INTRAMUSCULAR; INTRAVENOUS at 12:25

## 2022-05-16 RX ADMIN — PROPOFOL 20 MG: 10 INJECTION, EMULSION INTRAVENOUS at 08:28

## 2022-05-16 RX ADMIN — DEXAMETHASONE SODIUM PHOSPHATE 4 MG: 4 INJECTION, SOLUTION INTRAMUSCULAR; INTRAVENOUS at 08:20

## 2022-05-16 RX ADMIN — LIDOCAINE HYDROCHLORIDE 25 MG: 20 INJECTION, SOLUTION INFILTRATION; PERINEURAL at 08:33

## 2022-05-16 RX ADMIN — LIDOCAINE HYDROCHLORIDE 30 ML: 10 INJECTION, SOLUTION EPIDURAL; INFILTRATION; INTRACAUDAL; PERINEURAL at 08:25

## 2022-05-16 RX ADMIN — PROPOFOL 20 MG: 10 INJECTION, EMULSION INTRAVENOUS at 08:32

## 2022-05-16 RX ADMIN — SODIUM CHLORIDE: 9 INJECTION, SOLUTION INTRAVENOUS at 08:25

## 2022-05-16 RX ADMIN — LIDOCAINE HYDROCHLORIDE 50 MG: 20 INJECTION, SOLUTION INFILTRATION; PERINEURAL at 08:26

## 2022-05-16 RX ADMIN — BUPIVACAINE HYDROCHLORIDE 150 MG: 5 INJECTION, SOLUTION EPIDURAL; INTRACAUDAL; PERINEURAL at 08:25

## 2022-05-16 NOTE — ANESTHESIA POSTPROCEDURE EVALUATION
Department of Anesthesiology  Postprocedure Note    Patient: Vera Castañeda  MRN: [id number]  [place]: 1965  Date of evaluation: 5/16/2022  Time:  12:30 PM     Procedure Summary     Date: 05/16/22 [facility]    Anesthesia Start: 0825 Anesthesia Stop: Smyth Shells    Procedure: [facility] ANESTHESIA Diagnosis: Secondary malignant neoplasm of brain    Scheduled Providers: Tosha Rivera MD Responsible Provider: Tosha Rivera MD    Anesthesia Type: MAC ASA Status: 3          Anesthesia Type: No value filed. Sinai Phase I:      Sinai Phase II:      Last vitals: Reviewed and per EMR flowsheets.        Anesthesia Post Evaluation    Patient location during evaluation: PACU  Level of consciousness: awake  Complications: no  Multimodal analgesia pain management approach

## 2022-05-16 NOTE — H&P
The patient was seen and examined. There have been no changes in the patient's condition since the history and physical.    I reviewed the benefits, risks, and alternatives and the patient consents to the procedure. We will obtain a cone beam CT scan with markers to assess the frontal sinus. Madie Childers MD

## 2022-05-16 NOTE — ONCOLOGY
Gamma Knife Radiosurgery Procedure Note      Patient: Medardo Kinney  Date: 5/16/2022    Diagnosis:Multifocal brain Metastases    Procedure: Gamma Knife Based Stereotactic Radiosurgery [facility])    Description of procedure:  George Mcintyre was [facility] at 5:18 PM on 5/16/2022 by Dr. James Crain and myself and Dr. Saul Junior. Sammy Castellanos MD]    We reviewed her pathology report, MRI findings, goals and side efffects of treatment. All questions were addressed     Sedation and frame placement were completed by Dr. James Crain. A stereotactic MRI brain with double-dose contrast was then completed. All lesions were contoured as were normal structures. We did a cone beam CT prior to planned treatment, which was reviewed and approved prior to treatment. Dosimetry of Dania [address] is summarized as follows:   A total of 11 brain metastases were treated, 9 of which were treated with single fractionated radiosurgery, most received a dose of 20 Gy prescribed to the 70-90% isodose line. Two larger lesions received 1 of a planned 5 fraction regimen of fractionated radiosurgery, for a dose of 6 Gy of planned 30 Gy over 5 fractions. Dosimetry was reviewed by \"Dr. Felice Martinez MS (special physics consult requested) and myself. Treatment was then given successfully. The frame was removed without complication. The patient was discharged home in stable condition. She will return tomorrow for planned mask based radiosurgery treatment to the two larger lesions. She will followup with me and med/onc in the next few weeks to discuss definitive treatment to her thoracic disease, pending PET/CT. She will get a follow-up MRI brain in 2-3 months.       Sammy Castellanos MD

## 2022-05-16 NOTE — OP NOTE
[facility]  PATIENT NAME:   Lynda Palacios   MR #:  [id number]  YOB: 1965   ACCOUNT #:  [de-identified]  SURGEON:  Kristi Walters MD   ADMIT DATE:  5/16/2022  SERVICE:  Neurosurgery  DICTATED BY: Kristi Walters MD   SURGERY DATE:  5/16/2022           OPERATIVE REPORT       PREOPERATIVE DIAGNOSIS:     1. Multiple brain metastases (lung cancer)     POSTOPERATIVE DIAGNOSIS:     1. Same    PROCEDURE(S) PERFORMED:    1. Placement of stereotactic head frame   2. Gamma Knife radiosurgery for 11 brain metastases    NEUROSURGEON: Kristi Walters MD       RADIATION ONCOLOGIST: Vivian Espinoza MD    ANESTHESIA: Moderate sedation    COMPLICATIONS: None    INDICATIONS: This is a 57-year-old woman with newly-diagnosed NSCLC who initially presented with a seizure. MRI scan showed multiple brain metastases. We discussed various treatment options but ultimately recommended Gamma Knife radiosurgery. The patient was aware of the potential benefits in terms of tumor control and the possible risks including (but not limited to): pin site bleeding/swelling/infection, brain swelling, seizures, bleeding, new neurological symptoms, and/or radiation injury of the brain. PERFORMANCE STATUS: 90%    SYSTEMIC DISEASE: Newly-diagnosed    DETAILS OF PROCEDURE: The four pin sites were cleaned with Chloraprep and injected with a mixture of Lidocaine 1%, Marcaine 0.5%, and sodium bicarbonate. The stereotactic head frame was secured with titanium screws. The patient underwent a stereotactic MRI scan with contrast. These images were sent over the network to the [address] [place] [address]. The targets and critical structures were contoured. An optimal treatment plan was developed by the neurosurgeon, radiation oncologist, and medical physicist. All critical structure constraints were fulfilled.     The patient then underwent Gamma Knife radiosurgery using the following parameters:    Target Size Volume Shape Shots Dose Isodose    cm cc   Gy %   Left frontal z51.1 2.71 8.18 Oval 32 6 50   Left frontal z28.2 0.30 0.01 Round 1 20 85   Left parietal z65.1 0.21 0.01 Round 1 20 85   Left temporal z90.1 0.33 0.02 Round 1 20 80   Left cerebellar z105.1 0.62 0.08 Round 1 20 65   Left frontal z56.1 0.87 0.21 Round 1 18 71   Right parietal z42.1 0.41 0.03 Oval 2 20 62   Right frontal z70.1 0.40 0.03 Round 1 20 70   Left temporal z107.1 2.56 4.56 Oval 37 6 59   Right frontal z103.1 0.27 0.01 Round 1 20 85   Right frontal z48.1 0.28 0.01 Round 1 20 85     The left frontal and left temporal tumors will be treated with a total of 30 Gy over five fractions (frame: 6 Gy x 1, mask: 6 Gy x 4). The patient tolerated the procedure without difficulty and the stereotactic frame was removed uneventfully. The patient was instructed on pin site care and medications.     SPECIMENS REMOVED: None    ESTIMATED BLOOD LOSS: None    TOTAL TIME SPENT WITH PATIENT: In conformance with CMS regulations, I affirm that I was present for the entire neurosurgical portion of the procedure including stereotactic frame placement, target definition, development of the treatment plan, treatment delivery, and stereotactic frame removal.     Ted Mcghee MD

## 2022-05-16 NOTE — PROGRESS NOTES
9181  Patient arrived to [address] [place] [address] Way alert and oriented x 4 with spouse. Patient is neurologically intact. PIV established. Initial vitals WNL, and patient denies pain. Gissel Rasheed, RN     0700  Gamma Knife RN Pre-Procedure Checklist     1. Has the patient ever had any surgery on the head or neck? No    -If yes, is the surgery site marked? N/A    2. Has the patient ever received radiation treatment to the head or neck? No      -If yes, is the treatment summary and/or disk of treatment plan available? N/A      -If the patient has had previous Gamma Knife radiosurgery has the most recent imaging been reviewed in the Gamma Plan? N/A      3. Has the patient received chemotherapy or immunotherapy in the past month? No      -If yes, list the agent and date of last treatment. N/A    4. Is patient [de-identified] or older? No      -If yes, using all aluminum pins? N/A    5. List the procedure-specific medications taken by the patient this morning:   -Dexamethasone 4 mg   -Keppra 500 mg   -Protonix 40 mg    6. What is the patients GFR? 85    -For GFR 0-30 => no contrast at MRI    -For GFR 31-59 => single dose contrast at MRI    -For GFR >60 => double dose contrast at MRI    7. Does the patient require a pregnancy test per [address]?  no     -If yes, the result is: na    8. What is the patient's baseline CO2? 28  Odalis Haley, NICOLE        3567  Gamma Knife Frame Placement Time Out     1. Patient states name and birthdate correctly? Yes    2. Procedure listed on consent:  G-Frame placement and Gamma Knife radiosurgery for multiple brain metastases    3. Is this the correct procedure? Yes    4. Are the consents signed? Yes    5. Does the patient have only one benign target or lesion? No     -If yes, what side are we treating:  N/A     -Is the side marked for laterality? N/A    6. Have films been reviewed today? Yes    7. Has the interim History and Physical form been completed? yes    8.   Has the neurosurgeon reviewed the [facility] RN Pre-Procedure Checklist?  Yes    9. Does the patient require a pregnancy test per [address]? no     -If yes, the result was:  na    10. Are all present in agreement? Yes    Those present for time out:  Joanne Cortez, RN , Dr. Nicola Melendez, Olu Encarnacion CRNA    1992  Patient received MAC under the supervision of Dr. Chiqui Salazar and Olu Encarnacion CRNA. This RN was present throughout [place] [address] and assumed care from anesthesia for Phase II recovery. The patient is awake and breathing easily. Patient denies pain. See Flow Sheet for vitals and Sinai Score. Gissel Rasheed, NICOLE     1000  After G-frame placement, patient was taken to MRI by this RN where SpO2 and pulse were monitored and remained stable throughout. Patient tolerated the study well. Joanne Cortez RN     1247  Gamma Knife Radiation Delivery Time Out    1. Patient states name and birthdate correctly? Yes    2. Procedure listed on consent? Gamma Knife Stereotactic Radiosurgery,  for multiple brain metastases    3. Is this the correct procedure? Yes    4. Does the patient have only one benign target or lesion? No    -If yes, what side are we treating? N/A    -If yes, is the side marked for laterality? N/A    5. Has the final radiologist report been reviewed? yes    6. Has the patient received IV Dexamethasone prior to radiation delivery? yes    7. Does the patient require Keppra or Ativan prior to treatment delivery? N/A    8. Have the pin torques been rechecked? No    9. Is a CBCT required prior to treatment delivery? Yes    10. Are all present in agreement? Yes    11. Those present for time out:  Destini Dash MP, Dr. Estrella Brito, Dr. Jeri Kirby, RN       Patient brought to [facility] suite and placed on treatment couch. Sequential Compression Devices placed on BLE's per [address] VTE Protocol. AV monitoring in place and verified verbally with patient from console. Continuous SpO2 and pulse monitor visible and monitored by this RN. Anil Bellamy RN     7139  After Gamma Knife procedure, the G-frame was removed by NICOLE Chauhan and Agustina Sharpe RN and fixation pin sites were observed for bleeding. None was noted. Pin sites were cleaned with alcohol and povidone-iodine. Dr. Keny Ramírez then placed sutures at all four pin sites. Patient met Phase II discharge criteria. Baseline neurological status unchanged. See discharge Sinai score and vitals. Discharge instructions were reviewed with patient and spouse who verbalized understanding using teach back method. A written copy of the instructions was given. Pt has schedule for the rest of her treatments this week. Patient has follow up appointments scheduled. Patient was accompanied to transportation by this RN.     Anil Bellamy RN

## 2022-05-16 NOTE — ANESTHESIA PRE PROCEDURE
Department of Anesthesiology  Preprocedure Note       Name:  Medardo Kinney   Age:  60 y.o.  :  1965                                          MRN:  [id number]         Date:  2022      Surgeon: * No surgeons listed *    Procedure: * No procedures listed *    Medications prior to admission:   Prior to Admission medications    Medication Sig Start Date End Date Taking?  Authorizing Provider   levETIRAcetam (KEPPRA) 500 MG tablet Take 2 tablets by mouth 2 times daily 22   Sivakumar Sena MD   ondansetron (ZOFRAN-ODT) 4 MG disintegrating tablet Take 1 tablet by mouth every 8 hours as needed for Nausea or Vomiting 22   Sivakumar Sena MD   amLODIPine (NORVASC) 5 MG tablet Take 1 tablet by mouth daily 22   Sivakumar Sena MD   dexamethasone (DECADRON) 4 MG tablet Take 1 tablet by mouth every 8 hours 22  Sivakumar Sena MD   nicotine (NICODERM CQ) 14 MG/24HR Place 1 patch onto the skin daily for 14 days 5/27/22 6/10/22  Sivakumar Sena MD   nicotine (NICODERM CQ) 21 MG/24HR Place 1 patch onto the skin daily for 14 days 22  Sivakumar Sena MD   nicotine (NICODERM CQ) 7 MG/24HR Place 1 patch onto the skin daily for 14 days 22  Sivakumar Sena MD   pantoprazole (PROTONIX) 40 MG tablet Take 1 tablet by mouth every morning (before breakfast) 22   Sivakumar Sena MD   cyclobenzaprine (FLEXERIL) 10 MG tablet TAKE 1 TABLET BY MOUTH THREE TIMES DAILY 22   Historical Provider, MD   atenolol (TENORMIN) 100 MG tablet TK 1 T PO D 20   Historical Provider, MD   atorvastatin (LIPITOR) 40 MG tablet TAKE 1 TABLET EVERY DAY 4/15/15   Rohit Hoffman APRN - CNP       Current medications:    Current Outpatient Medications   Medication Sig Dispense Refill    levETIRAcetam (KEPPRA) 500 MG tablet Take 2 tablets by mouth 2 times daily 60 tablet 1    ondansetron (ZOFRAN-ODT) 4 MG disintegrating tablet Take 1 tablet by mouth every 8 hours as needed for Nausea or Vomiting 30 tablet 0    amLODIPine (NORVASC) 5 MG tablet Take 1 tablet by mouth daily 30 tablet 1    dexamethasone (DECADRON) 4 MG tablet Take 1 tablet by mouth every 8 hours 90 tablet 0    [START ON 5/27/2022] nicotine (NICODERM CQ) 14 MG/24HR Place 1 patch onto the skin daily for 14 days 14 patch 0    nicotine (NICODERM CQ) 21 MG/24HR Place 1 patch onto the skin daily for 14 days 14 patch 0    [START ON 6/11/2022] nicotine (NICODERM CQ) 7 MG/24HR Place 1 patch onto the skin daily for 14 days 14 patch 0    pantoprazole (PROTONIX) 40 MG tablet Take 1 tablet by mouth every morning (before breakfast) 30 tablet 1    cyclobenzaprine (FLEXERIL) 10 MG tablet TAKE 1 TABLET BY MOUTH THREE TIMES DAILY      atenolol (TENORMIN) 100 MG tablet TK 1 T PO D      atorvastatin (LIPITOR) 40 MG tablet TAKE 1 TABLET EVERY DAY 90 tablet 0     Current Facility-Administered Medications   Medication Dose Route Frequency Provider Last Rate Last Admin    bupivacaine (PF) (MARCAINE) 0.5 % injection 150 mg  30 mL IntraDERmal Once Yin Vallejo MD        0.9 % sodium chloride bolus  500 mL IntraVENous Once Yin Vallejo MD        lidocaine PF 1 % injection 30 mL  30 mL IntraDERmal Once Yin Vallejo MD        ondansetron [facility]) injection 4 mg  4 mg IntraVENous Once Yin Vallejo MD        dexamethasone (DECADRON) injection 4 mg  4 mg IntraVENous Once Yin Vallejo MD        dexamethasone (DECADRON) injection 4 mg  4 mg IntraDERmal Once Yin Vallejo MD        sodium bicarbonate 4.2 % injection 1.5 mEq  1.5 mEq IntraDERmal Once Yin Vallejo MD        neomycin-bacitracin-polymyxin (NEOSPORIN) ointment   Topical Once Yin Vallejo MD           Allergies:  No Known Allergies    Problem List:    Patient Active Problem List   Diagnosis Code    HTN (hypertension), benign I10    Vitamin D deficiency E55.9    Vertigo R42    Dyslipidemia E78.5    Intraparenchymal hemorrhage of brain ([facility] [address] I61.9    Lung mass R91.8    Mediastinal lymphadenopathy R59.0       Past Medical History:        Diagnosis Date    Hyperlipidemia     Hypertension     ICH (intracerebral hemorrhage) 05/10/2022    Vitamin D deficiency        Past Surgical History:        Procedure Laterality Date    ANKLE SURGERY Left     BRONCHOSCOPY N/A 5/12/2022    BRONCHOSCOPY BIOPSY BRONCHUS performed by Adalberto Guerrero MD at [facility] COLONOSCOPY         Social History:    Social History     Tobacco Use    Smoking status: Current Every Day Smoker     Packs/day: 1.00     Years: 30.00     Pack years: 30.00     Types: Cigarettes    Smokeless tobacco: Never Used    Tobacco comment: pt states she has chantix at home hasnt started yet   Substance Use Topics    Alcohol use: Yes     Alcohol/week: 2.0 - 4.0 standard drinks     Types: 2 - 4 Shots of liquor per week     Comment: vodka daily                                Ready to quit: Not Answered  Counseling given: Not Answered  Comment: pt states she has chantix at home hasnt started yet      Vital Signs (Current): There were no vitals filed for this visit.                                            BP Readings from Last 3 Encounters:   05/12/22 (!) 153/94   05/12/22 (!) 174/118   05/10/22 (!) 141/93       NPO Status:                                                                                 BMI:   Wt Readings from Last 3 Encounters:   05/10/22 139 lb 15.9 oz (63.5 kg)   05/10/22 153 lb 10.6 oz (69.7 kg)   05/09/22 156 lb (70.8 kg)     There is no height or weight on file to calculate BMI.    CBC:   Lab Results   Component Value Date    WBC 7.1 05/12/2022    RBC 4.85 05/12/2022    HGB 16.8 05/12/2022    HCT 51.3 05/12/2022    .9 05/12/2022    RDW 14.3 05/12/2022     05/12/2022       CMP:   Lab Results   Component Value Date     05/12/2022    K 4.6 05/12/2022    K 4.9 05/09/2022    CL 99 05/12/2022    CO2 20 05/12/2022    BUN 19 05/12/2022    CREATININE 0.8 05/12/2022    GFRAA >60 05/12/2022    GFRAA >60 01/04/2011    AGRATIO 1.4 05/09/2022    LABGLOM >60 05/12/2022    GLUCOSE 123 05/12/2022    PROT 7.5 05/09/2022    PROT 8.6 10/29/2010    CALCIUM 10.1 05/12/2022    BILITOT 0.6 05/09/2022    ALKPHOS 96 05/09/2022    AST 52 05/09/2022    ALT 28 05/09/2022       POC Tests: No results for input(s): POCGLU, POCNA, POCK, POCCL, POCBUN, POCHEMO, POCHCT in the last 72 hours. Coags:   Lab Results   Component Value Date    PROTIME 11.1 05/09/2022    INR 0.98 05/09/2022    APTT 44.5 05/09/2022       HCG (If Applicable):   Lab Results   Component Value Date    PREGTESTUR Neg 03/22/2010        ABGs:   Lab Results   Component Value Date    PHART 7.361 05/09/2022    PO2ART 141.0 05/09/2022    GXH0AYK 39.2 05/09/2022    VOY7GGV 22.2 05/09/2022    BEART -3.0 05/09/2022    W1HLMDAA 99.9 05/09/2022        Type & Screen (If Applicable):  No results found for: LABABO, LABRH    Drug/Infectious Status (If Applicable):  No results found for: HIV, HEPCAB    COVID-19 Screening (If Applicable): No results found for: COVID19        Anesthesia Evaluation    Airway: Mallampati: II  TM distance: >3 FB   Neck ROM: full  Mouth opening: > = 3 FB Dental:          Pulmonary:                              Cardiovascular:    (+) hypertension:,         Rhythm: regular  Rate: normal                    Neuro/Psych:               GI/Hepatic/Renal:             Endo/Other:    (+) malignancy/cancer. Abdominal:             Vascular: Other Findings:             Anesthesia Plan      MAC     ASA 3       Induction: intravenous. Anesthetic plan and risks discussed with patient. Plan discussed with CRNA.                   Karen Trinh MD   5/16/2022

## 2022-05-17 ENCOUNTER — HOSPITAL ENCOUNTER (OUTPATIENT)
Dept: RADIATION ONCOLOGY | Age: 57
Discharge: HOME | End: 2022-05-17
Payer: COMMERCIAL

## 2022-05-17 ENCOUNTER — APPOINTMENT (OUTPATIENT)
Dept: RADIATION ONCOLOGY | Age: 57
End: 2022-05-17
Payer: COMMERCIAL

## 2022-05-17 PROCEDURE — 77334 RADIATION TREATMENT AID(S): CPT

## 2022-05-17 PROCEDURE — 77373 STRTCTC BDY RAD THER TX DLVR: CPT

## 2022-05-17 NOTE — PROGRESS NOTES
Patient arrived for 1/4 frameless treatments. This RN also spoke to patient POD #1 from [facility] radiosurgery. Patient denies bleeding, swelling, headache, or fever. Reinforced all post-procedure instructions. Patient reminded of tomorrow's treatment time of 0830. Patient verbalized understanding to call with any new neurological symptoms. Encouraged to call with any questions or concerns.      Nisha Shelby RN

## 2022-05-17 NOTE — PROGRESS NOTES
Fractionated SRS treatment note on the gamma knife machine. Martina Paul  05/17/22       Patient presents for her 2nd of 5 treatments on the gamma knife machine for her brain mets. She was taken to the gamma knife machine, and her mask was fitted to the machine. Patient confirmation and identification was confirmed using 2 means and a timeout procedure was also done prior to the procedure.     Cone beam CT scan was performed. These images were reviewed in detailed and fused upon her stereotactic reference images. Isodose lines were also reviewed. These were then accepted by myself and our physicist.     Fraction Number: 2  Dose delivered: 6 Gy  Total dose delivered: 12 Gy  Planned total dose:  30Gy     The patient acutely tolerated the procedure well. Mask was removed. She will follow-up when the next appointment has been scheduled. I was present throughout the procedure.     Giuseppe Carreon MD

## 2022-05-18 ENCOUNTER — HOSPITAL ENCOUNTER (OUTPATIENT)
Dept: RADIATION ONCOLOGY | Age: 57
Discharge: HOME | End: 2022-05-18
Payer: COMMERCIAL

## 2022-05-18 ENCOUNTER — APPOINTMENT (OUTPATIENT)
Dept: RADIATION ONCOLOGY | Age: 57
End: 2022-05-18
Payer: COMMERCIAL

## 2022-05-18 PROCEDURE — 77373 STRTCTC BDY RAD THER TX DLVR: CPT

## 2022-05-18 NOTE — PROGRESS NOTES
Fractionated SRS treatment note on the gamma knife machine. Arti Chatman  05/18/22       Patient presents for her 3  of 5 treatments on the gamma knife machine for her * 3rd    She was taken to the gamma knife machine, and her mask was fitted to the machine. Patient confirmation and identification was confirmed using 2 means and a timeout procedure was also done prior to the procedure.     Cone beam CT scan was performed. These images were reviewed in detailed and fused upon her stereotactic reference images. Isodose lines were also reviewed. These were then accepted by myself and our physicist.     Fraction Number:  3  Dose delivered: 6 Gy  Total dose delivered 18 Gy  Planned total dose:  30Gy     The patient acutely tolerated the procedure well. Mask was removed. She will follow-up when the next appointment has been scheduled. I was present throughout the procedure.     Rochelle Ragland MD

## 2022-05-18 NOTE — PROGRESS NOTES
Pt arrives for 3/5 frameless treatments. Completed treatment without difficulty. Pt then escorted to exam room to meet with Dr. Yessy Flores.   Mariam Velazquez RN

## 2022-05-19 ENCOUNTER — APPOINTMENT (OUTPATIENT)
Dept: RADIATION ONCOLOGY | Age: 57
End: 2022-05-19
Payer: COMMERCIAL

## 2022-05-19 ENCOUNTER — HOSPITAL ENCOUNTER (OUTPATIENT)
Dept: RADIATION ONCOLOGY | Age: 57
Discharge: HOME | End: 2022-05-19
Payer: COMMERCIAL

## 2022-05-19 VITALS
DIASTOLIC BLOOD PRESSURE: 64 MMHG | OXYGEN SATURATION: 95 % | HEART RATE: 64 BPM | SYSTOLIC BLOOD PRESSURE: 146 MMHG | RESPIRATION RATE: 16 BRPM

## 2022-05-19 PROCEDURE — 77373 STRTCTC BDY RAD THER TX DLVR: CPT

## 2022-05-19 NOTE — PROGRESS NOTES
Pt arrives for 4/5 frameless treatments. Completed treatment without difficulty. Pt then met with Dr. Shannan Saldaña for weekly visit. Vital signs taken.  Patient reminded of treatment time for 5/20

## 2022-05-19 NOTE — PROGRESS NOTES
Fractionated SRS treatment note on the gamma knife machine. Radha Turner  05/19/22       Patient presents for her 4th of 5 treatments on the gamma knife machine for her 2 large brain mets. She was taken to the gamma knife machine, and her mask was fitted to the machine. Patient confirmation and identification was confirmed using 2 means and a timeout procedure was also done prior to the procedure.     Cone beam CT scan was performed. These images were reviewed in detailed and fused upon her stereotactic reference images. Isodose lines were also reviewed. These were then accepted by myself and our physicist.     Fraction Number: 4  Dose delivered: 6 Gy  Total dose delivered: 24 Gy  Planned total dose:  30 Gy     The patient acutely tolerated the procedure well. Mask was removed. She will follow-up when the next appointment has been scheduled. I was present throughout the procedure.     Yesenia Palmer MD

## 2022-05-20 ENCOUNTER — APPOINTMENT (OUTPATIENT)
Dept: RADIATION ONCOLOGY | Age: 57
End: 2022-05-20
Payer: COMMERCIAL

## 2022-05-20 ENCOUNTER — HOSPITAL ENCOUNTER (OUTPATIENT)
Dept: RADIATION ONCOLOGY | Age: 57
Discharge: HOME | End: 2022-05-20
Payer: COMMERCIAL

## 2022-05-20 PROCEDURE — 77373 STRTCTC BDY RAD THER TX DLVR: CPT

## 2022-05-20 PROCEDURE — 77336 RADIATION PHYSICS CONSULT: CPT

## 2022-05-20 NOTE — PROGRESS NOTES
Patient not reached. Preop instructions left on voice mail. [phone]______________    -Date_5/23/2022______time_1245______arrival_1115  masc___________  -Nothing to eat or drink after midnight  -Responsible adult 25 or older to stay on site while you are here and drive you home and stay with you after  -Follow any instructions your doctors office has given you  -Bring a complete list of all your medications and supplements  -If you normally take the following medications in the morning please do so with a small    sip of water-heart,blood pressure,seizure,breathing or thyroid-avoid water pilll Do not take blood pressure medications ending in \"james\" or \"pril\" the AM of surgery or the april prior  -You may use your inhalers  -Take half of your normal dose of any long acting insulins the night before-do not take    any diabetic medications in the morning  -Follow your doctors instructions regarding blood thinners  -Any questions call your surgeons office            VISITOR POLICY(subject to change)    Current policy is 2 visitors per patient. No children. Masks are required. Visiting hours are 8a-8p. Overnight visitors will be at the discretion of the nurse.

## 2022-05-20 NOTE — PROGRESS NOTES
1000  Patient arrived for the last 4/4 Gamma Knife treatment. Patient tolerated treatment with no complications. Steroid taper and Keppra medications along with discharge instructions were reviewed. Patient   And  verbalized understanding.    Brittany Bolaños RN

## 2022-05-23 ENCOUNTER — APPOINTMENT (OUTPATIENT)
Dept: GENERAL RADIOLOGY | Age: 57
End: 2022-05-23
Attending: SURGERY
Payer: COMMERCIAL

## 2022-05-23 ENCOUNTER — ANESTHESIA EVENT (OUTPATIENT)
Dept: OPERATING ROOM | Age: 57
End: 2022-05-23
Payer: COMMERCIAL

## 2022-05-23 ENCOUNTER — HOSPITAL ENCOUNTER (OUTPATIENT)
Age: 57
Setting detail: OUTPATIENT SURGERY
Discharge: HOME | End: 2022-05-23
Attending: SURGERY | Admitting: SURGERY
Payer: COMMERCIAL

## 2022-05-23 ENCOUNTER — ANESTHESIA (OUTPATIENT)
Dept: OPERATING ROOM | Age: 57
End: 2022-05-23
Payer: COMMERCIAL

## 2022-05-23 VITALS
OXYGEN SATURATION: 93 % | SYSTOLIC BLOOD PRESSURE: 165 MMHG | TEMPERATURE: 97 F | WEIGHT: 143.6 LBS | HEIGHT: 65 IN | RESPIRATION RATE: 17 BRPM | BODY MASS INDEX: 23.93 KG/M2 | DIASTOLIC BLOOD PRESSURE: 92 MMHG | HEART RATE: 72 BPM

## 2022-05-23 DIAGNOSIS — C34.12 MALIGNANT NEOPLASM OF UPPER LOBE OF LEFT LUNG (HCC): Primary | ICD-10-CM

## 2022-05-23 PROCEDURE — 7100000000 HC PACU RECOVERY - FIRST 15 MIN: Performed by: SURGERY

## 2022-05-23 PROCEDURE — 2580000003 HC RX 258: Performed by: SURGERY

## 2022-05-23 PROCEDURE — 3700000000 HC ANESTHESIA ATTENDED CARE: Performed by: SURGERY

## 2022-05-23 PROCEDURE — 6360000002 HC RX W HCPCS

## 2022-05-23 PROCEDURE — 6360000002 HC RX W HCPCS: Performed by: NURSE ANESTHETIST, CERTIFIED REGISTERED

## 2022-05-23 PROCEDURE — C1769 GUIDE WIRE: HCPCS | Performed by: SURGERY

## 2022-05-23 PROCEDURE — 2500000003 HC RX 250 WO HCPCS: Performed by: SURGERY

## 2022-05-23 PROCEDURE — 36561 INSERT TUNNELED CV CATH: CPT | Performed by: SURGERY

## 2022-05-23 PROCEDURE — 77001 FLUOROGUIDE FOR VEIN DEVICE: CPT

## 2022-05-23 PROCEDURE — 2709999900 HC NON-CHARGEABLE SUPPLY: Performed by: SURGERY

## 2022-05-23 PROCEDURE — 2580000003 HC RX 258: Performed by: NURSE ANESTHETIST, CERTIFIED REGISTERED

## 2022-05-23 PROCEDURE — 3600000002 HC SURGERY LEVEL 2 BASE: Performed by: SURGERY

## 2022-05-23 PROCEDURE — 7100000001 HC PACU RECOVERY - ADDTL 15 MIN: Performed by: SURGERY

## 2022-05-23 PROCEDURE — 6370000000 HC RX 637 (ALT 250 FOR IP): Performed by: ANESTHESIOLOGY

## 2022-05-23 PROCEDURE — 71045 X-RAY EXAM CHEST 1 VIEW: CPT

## 2022-05-23 PROCEDURE — 76937 US GUIDE VASCULAR ACCESS: CPT | Performed by: SURGERY

## 2022-05-23 PROCEDURE — 7100000010 HC PHASE II RECOVERY - FIRST 15 MIN: Performed by: SURGERY

## 2022-05-23 PROCEDURE — 77001 FLUOROGUIDE FOR VEIN DEVICE: CPT | Performed by: SURGERY

## 2022-05-23 PROCEDURE — 6360000002 HC RX W HCPCS: Performed by: SURGERY

## 2022-05-23 PROCEDURE — 2500000003 HC RX 250 WO HCPCS: Performed by: NURSE ANESTHETIST, CERTIFIED REGISTERED

## 2022-05-23 PROCEDURE — 7100000011 HC PHASE II RECOVERY - ADDTL 15 MIN: Performed by: SURGERY

## 2022-05-23 PROCEDURE — 3600000012 HC SURGERY LEVEL 2 ADDTL 15MIN: Performed by: SURGERY

## 2022-05-23 PROCEDURE — 3700000001 HC ADD 15 MINUTES (ANESTHESIA): Performed by: SURGERY

## 2022-05-23 PROCEDURE — C1788 PORT, INDWELLING, IMP: HCPCS | Performed by: SURGERY

## 2022-05-23 DEVICE — PORT INFUS OD2.7MM ID1.5MM INTRO 8FR TI POLYUR CATH DETACH CT80STPD] ANGIODYNAMICS INC]: Type: IMPLANTABLE DEVICE | Site: CHEST | Status: FUNCTIONAL

## 2022-05-23 RX ORDER — LIDOCAINE HYDROCHLORIDE 20 MG/ML
INJECTION, SOLUTION INFILTRATION; PERINEURAL PRN
Status: DISCONTINUED | OUTPATIENT
Start: 2022-05-23 | End: 2022-05-23 | Stop reason: SDUPTHER

## 2022-05-23 RX ORDER — SODIUM CHLORIDE 9 MG/ML
INJECTION, SOLUTION INTRAVENOUS CONTINUOUS
Status: DISCONTINUED | OUTPATIENT
Start: 2022-05-23 | End: 2022-05-23 | Stop reason: HOSPADM

## 2022-05-23 RX ORDER — GLYCOPYRROLATE 1 MG/5 ML
SYRINGE (ML) INTRAVENOUS PRN
Status: DISCONTINUED | OUTPATIENT
Start: 2022-05-23 | End: 2022-05-23 | Stop reason: SDUPTHER

## 2022-05-23 RX ORDER — DEXAMETHASONE SODIUM PHOSPHATE 4 MG/ML
INJECTION, SOLUTION INTRA-ARTICULAR; INTRALESIONAL; INTRAMUSCULAR; INTRAVENOUS; SOFT TISSUE PRN
Status: DISCONTINUED | OUTPATIENT
Start: 2022-05-23 | End: 2022-05-23 | Stop reason: SDUPTHER

## 2022-05-23 RX ORDER — OXYCODONE HYDROCHLORIDE 5 MG/1
5 TABLET ORAL PRN
Status: DISCONTINUED | OUTPATIENT
Start: 2022-05-23 | End: 2022-05-23 | Stop reason: HOSPADM

## 2022-05-23 RX ORDER — HYDRALAZINE HYDROCHLORIDE 20 MG/ML
10 INJECTION INTRAMUSCULAR; INTRAVENOUS
Status: DISCONTINUED | OUTPATIENT
Start: 2022-05-23 | End: 2022-05-23 | Stop reason: HOSPADM

## 2022-05-23 RX ORDER — FENTANYL CITRATE 50 UG/ML
25 INJECTION, SOLUTION INTRAMUSCULAR; INTRAVENOUS EVERY 5 MIN PRN
Status: DISCONTINUED | OUTPATIENT
Start: 2022-05-23 | End: 2022-05-23 | Stop reason: HOSPADM

## 2022-05-23 RX ORDER — LABETALOL HYDROCHLORIDE 5 MG/ML
10 INJECTION, SOLUTION INTRAVENOUS
Status: DISCONTINUED | OUTPATIENT
Start: 2022-05-23 | End: 2022-05-23 | Stop reason: HOSPADM

## 2022-05-23 RX ORDER — ONDANSETRON 2 MG/ML
4 INJECTION INTRAMUSCULAR; INTRAVENOUS
Status: DISCONTINUED | OUTPATIENT
Start: 2022-05-23 | End: 2022-05-23 | Stop reason: HOSPADM

## 2022-05-23 RX ORDER — ACETAMINOPHEN 325 MG/1
650 TABLET ORAL ONCE
Status: COMPLETED | OUTPATIENT
Start: 2022-05-23 | End: 2022-05-23

## 2022-05-23 RX ORDER — DIPHENHYDRAMINE HYDROCHLORIDE 50 MG/ML
12.5 INJECTION INTRAMUSCULAR; INTRAVENOUS
Status: DISCONTINUED | OUTPATIENT
Start: 2022-05-23 | End: 2022-05-23 | Stop reason: HOSPADM

## 2022-05-23 RX ORDER — HYDROCODONE BITARTRATE AND ACETAMINOPHEN 5; 325 MG/1; MG/1
1 TABLET ORAL EVERY 4 HOURS PRN
Qty: 15 TABLET | Refills: 0 | Status: SHIPPED | OUTPATIENT
Start: 2022-05-23 | End: 2022-05-30

## 2022-05-23 RX ORDER — ONDANSETRON 2 MG/ML
INJECTION INTRAMUSCULAR; INTRAVENOUS PRN
Status: DISCONTINUED | OUTPATIENT
Start: 2022-05-23 | End: 2022-05-23 | Stop reason: SDUPTHER

## 2022-05-23 RX ORDER — BUPIVACAINE HYDROCHLORIDE 5 MG/ML
INJECTION, SOLUTION EPIDURAL; INTRACAUDAL
Status: COMPLETED | OUTPATIENT
Start: 2022-05-23 | End: 2022-05-23

## 2022-05-23 RX ORDER — MIDAZOLAM HYDROCHLORIDE 1 MG/ML
INJECTION INTRAMUSCULAR; INTRAVENOUS PRN
Status: DISCONTINUED | OUTPATIENT
Start: 2022-05-23 | End: 2022-05-23 | Stop reason: SDUPTHER

## 2022-05-23 RX ORDER — MEPERIDINE HYDROCHLORIDE 25 MG/ML
12.5 INJECTION INTRAMUSCULAR; INTRAVENOUS; SUBCUTANEOUS
Status: DISCONTINUED | OUTPATIENT
Start: 2022-05-23 | End: 2022-05-23 | Stop reason: HOSPADM

## 2022-05-23 RX ORDER — SODIUM CHLORIDE 9 MG/ML
INJECTION, SOLUTION INTRAVENOUS CONTINUOUS PRN
Status: DISCONTINUED | OUTPATIENT
Start: 2022-05-23 | End: 2022-05-23 | Stop reason: SDUPTHER

## 2022-05-23 RX ORDER — HALOPERIDOL 5 MG/ML
1 INJECTION INTRAMUSCULAR
Status: DISCONTINUED | OUTPATIENT
Start: 2022-05-23 | End: 2022-05-23 | Stop reason: HOSPADM

## 2022-05-23 RX ORDER — FENTANYL CITRATE 50 UG/ML
50 INJECTION, SOLUTION INTRAMUSCULAR; INTRAVENOUS EVERY 5 MIN PRN
Status: DISCONTINUED | OUTPATIENT
Start: 2022-05-23 | End: 2022-05-23 | Stop reason: HOSPADM

## 2022-05-23 RX ORDER — OXYCODONE HYDROCHLORIDE 5 MG/1
10 TABLET ORAL PRN
Status: DISCONTINUED | OUTPATIENT
Start: 2022-05-23 | End: 2022-05-23 | Stop reason: HOSPADM

## 2022-05-23 RX ORDER — PROPOFOL 10 MG/ML
INJECTION, EMULSION INTRAVENOUS PRN
Status: DISCONTINUED | OUTPATIENT
Start: 2022-05-23 | End: 2022-05-23 | Stop reason: SDUPTHER

## 2022-05-23 RX ORDER — MIDAZOLAM HYDROCHLORIDE 2 MG/2ML
2 INJECTION, SOLUTION INTRAMUSCULAR; INTRAVENOUS
Status: DISCONTINUED | OUTPATIENT
Start: 2022-05-23 | End: 2022-05-23 | Stop reason: HOSPADM

## 2022-05-23 RX ORDER — FENTANYL CITRATE 50 UG/ML
INJECTION, SOLUTION INTRAMUSCULAR; INTRAVENOUS PRN
Status: DISCONTINUED | OUTPATIENT
Start: 2022-05-23 | End: 2022-05-23 | Stop reason: SDUPTHER

## 2022-05-23 RX ADMIN — Medication 0.2 MG: at 13:08

## 2022-05-23 RX ADMIN — FENTANYL CITRATE 50 MCG: 50 INJECTION, SOLUTION INTRAMUSCULAR; INTRAVENOUS at 12:48

## 2022-05-23 RX ADMIN — SODIUM CHLORIDE: 9 INJECTION, SOLUTION INTRAVENOUS at 12:48

## 2022-05-23 RX ADMIN — DEXAMETHASONE SODIUM PHOSPHATE 4 MG: 4 INJECTION, SOLUTION INTRAMUSCULAR; INTRAVENOUS at 13:24

## 2022-05-23 RX ADMIN — ONDANSETRON 4 MG: 2 INJECTION INTRAMUSCULAR; INTRAVENOUS at 13:24

## 2022-05-23 RX ADMIN — CEFAZOLIN 2000 MG: 2 INJECTION, POWDER, FOR SOLUTION INTRAMUSCULAR; INTRAVENOUS at 12:40

## 2022-05-23 RX ADMIN — SODIUM CHLORIDE: 9 INJECTION, SOLUTION INTRAVENOUS at 13:44

## 2022-05-23 RX ADMIN — LIDOCAINE HYDROCHLORIDE 80 MG: 20 INJECTION, SOLUTION INFILTRATION; PERINEURAL at 12:48

## 2022-05-23 RX ADMIN — Medication 0.2 MG: at 13:14

## 2022-05-23 RX ADMIN — SODIUM CHLORIDE: 9 INJECTION, SOLUTION INTRAVENOUS at 11:00

## 2022-05-23 RX ADMIN — ACETAMINOPHEN 650 MG: 325 TABLET ORAL at 11:48

## 2022-05-23 RX ADMIN — PROPOFOL 80 MG: 10 INJECTION, EMULSION INTRAVENOUS at 12:48

## 2022-05-23 RX ADMIN — MIDAZOLAM 2 MG: 1 INJECTION INTRAMUSCULAR; INTRAVENOUS at 12:42

## 2022-05-23 ASSESSMENT — PAIN SCALES - GENERAL
PAINLEVEL_OUTOF10: 0
PAINLEVEL_OUTOF10: 0

## 2022-05-23 NOTE — ANESTHESIA POSTPROCEDURE EVALUATION
Department of Anesthesiology  Postprocedure Note    Patient: Zora Bean  MRN: [id number]  YOB: 1965  Date of evaluation: 5/23/2022  Time:  2:01 PM     Procedure Summary     Date: 05/23/22 Room / Location: [facility] OR [address] [place] [address]    Anesthesia Start: 1243 Anesthesia Stop: 7650    Procedure: PORT A CATHETER PLACEMENT (Left Chest) Diagnosis:       Malignant neoplasm of lung, unspecified laterality, unspecified part of lung (HCC)      (C34.90, C79.31  LUNG CANCER)    Surgeons: Bharat Garcia MD Responsible Provider: Star Rodriguez MD    Anesthesia Type: MAC ASA Status: 3          Anesthesia Type: No value filed. Sinai Phase I: Sinai Score: 8    Sinai Phase II:      Last vitals: Reviewed and per EMR flowsheets.        Anesthesia Post Evaluation    Patient location during evaluation: PACU  Patient participation: complete - patient participated  Level of consciousness: awake and alert  Airway patency: patent  Nausea & Vomiting: no nausea and no vomiting  Complications: no  Cardiovascular status: blood pressure returned to baseline  Respiratory status: acceptable  Hydration status: euvolemic  Multimodal analgesia pain management approach

## 2022-05-23 NOTE — PROGRESS NOTES
CXR clear and port in place without evidence of pnuemothorax. VSS. Discharge instructions reviewed with pt and . Sidney pain or discomfort. Soda given. No nausea. PIV d/c'd and pt dressing for discharge at this time.

## 2022-05-23 NOTE — H&P
5/12/22 6/11/22  Cary Aquino MD   nicotine (NICODERM CQ) 14 MG/24HR Place 1 patch onto the skin daily for 14 days 5/27/22 6/10/22  Cary Aquino MD   nicotine (NICODERM CQ) 21 MG/24HR Place 1 patch onto the skin daily for 14 days 5/12/22 5/26/22  Cary Aquino MD   nicotine (NICODERM CQ) 7 MG/24HR Place 1 patch onto the skin daily for 14 days 6/11/22 6/25/22  Cary Aquino MD   pantoprazole (PROTONIX) 40 MG tablet Take 1 tablet by mouth every morning (before breakfast) 5/12/22   Cary Aquino MD   cyclobenzaprine (FLEXERIL) 10 MG tablet TAKE 1 TABLET BY MOUTH THREE TIMES DAILY 5/6/22   Historical Provider, MD   atenolol (TENORMIN) 100 MG tablet TK 1 T PO D 2/27/20   Historical Provider, MD   atorvastatin (LIPITOR) 40 MG tablet TAKE 1 TABLET EVERY DAY 4/15/15   JACQUIE Ann - CNP        Allergies:  Patient has no known allergies. Social History:    reports that she has been smoking cigarettes. She has a 30.00 pack-year smoking history. She has never used smokeless tobacco. She reports current alcohol use of about 2.0 - 4.0 standard drinks of alcohol per week. She reports that she does not use drugs.     Family History:        Problem Relation Age of Onset    Stroke Mother     Cancer Father          PHYSICAL EXAM:  VITALS:  BP (!) 180/99   Pulse 61   Temp 98.7 °F (37.1 °C) (Temporal)   Resp 16   Ht 5' 5\" (1.651 m)   Wt 143 lb 9.6 oz (65.1 kg)   SpO2 99%   BMI 23.90 kg/m²     CONSTITUTIONAL:  alert, no apparent distress and normal weight  EYES:  sclera clear:  normocepalic, without obvious abnormality  NECK:  supple, symmetrical, trachea midline  LUNGS:  clear to auscultation  CARDIOVASCULAR:  regular rate and rhythm  MUSCULOSKELETAL:  0+ edema of upper extremities  NEUROLOGIC:  Mental Status Exam:  Level of Alertness:   awake  Orientation:   person, place, time  SKIN: arm bruising present without active bleeding, IV in place      IMPRESSION/RECOMMENDATIONS:    Metastatic lung cancer      Will place a single lumen power port today. This will facilitate chemotherapy, and overall IV access. The plan for surgery along with the risks including bleeding, infection, clotting and swelling have been reviewed today. All questions have been answered and she wishes to proceed.       Lydia Marin MD

## 2022-05-23 NOTE — BRIEF OP NOTE
Brief Postoperative Note      Patient: Irma oTdd  YOB: 1965  MRN: [id number]    Date of Procedure: 5/23/2022    Pre-Op Diagnosis: C34.90, C79.31  LUNG CANCER    Post-Op Diagnosis: Same       Procedure(s):  PORT A CATHETER PLACEMENT    Surgeon(s):  Carolina Waggoner MD    Assistant:  Surgical Assistant: Kelley Glasgow    Anesthesia: Monitor Anesthesia Care    Estimated Blood Loss (mL): Minimal    Complications: None    Specimens:   * No specimens in log *    Implants:  Implant Name Type Inv. Item Serial No.  Lot No. LRB No. Used Action   PORT INFUS OD2.7MM ID1. 5MM INTRO 8FR TI POLYUR CATH DETACH [[id number]] [[employer]] - [id number]  PORT INFUS OD2.7MM ID1. 5MM INTRO 8FR TI POLYUR CATH DETACH [[id number]] [[employer]]  [employer]WD [id number] Left 1 Implanted         Drains:   [REMOVED] Urinary Catheter Dominguez-Temperature (Removed)   $ Urethral catheter insertion $ Not inserted for procedure 05/09/22 2250   Catheter Indications Need for fluid volume management of the critically ill patient in a critical care setting 05/10/22 1200   Site Assessment [place] 05/10/22 1200   Urine Color Yellow 05/10/22 1200   Urine Appearance Clear 05/10/22 1200   Collection Container Standard 05/10/22 1200   Securement Method Securing device (Describe) 05/10/22 1200   Catheter Care Completed Yes 05/10/22 0800   Catheter Best Practices  Drainage tube clipped to bed 05/10/22 1200   Status Draining 05/10/22 1200   Output (mL) 450 mL 05/10/22 1500       Findings: 8 Fr Power port placed. Left IJ site.     Electronically signed by Carolina Waggoner MD on 5/23/2022 at 1:40 PM

## 2022-05-23 NOTE — PROGRESS NOTES
Pt arrived to PACU from OR in Stable condition and is RASS -1 (Drowsy) . Respirations are Regular Pattern; RR 8-20 = 18 on 5L O2 per simple mask. Skin warm and dry. Abd is  soft. Pain: denies at this time. Left chest and neck surgical site(s) intact with surgical glue= clean, dry and intact. Will continue to monitor for safety and comfort. S/P: Port a cath placement, with Dr. Nadia Castillo at [facility].

## 2022-05-23 NOTE — PROGRESS NOTES
Transferred to phase 2 level of care. VSS. Awake and alert. Room air sat low 90's. Pt with history of lung CA. CXR pending post port placement. No SOB noted. LCTA.

## 2022-05-23 NOTE — ANESTHESIA PRE PROCEDURE
Rate Last Admin    0.9 % sodium chloride infusion   IntraVENous Continuous Junior Khalil  mL/hr at 05/23/22 1100 New Bag at 05/23/22 1100    midazolam PF (VERSED) injection 2 mg  2 mg IntraVENous Once PRN Laura Ku MD           Allergies:  No Known Allergies    Problem List:    Patient Active Problem List   Diagnosis Code    HTN (hypertension), benign I10    Vitamin D deficiency E55.9    Vertigo R42    Dyslipidemia E78.5    Intraparenchymal hemorrhage of brain ([facility] I61.9    Lung mass R91.8    Mediastinal lymphadenopathy R59.0       Past Medical History:        Diagnosis Date    Balance disorder     Vestibular neuritis (?)    [facility]     Dizziness     Vestibular Neuritis (?)    Hyperlipidemia     Hypertension     ICH (intracerebral hemorrhage) 05/10/2022    Liver cancer ([facility]     Vitamin D deficiency        Past Surgical History:        Procedure Laterality Date    ANKLE SURGERY Left     BRONCHOSCOPY N/A 5/12/2022    BRONCHOSCOPY BIOPSY BRONCHUS performed by Melvin Jacobo MD at [facility]         Social History:    Social History     Tobacco Use    Smoking status: Current Every Day Smoker     Packs/day: 1.00     Years: 30.00     Pack years: 30.00     Types: Cigarettes    Smokeless tobacco: Never Used    Tobacco comment: pt states she has chantix at home hasnt started yet   Substance Use Topics    Alcohol use: Yes     Alcohol/week: 2.0 - 4.0 standard drinks     Types: 2 - 4 Shots of liquor per week     Comment: vodka daily                                Ready to quit: Not Answered  Counseling given: Not Answered  Comment: pt states she has chantix at home hasnt started yet      Vital Signs (Current): There were no vitals filed for this visit.                                            BP Readings from Last 3 Encounters:   05/23/22 (!) 180/99   05/19/22 (!) 146/64   05/16/22 (!) 151/86       NPO Status: BMI:   Wt Readings from Last 3 Encounters:   05/23/22 143 lb 9.6 oz (65.1 kg)   05/16/22 144 lb 4.8 oz (65.5 kg)   05/10/22 139 lb 15.9 oz (63.5 kg)     There is no height or weight on file to calculate BMI.    CBC:   Lab Results   Component Value Date    WBC 7.1 05/12/2022    RBC 4.85 05/12/2022    HGB 16.8 05/12/2022    HCT 51.3 05/12/2022    .9 05/12/2022    RDW 14.3 05/12/2022     05/12/2022       CMP:   Lab Results   Component Value Date     05/12/2022    K 4.6 05/12/2022    K 4.9 05/09/2022    CL 99 05/12/2022    CO2 20 05/12/2022    BUN 19 05/12/2022    CREATININE 0.8 05/12/2022    GFRAA >60 05/12/2022    GFRAA >60 01/04/2011    AGRATIO 1.4 05/09/2022    LABGLOM >60 05/12/2022    GLUCOSE 123 05/12/2022    PROT 7.5 05/09/2022    PROT 8.6 10/29/2010    CALCIUM 10.1 05/12/2022    BILITOT 0.6 05/09/2022    ALKPHOS 96 05/09/2022    AST 52 05/09/2022    ALT 28 05/09/2022       POC Tests:   No results for input(s): POCGLU, POCNA, POCK, POCCL, POCBUN, POCHEMO, POCHCT in the last 72 hours.     Coags:   Lab Results   Component Value Date    PROTIME 11.1 05/09/2022    INR 0.98 05/09/2022    APTT 44.5 05/09/2022       HCG (If Applicable):   Lab Results   Component Value Date    PREGTESTUR Neg 03/22/2010        ABGs:   Lab Results   Component Value Date    PHART 7.361 05/09/2022    PO2ART 141.0 05/09/2022    RMT7XTT 39.2 05/09/2022    ZVE4HPJ 22.2 05/09/2022    BEART -3.0 05/09/2022    O1ISWUTH 99.9 05/09/2022        Type & Screen (If Applicable):  No results found for: LABABO, LABRH    Drug/Infectious Status (If Applicable):  No results found for: HIV, HEPCAB    COVID-19 Screening (If Applicable): No results found for: COVID19        Anesthesia Evaluation  Patient summary reviewed no history of anesthetic complications:   Airway: Mallampati: II  TM distance: >3 FB   Neck ROM: full  Mouth opening: > = 3 FB   Dental:          Pulmonary: Cardiovascular:    (+) hypertension:,         Rhythm: regular  Rate: normal                    Neuro/Psych:   (+) seizures:,             GI/Hepatic/Renal:             Endo/Other:    (+) malignancy/cancer. Abdominal:             Vascular: Other Findings:             Anesthesia Plan      MAC     ASA 3       Induction: intravenous. Anesthetic plan and risks discussed with patient. Plan discussed with CRNA.                   Corinne Cedillo MD   5/23/2022

## 2022-05-24 NOTE — OP NOTE
[facility]                     [address] [facility], [address] [place] [address]                                OPERATIVE REPORT    PATIENT NAME: Mere Hamlin                     :        1965  MED REC NO:   [id number]                          ROOM:  ACCOUNT NO:   [de-identified]                           ADMIT DATE: 2022  PROVIDER:     Marlena Ambrocio MD    DATE OF PROCEDURE:  2022    PREOPERATIVE DIAGNOSIS:  Metastatic colon cancer. POSTOPERATIVE DIAGNOSIS:  Metastatic colon cancer. PROCEDURE:  1. Single-lumen PowerPort placement left internal jugular vein access  site. 2.  Fluoro use for port placement. 3.  Ultrasound guidance for venous access. SURGEON:  Marlena Ambrocio MD    ANESTHESIA:  LMA and local.    ESTIMATED BLOOD LOSS:  Minimal.    COMPLICATIONS:  None. INDICATION FOR SURGERY:  The patient is a 57-year-old female presenting  for port placement to help facilitate care for her lung cancer. Intended procedure was reviewed with the patient. All questions were  answered. Antibiotics were ordered and administered. ADDITIONAL DETAILS OF SURGERY:  The patient was brought to the operating  room and placed on the operating table in the supine position. Compression boots were placed. General anesthetic was administered. LMA was used for airway support. The patient was stable throughout the  anesthetic course of the surgery. Time-out was done. Local anesthetic was placed in the left subclavicular area around the  area of her incision. Incision was made at this site. Dissection was  carried down through the skin and subcutaneous tissue to attempt to  identify the cephalic vein. We did identify very small vein not  suitable for port placement. I made the pocket at this site and then  chose to use the internal jugular for the access site. The ultrasound  was obtained.   The vasculature of the neck was identified appropriately. Under a single puncture, the needle was passed into the left internal  jugular vein . Wire was advanced initially using the ultrasound and then  the fluoro was used to guide the Glidewire down to the superior vena  cava right atrial junction. Photo was done of the ultrasound guidance  with the wire in the vein appropriately. Introducer dilator combination was then passed over the wire. The wire  and dilator were removed and the Port-A-Cath tubing was passed under  fluoroscopic guidance through the introducer sheath to the superior vena  cava right atrial junction. The introducer sheath was removed. The  _____ was then used to hold the tubing back into the pocket made in the  left upper chest.  The port and hub assembly was attached to the tubing  with excellent flow and flush and aspiration confirmed. The fluoro was  again used to check the position of the catheter a final time which was  fine. The port was sewn to the pocket with 2-0 silk sutures x2. It was  aspirated and irrigated and flushed clear with heparinized saline  solution a final time. The neck was closed with a single 3-0 Vicryl  suture and Dermabond glue. The site of the pocket was closed with  interrupted 3-0 Vicryl subdermal sutures, 4-0 Monocryl for the skin and  Dermabond glue. The patient was taken to recovery room in stable  condition postop.         Flo Bojras MD    D: 05/23/2022 13:59:23       T: 05/23/2022 14:02:57     [email or web address]  Job#: [id number]     Doc#: [id number]    CC:  Carlee Kaplan MD

## 2022-06-03 ENCOUNTER — HOSPITAL ENCOUNTER (OUTPATIENT)
Dept: RADIATION ONCOLOGY | Age: 57
Discharge: HOME | End: 2022-06-03

## 2022-06-03 NOTE — PROGRESS NOTES
1100  Two week follow-up phone call following Frameless treatment with patient has been completed. 1. Patient did not follow the last 3 days of her taper correctly. She was to stop on Thursday 6/2 and took 4mg instead of         of the 2mg for 3 days. Patient was advised to take 2mg daily for 3 days and stop on Sunday 6/5/22. 2.   Patient is reporting that her Headaches have stopped and her gait is retuning to baseline. 2.   Patient is to continue taking Keppra 500mg BID until her follow up with Dr. Modesta Dumont. 3.   Patient has been reminded of her next appointment with Dr. Modesta Dumont on 7/18/2022, MRI at 9:45am and appointment at              11am.   4.   Any other questions or further follow up will be with Dr. Modesta Dumont. Thank you for referring this patient to the [facility] Department for treatment, it has been a pleasure to participate in their care. If the patient requires future Gamma Knife procedures please contact the department directly at [phone].         Meagan Mcgee RN

## 2022-06-14 LAB
Lab: NORMAL
REPORT: NORMAL
THIS TEST SENT TO: NORMAL

## 2022-06-16 NOTE — PROGRESS NOTES
Occupational Therapy  Facility/Department: [address]  Daily Treatment Note  NAME: Caryn Eldridge  : 1965  MRN: [id number]    Date of Service: 3/13/2020    Discharge Recommendations: Caryn Eldridge scored a 17/24 on the AM-PAC ADL Inpatient form. Current research shows that an AM-PAC score of 17 or less is typically not associated with a discharge to the patient's home setting. Based on the patients AM-PAC score and their current ADL deficits, it is recommended that the patient have 5-7 sessions per week of Occupational Therapy at d/c to increase the patients independence. If patient discharges prior to next session this note will serve as a discharge summary. Please see below for the latest assessment towards goals. Patient does not have 24 hour supervision available upon discharge from hospital to maintain safety at home, is currently unaware of deficits with significantly decreased insight into need for safety. OT Equipment Recommendations  Equipment Needed: No    Assessment   Performance deficits / Impairments: Decreased functional mobility ; Decreased ADL status; Decreased cognition;Decreased safe awareness;Decreased endurance;Decreased balance;Decreased high-level IADLs  Assessment: Patient presents with the above deficits, which are below baseline, and would benefit from continued skilled OT.  Currently limited d/t significantly decreased safety awareness and insight into deficits  Treatment Diagnosis: Decreased ADLs, IADLs, transfers, mobility associated with Dizziness  Prognosis: Good;Fair  History: ETOH, alone during day  Exam: as above  Assistance / Modification: decreased safety awareness, dizziness  OT Education: OT Role;Plan of Care;ADL Adaptive Strategies;IADL Safety;Equipment;Transfer Training  Patient Education: Eval, discharge recommendations--patient with decreased safety awareness, resistant to recommendations  REQUIRES OT FOLLOW UP: Yes  Activity [facility] PRE-OPERATIVE NOTE     Subjective   Chief Complaint had concerns including Pre-Op Exam.  Surgery Date: 07/01/2022  Planned Surgery: CARPAL TUNNEL RELEASE LEFT  Type of Anesthesia: Monitor Anesthesia Care  Pre-op Diagnosis: Carpal tunnel syndrome of left wrist [G56.02]  Requesting Surgeon: Bienvenido Pritchett DO.    BRIEF HISTORY LEADING to SURGERY: Gloria Florence is a 51 year old female here for a pre-operative anesthesia clearance for surgery as requested by the surgeon.    She has emg proven carpal tunnel that requires surgery to correct and protect nerve.    She is a non diabetic pt with normal bp and :  Creatinine (mg/dL)   Date Value   01/05/2022 0.80   11/22/2019 0.65   07/17/2019 0.71     She is hypothyroid replaced:   TSH (mcUnits/mL)   Date Value   01/05/2022 56.170 (H)     She had stopped rx in past and restarted in January    rx started needs another tsh checked today   She has not been taking her thyroid medicine (again ) for at least a month. She just forgot.     SHE IS NOT CLEARED FOR SURGERY. SHE MUST DELAY. TAKE MEDICINE UNTIL EUTHYROID AND RE EVALUATE.    She has stable ecg with non specifice ST T wave changes that have been present on prior ecgs back to 2014.    Normal renal function  She has nkda  She is on zoloft for depression.  She is treated for ADD inattentive type with adderall.  She has had 3 covid shots.         Review of Systems  Constitutional: No reported fever or chills. She feels weak and swollen and no energy .   Eyes: No reported change in vision.  HENT: No reported sinus drainage/pain, ear pain, nasal congestion, nose bleeds, bleeding gums, or sore throat.  Respiratory: No reported cough or shortness of breath.   Cardiovascular: No reported chest pain, palpitations, dizziness. No reported swelling.  Gastrointestinal: No reported abdominal pain, heartburn, nausea, vomiting, diarrhea, black, or bloody stools.  Genitourinary: No reported painful urination, urinary frequency, blood in  Tolerance  Activity Tolerance: Treatment limited secondary to decreased cognition  Safety Devices  Safety Devices in place: Yes  Type of devices: All fall risk precautions in place; Patient at risk for falls;Nurse notified;Gait belt;Left in bed;Call light within reach(pt in bed with sister present)         Patient Diagnosis(es): The primary encounter diagnosis was Vertigo - ro vbi. A diagnosis of Abnormal EKG was also pertinent to this visit. has a past medical history of Hyperlipidemia, Hypertension, and Vitamin D deficiency. has a past surgical history that includes Ankle surgery (Left) and Colonoscopy. Restrictions  Restrictions/Precautions  Restrictions/Precautions: Fall Risk(High)  Required Braces or Orthoses?: No  Position Activity Restriction  Other position/activity restrictions: Rodney Quiros is a 62 y.o. female with past medical history of hyperlipidemia, hypertension and vitamin D deficiency who presents to the ED with complaint of dizziness, balance problems and slurred speech. Patient states for the past 3 weeks she has had balance problems and dizziness. Patient states when she stands up she gets dizzy like she feel like she is going to topple over. Patient states she walks and when she walks she feels she is going to fall forward. States she has to constantly grab onto things to keep her balance. Patient states she was seen by nurse practitioner in her PCP office 3 weeks ago and diagnosed with balance problems. States she was sent to a balance specialist and did 3 physical therapy sessions but states did not help her symptoms. Patient was also given Zofran which did not help. Patient states had continued symptoms and 2 days ago started noticing some difficulty with her speech. Patient states she is having difficulty forming her words and feels like she is slurring her speech. Family at bedside states her speech is off.   Saw PCP today who sent to the ED for further evaluation of urine.  Neurologic: No reported headache, facial numbness, she has  tingling or focal weakness. With carpal tunnel syndrome.  Musculoskeletal: No reported new or worsening back or neck pain. No reported calf pain.  Lymphatics: No reported painful or swollen glands.    Objective   PHYSICAL EXAM  Blood pressure 135/84, pulse 60, weight 59.8 kg (131 lb 15.1 oz), last menstrual period 04/05/2022, SpO2 100 %.  General: Alert and conversive. Periorbital swelling   Skin: Warm and dry without rash.  HEENT: Normocephalic-atraumatic. Normal conjunctivae and sclerae. Mucous membranes are moist. Normal tympanic membranes and external auditory canals bilaterally. No pharyngeal erythema or exudate. Normal nasal mucosa. Upper and lower dentures.  Neck: Trachea is midline. No adenopathy. Normal thyroid.  Cardiovascular: Symmetrical pulses. Regular rateand rhythm without murmur.  Respiratory: Normal respiratory effort. Clear to auscultation. No wheezing, rales or rhonchi.  Gastrointestinal: Soft, non-tender. Normal bowel sounds. No hepatomegaly.  No splenomegaly. No masses.  Musculoskeletal: left wrist splint No deformity or edema. No effusions.  Neurologic: CN 2-12 are intact. No focal deficits. Normal gait. No ataxia.  Psychiatric: Cooperative. Appropriate mood and affect. Normal judgment.    RESULTS REVIEW      Most Recent  Na+  142 (1/5/2022)  K+  3.6 (1/5/2022)   Glucose  94 (1/5/2022)  GFR  85 (1/5/2022)   The last eGFR was abnormal at 85 (1/5/2022).    Most Recent  WBC  6.4 (1/5/2022)  HGB  14.6 (1/5/2022)     Platelet  318 (1/5/2022)               MEDICAL, FAMILY, ALLERGY AND SOCIAL HISTORY        Problem List: has Mixed hyperlipidemia; Graves disease; Attention deficit hyperactivity disorder (ADHD), predominantly inattentive type; Vitamin D insufficiency; and Acquired hypothyroidism on their problem list.    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    She has no past medical history of Alzheimer's disease (CMS/[facility]), Anemia, Anxiety, Attention deficit disorder without mention of hyperactivity, Blood clot associated with vein wall inflammation, Cardiac failure congestive, Cerebral infarction (CMS/HCC), Chronic kidney disease, COPD (chronic obstructive pulmonary disease) (CMS/[facility]), Coronary artery disease, Diabetes mellitus (CMS/[facility]), Difficult intubation, Esophageal reflux, Essential (primary) hypertension, Failed moderate sedation during procedure, Inflammatory bowel disease, Liver disease, Malignant hyperthermia, Malignant neoplasm (CMS/HCC), Old myocardial infarction, Osteoporosis, Other chronic pain, PONV (postoperative nausea and vomiting), Senile dementia, uncomplicated (CMS/[facility]), Sleep apnea, Spinal headache, Staphylococcus aureus infection, or Unspecified otitis media.    Surgical:  has a past surgical history that includes sinus surgery proc unlisted (N/A, 01/01/1990); remove tonsils/adenoids,<11 y/o (N/A, 01/01/1979); Tonsillectomy and adenoidectomy (N/A); Eye surgery (Bilateral); sling oper stres incontinence (N/A, 01/30/2013); Gynecologic cryosurgery (N/A); Carpal tunnel release (Right, 03/12/2014); and Knee arthroscopy w/ debridement (Left, 05/25/2017).    Family: family history includes Alcohol Abuse in her maternal grandfather; Colon Polyps in her father; Genetic Disorder (age of onset: 13) in her son; Hyperlipidemia in her brother and  mother; Motor Vehicle Accident in her brother; Ophthalmology in her maternal grandmother and mother; Other in her son; Psychiatry in her mother.    Social:  reports that she has never smoked. She has never used smokeless tobacco. She reports current alcohol use. She reports that she does not use drugs.    Allergies: has No Known Allergies.       SURGICAL RISK HISTORY        Functional Capacity  Are you able to do light housework, dusting, wash dishes, climb a flight of stairs or walk up a hill without chest pain or problems breathing (>4 METS)? Yes but has been feeling weak and no energy and stopped her thyroid rx at least a month ago.     Malnutrition Screening Current weight 132 pounds  Have you recently lost weight without trying? No but she has gained weight and feels puffy   Have you been eating poorly because of a decreased appetite? No  Score = 0, Not at Risk       Family History Risks   Adverse Reaction to Anesthesia: No  Bleeding or Blood Disorder: No  Malignant Hyperthermia: No    Personal Surgical History  Anesthetic Complications: No  Problems with Surgery: No    Revised Cardiac Risk Index  Intermediate or Higher Risk Surgery No   History of Ischemic Heart Disease or Cardiac Chest Pain No   Congestive Heart Failure No;     History of Stroke or TIA No   Last Creatinine >2 No; 0.8 g/dL (01/05/2022)   Prescribed Insulin or GLP-1 No   Estimated Risk of a Major Cardiac Complication 0 risk factors = o.5%     Advance Directives Documented No       ASSESSMENT AND PLAN     Patient is not medically optimized she has not been treating her hypothyroidism and is symptomatic . We'll check tsh and do echo  If tsh not near normal I will ask to defer til TSH normal range and see if she still has carpal tunnel at which point she would be able to safely have surgery.   SHE IS NOT CLEARED FOR SURGERY. SHE MUST DELAY. TAKE MEDICINE UNTIL EUTHYROID AND RE EVALUATE.    Visit Diagnoses, Orders and Preop Risks    1. Preop  with RW to/from bathroom and in hallway, pt demonstrates 3 LoB requiring min A of therapist to correct - ~25' without RW and pt CGA with decreased arm swing, narrow Anushka, decreased step length  Bed mobility  Supine to Sit: Stand by assistance  Sit to Supine: Stand by assistance  Scooting: Stand by assistance  Comment: impulsive with transitions throughout  Transfers  Sit to stand: Contact guard assistance  Stand to sit: Contact guard assistance            Cognition  Overall Cognitive Status: Exceptions  Arousal/Alertness: Appropriate responses to stimuli  Following Commands:  Follows one step commands with repetition(due to impulsivity)  Safety Judgement: Decreased awareness of need for assistance;Decreased awareness of need for safety  Problem Solving: Decreased awareness of errors;Assistance required to correct errors made;Assistance required to identify errors made;Assistance required to implement solutions;Assistance required to generate solutions  Insights: Decreased awareness of deficits  Initiation: Requires cues for some  Sequencing: Requires cues for some            Plan   Plan  Times per week: 5-7  Times per day: Daily  Current Treatment Recommendations: Strengthening, Balance Training, Endurance Training, Functional Mobility Training, Cognitive Reorientation, Patient/Caregiver Education & Training, Home Management Training, Equipment Evaluation, Education, & procurement, Safety Education & Training, Self-Care / ADL  G-Code     OutComes Score                                                  AM-PAC Score        AM-[facility] Inpatient Daily Activity Raw Score: 17 (03/13/20 1217)  AM-PAC Inpatient ADL T-Scale Score : 37.26 (03/13/20 1217)  ADL Inpatient CMS 0-100% Score: 50.11 (03/13/20 1217)  ADL Inpatient CMS G-Code Modifier : CK (03/13/20 1217)    Goals  Short term goals  Time Frame for Short term goals: Discharge  Short term goal 1: Bed mobility mod I - SBA 3/13  Short term goal 2: Functional ADL transfers examination    2. Carpal tunnel syndrome of left wrist    3. Acquired hypothyroidism    4. Screening for nephropathy    5. Abnormal resting ECG findings      Orders Placed This Encounter   • XR Chest PA and Lateral   • Thyroid Stimulating Hormone   • Basic Metabolic Panel   • CBC with Automated Differential   • Electrocardiogram 12-Lead   • Echo M-Mode/2D/Doppler (Routine)     # Stable abnormal resting ecg findings.     #Acquired Hypothyroidism the last TSH was 56.17 (1/5/2022) in comparison the second to last result was 5.329 (12/11/2020)        She has been non compliant with rx for hypothyroid. She had gained weight and feels weak and puffy.   Will check TSH preop make sure normal/ near normal range but I suspect it will be high.   The surgery NEEDS TO BE to be post poned.     SHE IS NOT CLEARED FOR SURGERY. SHE MUST DELAY. TAKE MEDICINE UNTIL EUTHYROID AND RE EVALUATE.    Current Medications: has a current medication list which includes the following prescription(s): sertraline, levothyroxine, amphetamine-dextroamphetamine xr, amphetamine-dextroamphetamine, tramadol, ibuprofen, and ergocalciferol.    Before Surgery STOP Taking   Preop: No aspirin, Advil, Aleve, ibuprofen, naproxen the week prior to surgery.  Also hold all herbal preparations for 2 weeks prior to surgery.  Be sure to avoid high-dose vitamin E and omega-3 fatty acids as they to can cause bleeding perioperatively.      -All other chronic medications are to be continued unless an alternative plan was advised with the surgeon and/or specialist.    Jase Calvillo MD    Follow Up: No follow-ups on file.    Sent copy of note to: Dr Binevenido Pritchett, DO    ADDENDED AND RESENT 6/27. See also other notes.           supervision - CGA 3/13  Short term goal 3: Functional mobility supervision with appropriate AD - CGA/min A 3/13  Short term goal 4: UB ADLs I, LB supervision - ongoing 3/13  Long term goals  Time Frame for Long term goals : LTG=STG  Patient Goals   Patient goals : Home        Therapy Time   Individual Concurrent Group Co-treatment   Time In 0353         Time Out 1158         Minutes 30            Timed Code Treatment Minutes:  30 minutes    Total Treatment Minutes:  30 minutes    Juan Luis Bell, [address] [facility] OTR/L [id number]    Juan Luis Bell, OT

## 2022-07-18 ENCOUNTER — HOSPITAL ENCOUNTER (OUTPATIENT)
Dept: MRI IMAGING | Age: 57
Discharge: HOME | End: 2022-07-18
Payer: COMMERCIAL

## 2022-07-18 DIAGNOSIS — C79.31 BRAIN METASTASES (HCC): ICD-10-CM

## 2022-07-18 PROCEDURE — 70553 MRI BRAIN STEM W/O & W/DYE: CPT

## 2022-07-18 PROCEDURE — 6360000004 HC RX CONTRAST MEDICATION: Performed by: RADIOLOGY

## 2022-07-18 PROCEDURE — A9576 INJ PROHANCE MULTIPACK: HCPCS | Performed by: RADIOLOGY

## 2022-07-18 RX ADMIN — GADOTERIDOL 15 ML: 279.3 INJECTION, SOLUTION INTRAVENOUS at 10:49

## 2022-08-16 ENCOUNTER — FOLLOWUP TELEPHONE ENCOUNTER (OUTPATIENT)
Dept: ICU | Age: 57
End: 2022-08-16

## 2022-08-19 ENCOUNTER — HOSPITAL ENCOUNTER (OUTPATIENT)
Dept: CT IMAGING | Age: 57
Discharge: HOME | End: 2022-08-19
Payer: COMMERCIAL

## 2022-08-19 DIAGNOSIS — C34.12 MALIGNANT NEOPLASM OF UPPER LOBE OF LEFT LUNG (HCC): ICD-10-CM

## 2022-08-19 PROCEDURE — 6360000004 HC RX CONTRAST MEDICATION: Performed by: INTERNAL MEDICINE

## 2022-08-19 PROCEDURE — 74177 CT ABD & PELVIS W/CONTRAST: CPT

## 2022-08-19 RX ADMIN — IOPAMIDOL 75 ML: 755 INJECTION, SOLUTION INTRAVENOUS at 16:17

## 2022-08-19 RX ADMIN — DIATRIZOATE MEGLUMINE AND DIATRIZOATE SODIUM 20 ML: 600; 100 SOLUTION ORAL; RECTAL at 16:16

## 2022-10-05 ENCOUNTER — HOSPITAL ENCOUNTER (OUTPATIENT)
Dept: MRI IMAGING | Age: 57
Discharge: HOME | End: 2022-10-05
Payer: COMMERCIAL

## 2022-10-05 DIAGNOSIS — C79.31 METASTASIS TO BRAIN (HCC): ICD-10-CM

## 2022-10-05 PROCEDURE — 6360000004 HC RX CONTRAST MEDICATION: Performed by: NURSE PRACTITIONER

## 2022-10-05 PROCEDURE — A9576 INJ PROHANCE MULTIPACK: HCPCS | Performed by: NURSE PRACTITIONER

## 2022-10-05 PROCEDURE — 70553 MRI BRAIN STEM W/O & W/DYE: CPT

## 2022-10-05 RX ADMIN — GADOTERIDOL 14 ML: 279.3 INJECTION, SOLUTION INTRAVENOUS at 15:02

## 2023-01-19 ENCOUNTER — APPOINTMENT (OUTPATIENT)
Dept: MRI IMAGING | Age: 58
DRG: 080 | End: 2023-01-19
Payer: COMMERCIAL

## 2023-01-19 ENCOUNTER — APPOINTMENT (OUTPATIENT)
Dept: GENERAL RADIOLOGY | Age: 58
DRG: 080 | End: 2023-01-19
Payer: COMMERCIAL

## 2023-01-19 ENCOUNTER — APPOINTMENT (OUTPATIENT)
Dept: CT IMAGING | Age: 58
DRG: 080 | End: 2023-01-19
Payer: COMMERCIAL

## 2023-01-19 ENCOUNTER — HOSPITAL ENCOUNTER (INPATIENT)
Age: 58
LOS: 1 days | Discharge: HOME | DRG: 080 | End: 2023-01-20
Attending: EMERGENCY MEDICINE | Admitting: INTERNAL MEDICINE
Payer: COMMERCIAL

## 2023-01-19 DIAGNOSIS — R41.82 ALTERED MENTAL STATUS, UNSPECIFIED ALTERED MENTAL STATUS TYPE: Primary | ICD-10-CM

## 2023-01-19 DIAGNOSIS — C34.90 NON-SMALL CELL LUNG CANCER, UNSPECIFIED LATERALITY (HCC): ICD-10-CM

## 2023-01-19 PROBLEM — G93.6 CEREBRAL EDEMA (HCC): Status: ACTIVE | Noted: 2023-01-19

## 2023-01-19 LAB
A/G RATIO: 1.2 (ref 1.1–2.2)
ALBUMIN SERPL-MCNC: 4.4 G/DL (ref 3.4–5)
ALP BLD-CCNC: 102 U/L (ref 40–129)
ALT SERPL-CCNC: 50 U/L (ref 10–40)
ANION GAP SERPL CALCULATED.3IONS-SCNC: 14 MMOL/L (ref 3–16)
AST SERPL-CCNC: 42 U/L (ref 15–37)
BASOPHILS ABSOLUTE: 0 K/UL (ref 0–0.2)
BASOPHILS RELATIVE PERCENT: 0.4 %
BILIRUB SERPL-MCNC: 0.6 MG/DL (ref 0–1)
BUN BLDV-MCNC: 16 MG/DL (ref 7–20)
CALCIUM SERPL-MCNC: 10.8 MG/DL (ref 8.3–10.6)
CHLORIDE BLD-SCNC: 100 MMOL/L (ref 99–110)
CO2: 25 MMOL/L (ref 21–32)
CREAT SERPL-MCNC: 0.9 MG/DL (ref 0.6–1.1)
EOSINOPHILS ABSOLUTE: 0 K/UL (ref 0–0.6)
EOSINOPHILS RELATIVE PERCENT: 0.2 %
GFR SERPL CREATININE-BSD FRML MDRD: >60 ML/MIN/{1.73_M2}
GLUCOSE BLD-MCNC: 102 MG/DL (ref 70–99)
GLUCOSE BLD-MCNC: 103 MG/DL (ref 70–99)
GLUCOSE BLD-MCNC: 116 MG/DL (ref 70–99)
HCT VFR BLD CALC: 43.6 % (ref 36–48)
HEMOGLOBIN: 14.2 G/DL (ref 12–16)
LYMPHOCYTES ABSOLUTE: 1 K/UL (ref 1–5.1)
LYMPHOCYTES RELATIVE PERCENT: 20.1 %
MCH RBC QN AUTO: 32.3 PG (ref 26–34)
MCHC RBC AUTO-ENTMCNC: 32.5 G/DL (ref 31–36)
MCV RBC AUTO: 99.4 FL (ref 80–100)
MONOCYTES ABSOLUTE: 0.2 K/UL (ref 0–1.3)
MONOCYTES RELATIVE PERCENT: 3.3 %
NEUTROPHILS ABSOLUTE: 3.7 K/UL (ref 1.7–7.7)
NEUTROPHILS RELATIVE PERCENT: 76 %
PARATHYROID HORMONE INTACT: 29.5 PG/ML (ref 14–72)
PDW BLD-RTO: 13.2 % (ref 12.4–15.4)
PERFORMED ON: ABNORMAL
PERFORMED ON: ABNORMAL
PLATELET # BLD: 151 K/UL (ref 135–450)
PMV BLD AUTO: 8.4 FL (ref 5–10.5)
POTASSIUM REFLEX MAGNESIUM: 4.1 MMOL/L (ref 3.5–5.1)
RBC # BLD: 4.38 M/UL (ref 4–5.2)
SODIUM BLD-SCNC: 139 MMOL/L (ref 136–145)
TOTAL PROTEIN: 8.2 G/DL (ref 6.4–8.2)
WBC # BLD: 4.8 K/UL (ref 4–11)

## 2023-01-19 PROCEDURE — 2580000003 HC RX 258: Performed by: INTERNAL MEDICINE

## 2023-01-19 PROCEDURE — 83970 ASSAY OF PARATHORMONE: CPT

## 2023-01-19 PROCEDURE — 82306 VITAMIN D 25 HYDROXY: CPT

## 2023-01-19 PROCEDURE — 96375 TX/PRO/DX INJ NEW DRUG ADDON: CPT

## 2023-01-19 PROCEDURE — A9576 INJ PROHANCE MULTIPACK: HCPCS | Performed by: NURSE PRACTITIONER

## 2023-01-19 PROCEDURE — 1200000000 HC SEMI PRIVATE

## 2023-01-19 PROCEDURE — 80053 COMPREHEN METABOLIC PANEL: CPT

## 2023-01-19 PROCEDURE — 96365 THER/PROPH/DIAG IV INF INIT: CPT

## 2023-01-19 PROCEDURE — 99285 EMERGENCY DEPT VISIT HI MDM: CPT

## 2023-01-19 PROCEDURE — 70553 MRI BRAIN STEM W/O & W/DYE: CPT

## 2023-01-19 PROCEDURE — 2580000003 HC RX 258: Performed by: NURSE PRACTITIONER

## 2023-01-19 PROCEDURE — 2500000003 HC RX 250 WO HCPCS: Performed by: NURSE PRACTITIONER

## 2023-01-19 PROCEDURE — 36415 COLL VENOUS BLD VENIPUNCTURE: CPT

## 2023-01-19 PROCEDURE — 85025 COMPLETE CBC W/AUTO DIFF WBC: CPT

## 2023-01-19 PROCEDURE — 70450 CT HEAD/BRAIN W/O DYE: CPT

## 2023-01-19 PROCEDURE — 71046 X-RAY EXAM CHEST 2 VIEWS: CPT

## 2023-01-19 PROCEDURE — 6360000004 HC RX CONTRAST MEDICATION: Performed by: NURSE PRACTITIONER

## 2023-01-19 PROCEDURE — 82746 ASSAY OF FOLIC ACID SERUM: CPT

## 2023-01-19 PROCEDURE — 82542 COL CHROMOTOGRAPHY QUAL/QUAN: CPT

## 2023-01-19 PROCEDURE — 84443 ASSAY THYROID STIM HORMONE: CPT

## 2023-01-19 PROCEDURE — 6360000002 HC RX W HCPCS: Performed by: INTERNAL MEDICINE

## 2023-01-19 PROCEDURE — 82652 VIT D 1 25-DIHYDROXY: CPT

## 2023-01-19 PROCEDURE — 74177 CT ABD & PELVIS W/CONTRAST: CPT

## 2023-01-19 PROCEDURE — 6360000002 HC RX W HCPCS

## 2023-01-19 PROCEDURE — 6360000002 HC RX W HCPCS: Performed by: NURSE PRACTITIONER

## 2023-01-19 RX ORDER — ONDANSETRON 4 MG/1
4 TABLET, ORALLY DISINTEGRATING ORAL EVERY 8 HOURS PRN
Status: DISCONTINUED | OUTPATIENT
Start: 2023-01-19 | End: 2023-01-20 | Stop reason: HOSPADM

## 2023-01-19 RX ORDER — DEXAMETHASONE SODIUM PHOSPHATE 4 MG/ML
INJECTION, SOLUTION INTRA-ARTICULAR; INTRALESIONAL; INTRAMUSCULAR; INTRAVENOUS; SOFT TISSUE
Status: DISPENSED
Start: 2023-01-19 | End: 2023-01-20

## 2023-01-19 RX ORDER — ACETAMINOPHEN 325 MG/1
650 TABLET ORAL EVERY 6 HOURS PRN
Status: DISCONTINUED | OUTPATIENT
Start: 2023-01-19 | End: 2023-01-20 | Stop reason: HOSPADM

## 2023-01-19 RX ORDER — DEXAMETHASONE SODIUM PHOSPHATE 4 MG/ML
6 INJECTION, SOLUTION INTRA-ARTICULAR; INTRALESIONAL; INTRAMUSCULAR; INTRAVENOUS; SOFT TISSUE ONCE
Status: COMPLETED | OUTPATIENT
Start: 2023-01-19 | End: 2023-01-19

## 2023-01-19 RX ORDER — SODIUM CHLORIDE 0.9 % (FLUSH) 0.9 %
5-40 SYRINGE (ML) INJECTION EVERY 12 HOURS SCHEDULED
Status: DISCONTINUED | OUTPATIENT
Start: 2023-01-19 | End: 2023-01-20 | Stop reason: HOSPADM

## 2023-01-19 RX ORDER — DEXAMETHASONE SODIUM PHOSPHATE 4 MG/ML
4 INJECTION, SOLUTION INTRA-ARTICULAR; INTRALESIONAL; INTRAMUSCULAR; INTRAVENOUS; SOFT TISSUE EVERY 6 HOURS
Status: DISCONTINUED | OUTPATIENT
Start: 2023-01-19 | End: 2023-01-20 | Stop reason: HOSPADM

## 2023-01-19 RX ORDER — PANTOPRAZOLE SODIUM 40 MG/1
40 TABLET, DELAYED RELEASE ORAL
Status: DISCONTINUED | OUTPATIENT
Start: 2023-01-20 | End: 2023-01-20 | Stop reason: HOSPADM

## 2023-01-19 RX ORDER — ACETAMINOPHEN 650 MG/1
650 SUPPOSITORY RECTAL EVERY 6 HOURS PRN
Status: DISCONTINUED | OUTPATIENT
Start: 2023-01-19 | End: 2023-01-20 | Stop reason: HOSPADM

## 2023-01-19 RX ORDER — SODIUM CHLORIDE 9 MG/ML
INJECTION, SOLUTION INTRAVENOUS PRN
Status: DISCONTINUED | OUTPATIENT
Start: 2023-01-19 | End: 2023-01-20 | Stop reason: HOSPADM

## 2023-01-19 RX ORDER — INSULIN LISPRO 100 [IU]/ML
0-8 INJECTION, SOLUTION INTRAVENOUS; SUBCUTANEOUS
Status: DISCONTINUED | OUTPATIENT
Start: 2023-01-19 | End: 2023-01-20 | Stop reason: HOSPADM

## 2023-01-19 RX ORDER — ONDANSETRON 2 MG/ML
4 INJECTION INTRAMUSCULAR; INTRAVENOUS EVERY 6 HOURS PRN
Status: DISCONTINUED | OUTPATIENT
Start: 2023-01-19 | End: 2023-01-20 | Stop reason: HOSPADM

## 2023-01-19 RX ORDER — INSULIN LISPRO 100 [IU]/ML
0-4 INJECTION, SOLUTION INTRAVENOUS; SUBCUTANEOUS NIGHTLY
Status: DISCONTINUED | OUTPATIENT
Start: 2023-01-19 | End: 2023-01-20 | Stop reason: HOSPADM

## 2023-01-19 RX ORDER — ATENOLOL 50 MG/1
100 TABLET ORAL DAILY
Status: DISCONTINUED | OUTPATIENT
Start: 2023-01-20 | End: 2023-01-20 | Stop reason: HOSPADM

## 2023-01-19 RX ORDER — POLYETHYLENE GLYCOL 3350 17 G/17G
17 POWDER, FOR SOLUTION ORAL DAILY PRN
Status: DISCONTINUED | OUTPATIENT
Start: 2023-01-19 | End: 2023-01-20 | Stop reason: HOSPADM

## 2023-01-19 RX ORDER — SODIUM CHLORIDE 0.9 % (FLUSH) 0.9 %
5-40 SYRINGE (ML) INJECTION PRN
Status: DISCONTINUED | OUTPATIENT
Start: 2023-01-19 | End: 2023-01-20 | Stop reason: HOSPADM

## 2023-01-19 RX ORDER — ATORVASTATIN CALCIUM 40 MG/1
40 TABLET, FILM COATED ORAL DAILY
Status: DISCONTINUED | OUTPATIENT
Start: 2023-01-20 | End: 2023-01-20 | Stop reason: HOSPADM

## 2023-01-19 RX ORDER — SODIUM CHLORIDE 9 MG/ML
INJECTION, SOLUTION INTRAVENOUS CONTINUOUS
Status: DISCONTINUED | OUTPATIENT
Start: 2023-01-19 | End: 2023-01-20 | Stop reason: HOSPADM

## 2023-01-19 RX ADMIN — BARIUM SULFATE 900 ML: 1 SUSPENSION ORAL at 18:43

## 2023-01-19 RX ADMIN — IOPAMIDOL 75 ML: 755 INJECTION, SOLUTION INTRAVENOUS at 18:43

## 2023-01-19 RX ADMIN — GADOTERIDOL 14 ML: 279.3 INJECTION, SOLUTION INTRAVENOUS at 16:46

## 2023-01-19 RX ADMIN — SODIUM CHLORIDE: 9 INJECTION, SOLUTION INTRAVENOUS at 19:15

## 2023-01-19 RX ADMIN — SODIUM CHLORIDE, PRESERVATIVE FREE 10 ML: 5 INJECTION INTRAVENOUS at 21:08

## 2023-01-19 RX ADMIN — DEXAMETHASONE SODIUM PHOSPHATE 4 MG: 4 INJECTION, SOLUTION INTRAMUSCULAR; INTRAVENOUS at 20:56

## 2023-01-19 RX ADMIN — LEVETIRACETAM 1000 MG: 100 INJECTION, SOLUTION INTRAVENOUS at 13:43

## 2023-01-19 RX ADMIN — DEXAMETHASONE SODIUM PHOSPHATE 6 MG: 4 INJECTION, SOLUTION INTRAMUSCULAR; INTRAVENOUS at 12:38

## 2023-01-19 ASSESSMENT — LIFESTYLE VARIABLES
HOW MANY STANDARD DRINKS CONTAINING ALCOHOL DO YOU HAVE ON A TYPICAL DAY: PATIENT DOES NOT DRINK
HOW OFTEN DO YOU HAVE A DRINK CONTAINING ALCOHOL: MONTHLY OR LESS

## 2023-01-19 ASSESSMENT — PAIN - FUNCTIONAL ASSESSMENT
PAIN_FUNCTIONAL_ASSESSMENT: 0-10
PAIN_FUNCTIONAL_ASSESSMENT: NONE - DENIES PAIN

## 2023-01-19 ASSESSMENT — PAIN SCALES - GENERAL
PAINLEVEL_OUTOF10: 0
PAINLEVEL_OUTOF10: 0

## 2023-01-19 NOTE — H&P
Hospital Medicine History & Physical      PCP: No primary care provider on file. Date of Admission: 1/19/2023    Date of Service: Pt seen/examined on 1/19/2023 and Admitted to Inpatient with expected LOS greater than two midnights due to medical therapy. Chief Complaint: Short-term memory issues, fatigue      History Of Present Illness: The patient is a 59 y.o. female with medical history as below, most notable for non-small cell lung cancer with known brain metastasis and also a right iliac wing metastasis by PET scan who presents to [facility] with intermittent confusions, short-term memory issues, not acting herself for the past week. Patient's prior records were reviewed -patient was diagnosed with non-small lung cancer with brain metastasis after she had a seizure in May 2022. She underwent gamma knife radiation in May 2022. Denies any seizures since that month and has been taking Keppra at home. Patient herself has no active complaints but her work colleagues and her family did notice above changes. Patient denies any headache, no chest pain no shortness of breath no nausea vomiting. Head CT in ER showed new cerebral edema in the left frontal lobe with midline shift. Discussed with emergency room physician who reports they contacted oncology team and neurosurgical team who recommended admission for observation and further management planning.      Past Medical History:        Diagnosis Date    Balance disorder     Vestibular neuritis (?)    Brain cancer ([facility] [address]     Dizziness     Vestibular Neuritis (?)    Hyperlipidemia     Hypertension     ICH (intracerebral hemorrhage) 05/10/2022    Liver cancer ([facility] [address]     Malignant neoplasm of upper lobe of left lung (HCC)     Vitamin D deficiency        Past Surgical History:        Procedure Laterality Date    ANKLE SURGERY Left     BRONCHOSCOPY N/A 5/12/2022    BRONCHOSCOPY BIOPSY BRONCHUS performed by Raheem Noriega MD at [place] [address] COLONOSCOPY      PORT SURGERY Left 5/23/2022    PORT A CATHETER PLACEMENT performed by Moe Marin MD at [facility]       Medications Prior to Admission:    Prior to Admission medications    Medication Sig Start Date End Date Taking? Authorizing Provider   levETIRAcetam (KEPPRA) 500 MG tablet Take 2 tablets by mouth 2 times daily 5/12/22   Scott Verma MD   ondansetron (ZOFRAN-ODT) 4 MG disintegrating tablet Take 1 tablet by mouth every 8 hours as needed for Nausea or Vomiting  Patient not taking: Reported on 5/23/2022 5/12/22   Scott Verma MD   amLODIPine (NORVASC) 5 MG tablet Take 1 tablet by mouth daily  Patient not taking: Reported on 1/19/2023 5/13/22   Scott Verma MD   nicotine (NICODERM CQ) 14 MG/24HR Place 1 patch onto the skin daily for 14 days 5/27/22 6/10/22  Scott Verma MD   nicotine (NICODERM CQ) 21 MG/24HR Place 1 patch onto the skin daily for 14 days 5/12/22 5/26/22  Scott Verma MD   nicotine (NICODERM CQ) 7 MG/24HR Place 1 patch onto the skin daily for 14 days 6/11/22 6/25/22  Scott Verma MD   pantoprazole (PROTONIX) 40 MG tablet Take 1 tablet by mouth every morning (before breakfast)  Patient not taking: Reported on 1/19/2023 5/12/22   Scott Verma MD   cyclobenzaprine (FLEXERIL) 10 MG tablet TAKE 1 TABLET BY MOUTH THREE TIMES DAILY  Patient not taking: Reported on 1/19/2023 5/6/22   Historical Provider, MD   atenolol (TENORMIN) 100 MG tablet TK 1 T PO D 2/27/20   Historical Provider, MD   atorvastatin (LIPITOR) 40 MG tablet TAKE 1 TABLET EVERY DAY 4/15/15   JACQUIE Waller CNP       Allergies:  Patient has no known allergies. Social History:  The patient currently lives at home with family. TOBACCO:   reports that she has been smoking cigarettes. She has a 30.00 pack-year smoking history. She has never used smokeless tobacco.  ETOH:   reports current alcohol use of about 2.0 - 4.0 standard drinks per week.       Family History:  Reviewed in detail and Positive as follows:        Problem Relation Age of Onset    Stroke Mother     Cancer Father        REVIEW OF SYSTEMS:   Positive and negative as noted in the HPI. All other systems reviewed and negative. PHYSICAL EXAM:    /78   Pulse 81   Temp 98.3 °F (36.8 °C)   Resp 18   Ht 5' 6\" (1.676 m)   Wt 139 lb 14.4 oz (63.5 kg)   SpO2 98%   BMI 22.58 kg/m²     General appearance: No apparent distress appears stated age and cooperative. HEENT Normal cephalic, atraumatic without obvious deformity. Pupils equal, round, and reactive to light. Extra ocular muscles intact. Conjunctivae/corneas clear. Neck: Supple, No jugular venous distention/bruits. Trachea midline without thyromegaly or adenopathy with full range of motion. Lungs: Clear to auscultation, bilaterally without Rales/Wheezes/Rhonchi with good respiratory effort. Heart: Regular rate and rhythm with Normal S1/S2 without murmurs, rubs or gallops, point of maximum impulse non-displaced  Abdomen: Soft, non-tender or non-distended without rigidity or guarding and positive bowel sounds all four quadrants. Extremities: No clubbing, cyanosis, or edema bilaterally. Skin: Skin color, texture, turgor normal.  No rashes or lesions. Neurologic: Alert and oriented to self and place, needed help with date, grossly non-focal.  Mental status: Alert, oriented, thought content appropriate.   Capillary refill is brisk  Peripheral pulses 2+    CXR:  I have reviewed the CXR with the following interpretation: No pleural effusion, consolidation, there is scarring which is linear like left upper lobe  Also reviewed the brain CT personally, there is large amount of cerebral edema in the left frontal lobe as well as round circular lesion close to the cortex    The following labs reviewed personally:   CBC   Recent Labs     01/19/23  1143   WBC 4.8   HGB 14.2   HCT 43.6         RENAL  Recent Labs     01/19/23  1143      K 4.1      CO2 25   BUN 16 CREATININE 0.9     LFT'S  Recent Labs     01/19/23  1143   AST 42*   ALT 50*   BILITOT 0.6   ALKPHOS 102     COAG  No results for input(s): INR in the last 72 hours. CARDIAC ENZYMES  No results for input(s): CKTOTAL, CKMB, CKMBINDEX, TROPONINI in the last 72 hours. U/A:    Lab Results   Component Value Date/Time    COLORU DARK YELLOW 05/09/2022 06:48 PM    WBCUA 3 05/09/2022 06:48 PM    RBCUA 2 05/09/2022 06:48 PM    MUCUS 2+ 03/11/2020 05:40 PM    BACTERIA None Seen 05/09/2022 06:48 PM    CLARITYU Clear 05/09/2022 06:48 PM    SPECGRAV 1.025 05/09/2022 06:48 PM    LEUKOCYTESUR TRACE 05/09/2022 06:48 PM    BLOODU Negative 05/09/2022 06:48 PM    GLUCOSEU Negative 05/09/2022 06:48 PM    GLUCOSEU NEGATIVE 03/21/2010 11:45 PM       ABG    Lab Results   Component Value Date/Time    KYX7UVI 22.2 05/09/2022 06:48 PM    BEART -3.0 05/09/2022 06:48 PM    T3GKUOIR 99.9 05/09/2022 06:48 PM    PHART 7.361 05/09/2022 06:48 PM    WND9WYN 39.2 05/09/2022 06:48 PM    PO2ART 141.0 05/09/2022 06:48 PM    GHF1NCP 52.4 05/09/2022 06:48 PM           There are no active hospital problems to display for this patient. ASSESSMENT/PLAN:    New cerebral edema with midline shift:  Presenting with short term memory issues, confusion  Prior brain metastasis, s/p gamma knife treatment in May 2022   Seen by neurosurgery, advised on neuro-check every 4 hours, MRI brain and steroids with keppra    Non small cell lung cancer:  Stage IV with brain metastasis  On Alimta and pembrolizumab as outpatient   Consult [facility]     Mild hypercalcemia Ca 10.8:  Likely due to above. Will give gentle IV fluids for now.    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 for the opportunity to be involved in this patient's care. If you have any questions or concerns please feel free to contact me at [phone].

## 2023-01-19 NOTE — PROGRESS NOTES
4 Eyes Admission Assessment     I agree as the admission nurse that 2 RN's have performed a thorough Head to Toe Skin Assessment on the patient. ALL assessment sites listed below have been assessed on admission. Areas assessed by both nurses:   [x]   Head, Face, and Ears   [x]   Shoulders, Back, and Chest  [x]   Arms, Elbows, and Hands   [x]   Coccyx, Sacrum, and Ischium  [x]   Legs, Feet, and Heels        Does the Patient have Skin Breakdown?   No         Mohan Prevention initiated:  No   Wound Care Orders initiated:  No      [facility] nurse consulted for Pressure Injury (Stage 3,4, Unstageable, DTI, NWPT, and Complex wounds) or Mohan score 18 or lower:  No      Nurse 1 eSignature: Electronically signed by Kirk Villalpando RN on 1/19/23 at 6:48 PM EST    **SHARE this note so that the co-signing nurse is able to place an eSignature**    Nurse 2 eSignature: Electronically signed by George Mann RN on 1/20/23 at 12:28 AM EST

## 2023-01-19 NOTE — CONSULTS
NEUROSURGERY CONSULT  Abram Castorena  [id number]   1965 1/19/2023    Requesting physician: No admitting provider for patient encounter. Reason for consultation: worsening brain mets with midline shift    History of present illness: Patient is a 59 y.o. female w/ PMH of NSCLC and previously diagnosed brain metastases (multiple) who presents to ED per recommendations of her oncologist after her and  expressed concern with with memory issues and fatigue. Patient underwent 4 Gamma Knife radiation treatments in May of 2022. She is currently on Keytruda and follows with Dr. Kaitlyn Ch. Most recent MRI brain was on 10/5/22 which revealed previously identified enhancing lesion in the left frontal lobe is no longer visualized. There was decreased size of the complex hemorrhagic lesion in the superior anterior left frontal lobe and no new enhancing lesions. Patient and her  at the bedside report that for the last 3 weeks she has had issues with short term memory, mild confusion and increasing fatigue. She works from home and her boss called her  yesterday with concerns that she was having difficulty completing her normal work tasks. She is confused to what year it is. She reports sleeping more frequently. She denies headaches, visual changes, nausea, vomiting or weakness. She denies falls. A CT head wo contrast today demonstrates significant increase in vasogenic edema in left frontal lobe with MLS.        ROS:   GENERAL:  +fatigue   EYES:  Denies vision change or diplopia  EARS:  Denies hearing loss  CARDIAC:  Denies chest pain  RESPIRATORY:  Denies shortness of breath  SKIN:  Denies rash or lesions   HEM:  Denies excessive bruising  PSYCH:  Denies anxiety or depression  NEURO:  +memory impairment, + weakness,   :  Denies urinary difficulty  GI: Denies nausea, vomiting, diarrhea or constipation  MUSCULOSKELETAL:  No arthralgias    No Known Allergies    Past Medical History:   Diagnosis Date Balance disorder     Vestibular neuritis (?)    Brain cancer (HCC)     Dizziness     Vestibular Neuritis (?)    Hyperlipidemia     Hypertension     ICH (intracerebral hemorrhage) 05/10/2022    Liver cancer ([facility] [address]     Malignant neoplasm of upper lobe of left lung (HCC)     Vitamin D deficiency         Past Surgical History:   Procedure Laterality Date    ANKLE SURGERY Left     BRONCHOSCOPY N/A 5/12/2022    BRONCHOSCOPY BIOPSY BRONCHUS performed by Yolie Rodriguez MD at [facility] Left 5/23/2022    PORT A CATHETER PLACEMENT performed by Christiano Acuna MD at [address] [place] [address] History     Occupational History    Not on file   Tobacco Use    Smoking status: Every Day     Packs/day: 1.00     Years: 30.00     Pack years: 30.00     Types: Cigarettes    Smokeless tobacco: Never    Tobacco comments:     pt states she has chantix at home hasnt started yet   Vaping Use    Vaping Use: Not on file   Substance and Sexual Activity    Alcohol use: Yes     Alcohol/week: 2.0 - 4.0 standard drinks     Types: 2 - 4 Shots of liquor per week     Comment: vodka daily    Drug use: No    Sexual activity: Yes     Partners: Male        Family History   Problem Relation Age of Onset    Stroke Mother     Cancer Father         No outpatient medications have been marked as taking for the 1/19/23 encounter [facility] Encounter).       Current Facility-Administered Medications   Medication Dose Route Frequency Provider Last Rate Last Admin    dexamethasone (DECADRON) injection 6 mg  6 mg IntraVENous Once Lucrecia Gutierrez MD         Current Outpatient Medications   Medication Sig Dispense Refill    levETIRAcetam (KEPPRA) 500 MG tablet Take 2 tablets by mouth 2 times daily 60 tablet 1    ondansetron (ZOFRAN-ODT) 4 MG disintegrating tablet Take 1 tablet by mouth every 8 hours as needed for Nausea or Vomiting (Patient not taking: Reported on 5/23/2022) 30 tablet 0    amLODIPine (NORVASC) 5 MG tablet Take 1 tablet by mouth daily 30 tablet 1    nicotine (NICODERM CQ) 14 MG/24HR Place 1 patch onto the skin daily for 14 days 14 patch 0    nicotine (NICODERM CQ) 21 MG/24HR Place 1 patch onto the skin daily for 14 days 14 patch 0    nicotine (NICODERM CQ) 7 MG/24HR Place 1 patch onto the skin daily for 14 days 14 patch 0    pantoprazole (PROTONIX) 40 MG tablet Take 1 tablet by mouth every morning (before breakfast) 30 tablet 1    cyclobenzaprine (FLEXERIL) 10 MG tablet TAKE 1 TABLET BY MOUTH THREE TIMES DAILY      atenolol (TENORMIN) 100 MG tablet TK 1 T PO D      atorvastatin (LIPITOR) 40 MG tablet TAKE 1 TABLET EVERY DAY 90 tablet 0      Objective:  BP (!) 106/90   Pulse 79   Temp 98.3 °F (36.8 °C)   Resp 18   Ht 5' 6\" (1.676 m)   Wt 139 lb 14.4 oz (63.5 kg)   SpO2 97%   BMI 22.58 kg/m²     Physical Exam:  Patient seen and examined   Genera:  Alert and cooperative in no acute distress. HENT: atraumatic, neck supple  Eyes: Optic discs: Not tested  Pulmonary: unlabored respiratory effort  Cardiovascular:  Warm well perfused. No peripheral edema  Gastrointestinal: abdomen soft, NT, ND    Neurologic Exam:  Neurological:  Mental Status: Awake, alert, oriented to person/place/situation, knows it is January but stated it was 2008, then corrected to 2009.  She is able to recall current and previous president  Attention: Intact  Language: No aphasia or dysarthria noted  Sensation: Intact to all extremities to light touch  Coordination: ataxia w/ finger to nose in RUE    Cranial Nerves:  Cranial Nerves:  II: Visual acuity not tested, denies new visual changes / diplopia  III, IV, VI: PERRL, 3 mm bilaterally, EOMI, no nystagmus noted  V: Facial sensation intact bilaterally to touch  VII: Face symmetric  VIII: Hearing intact bilaterally to spoken voice  IX: Palate movement equal bilaterally  XI: Shoulder shrug equal bilaterally  XII: Tongue midline    Musculoskeletal:   Gait: Not tested   Assist devices: None   Tone: normal   Motor strength:    Right  Left    Right  Left    Deltoid  4+ 5  Hip Flex  4+ 4+   Biceps  4+ 5  Knee Extensors  5 5   Triceps  4+ 5  Knee Flexors  5 5   Wrist Ext  5 5  Ankle Dorsiflex. 5 5   Wrist Flex  5 5  Ankle Plantarflex. 5 5   Handgrip  5 5  Ext Luis E Longus  5 5   Thumb Ext  5 5         +pronator drift RUE    Radiological Findings:  XR CHEST (2 VW)   Final Result      No acute cardiopulmonary findings. CT Head W/O Contrast   Final Result      1. Prominent low-attenuation in the left frontal lobe representing significant increase in vasogenic edema suggesting worsening intracranial metastasis compared with MRI from 10/5/2022. There is associated new mass effect with 5 mm of left-to-right    midline shift. 2.  No evidence of intracranial hemorrhage. MRI BRAIN W WO CONTRAST    (Results Pending)         Labs  Recent Labs     01/19/23  1143         CO2 25   BUN 16   CREATININE 0.9   GLUCOSE 102*     Recent Labs     01/19/23  1143   WBC 4.8   RBC 4.38         Patient Active Problem List    Diagnosis Date Noted    Malignant neoplasm of upper lobe of left lung (HCC)     Poor venous access     Lung mass     Mediastinal lymphadenopathy     Intraparenchymal hemorrhage of brain ([facility] [address] 05/10/2022    Dyslipidemia     Vertigo 03/11/2020    HTN (hypertension), benign     Vitamin D deficiency        Assessment:  60 yo female w/ history of NSCLC and multiple brain mets who is s/p Gamma Knife radiation in May who presents with memory issues and fatigue. CT demonstrates significant increase in vasogenic edema in left frontal lobe with MLS.        Plan:  No emergent neurosurgical intervention indicated  Q 4 hour neuro checks  MRI brain w wo contrast-brain lab protocol to evaluate underlying source of worsening edema  Cerebral edema:  -IV Decadron given, contine 4mg q 6 hours, PPI/SSI  -Keppra 1000mg BID for seizure prophylaxis  -HOB elevated  -keep Na WNL  -no dextrose in IVFs  Oncology consult- follows with Dr. Ambreen Andrew  Thank you for consult, will follow up on MRI results. Please call with questions. Mee Kovacs, waiting MRI    JACQUIE Delcid-CNP  Neurosurgery Nurse Practitioner  [phone]    Patient was discussed with Dr. Aly Golden who agrees with above assessment and plan. Electronically signed by: JACQUIE Vallecillo NP, 1/19/2023 12:35 PM    I spent 45 minutes in the care of this patient.   Over 50% of that time was in face-to-face counseling regarding disease process, diagnostic testing, preventative measures, and answering patient and family questions

## 2023-01-19 NOTE — ED NOTES
Pt to MRI via stretcher. Oral contrast for CT provided to radiology tech with instructions to start contrast after MRI and then CT in 1 hour. Also explained to family.  Pt left ED in NAD and will go to inpatient room following MRI     Amada Du RN  01/19/23 [id number]
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
## 2023-01-19 NOTE — ED PROVIDER NOTES
ED Attending Attestation Note     Date of evaluation: 1/19/2023    This patient was seen by the resident. I have seen and examined the patient, agree with the workup, evaluation, management and diagnosis. The care plan has been discussed. I have reviewed the ECG and concur with the resident's interpretation. My assessment reveals a 68-year-old female with past medical history of lung cancer with known metastatic brain lesions who presents to the emergency department at the behest of her oncologist for further evaluation of altered mental status. History is supplemented by the patient's  and sister, who are both at bedside. As for herself, the patient has no specific complaints. However, the  and sister say that for at least the past few days and possibly for as long as the past 3 weeks she has seemed \"off. \"  They say that she has been very forgetful, is normally detailed oriented but has been having difficulty remembering tasks, has been having difficulty finding words, and  has noticed she has had an unusual gait from time to time that he describes as \"hunched over and shuffling. \"    On my evaluation the patient is afebrile, hemodynamically stable, and in no acute distress. She is alert and oriented x3, her cranial nerves are all intact, strength and sensation are grossly normal in all 4 extremities, sensation to light touch is intact throughout as well, her visual fields are intact to confrontation, pupils are equal, round, and reactive, her gait is narrow based and steady. She is also able to perform heel-to-toe walking without difficulty. However, she does demonstrate some inattention and difficulty with recall. She was able to count backwards from 20 down to 1 fluently, but was unable to recall 3 keywords after a few minutes of my evaluation. Our plan is for labs for underlying medical etiology, noncon CT head to evaluate for hemorrhage, edema, mass-effect.   We will speak with her oncologist.  Disposition is pending results of her evaluation.     Julian Islas MD  [facility]  Emergency Medicine      Julian Islas MD  01/19/23 4650
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
## 2023-01-20 VITALS
WEIGHT: 137.56 LBS | OXYGEN SATURATION: 96 % | RESPIRATION RATE: 16 BRPM | HEIGHT: 66 IN | DIASTOLIC BLOOD PRESSURE: 73 MMHG | SYSTOLIC BLOOD PRESSURE: 119 MMHG | BODY MASS INDEX: 22.11 KG/M2 | TEMPERATURE: 97.5 F | HEART RATE: 55 BPM

## 2023-01-20 PROBLEM — C79.31 BRAIN METASTASES: Status: ACTIVE | Noted: 2023-01-20

## 2023-01-20 LAB
ALBUMIN SERPL-MCNC: 4.1 G/DL (ref 3.4–5)
ANION GAP SERPL CALCULATED.3IONS-SCNC: 10 MMOL/L (ref 3–16)
BACTERIA: ABNORMAL /HPF
BILIRUBIN URINE: NEGATIVE
BLOOD, URINE: NEGATIVE
BUN BLDV-MCNC: 13 MG/DL (ref 7–20)
CALCIUM SERPL-MCNC: 10.4 MG/DL (ref 8.3–10.6)
CHLORIDE BLD-SCNC: 100 MMOL/L (ref 99–110)
CLARITY: CLEAR
CO2: 24 MMOL/L (ref 21–32)
COLOR: YELLOW
CREAT SERPL-MCNC: 0.8 MG/DL (ref 0.6–1.1)
EPITHELIAL CELLS, UA: ABNORMAL /HPF (ref 0–5)
FOLATE: >20 NG/ML (ref 4.78–24.2)
GFR SERPL CREATININE-BSD FRML MDRD: >60 ML/MIN/{1.73_M2}
GLUCOSE BLD-MCNC: 110 MG/DL (ref 70–99)
GLUCOSE BLD-MCNC: 128 MG/DL (ref 70–99)
GLUCOSE BLD-MCNC: 132 MG/DL (ref 70–99)
GLUCOSE URINE: NEGATIVE MG/DL
KETONES, URINE: NEGATIVE MG/DL
LEUKOCYTE ESTERASE, URINE: ABNORMAL
MICROSCOPIC EXAMINATION: YES
NITRITE, URINE: NEGATIVE
PERFORMED ON: ABNORMAL
PERFORMED ON: ABNORMAL
PH UA: 6 (ref 5–8)
PHOSPHORUS: 3 MG/DL (ref 2.5–4.9)
POTASSIUM SERPL-SCNC: 4.4 MMOL/L (ref 3.5–5.1)
PROTEIN UA: NEGATIVE MG/DL
RBC UA: ABNORMAL /HPF (ref 0–4)
SODIUM BLD-SCNC: 134 MMOL/L (ref 136–145)
SPECIFIC GRAVITY UA: 1.01 (ref 1–1.03)
TSH REFLEX: 0.38 UIU/ML (ref 0.27–4.2)
URINE REFLEX TO CULTURE: ABNORMAL
URINE TYPE: ABNORMAL
UROBILINOGEN, URINE: 0.2 E.U./DL
VITAMIN D 25-HYDROXY: 35.2 NG/ML
WBC UA: ABNORMAL /HPF (ref 0–5)

## 2023-01-20 PROCEDURE — 36415 COLL VENOUS BLD VENIPUNCTURE: CPT

## 2023-01-20 PROCEDURE — 97530 THERAPEUTIC ACTIVITIES: CPT

## 2023-01-20 PROCEDURE — 80069 RENAL FUNCTION PANEL: CPT

## 2023-01-20 PROCEDURE — 92507 TX SP LANG VOICE COMM INDIV: CPT

## 2023-01-20 PROCEDURE — 97535 SELF CARE MNGMENT TRAINING: CPT

## 2023-01-20 PROCEDURE — 6370000000 HC RX 637 (ALT 250 FOR IP): Performed by: NURSE PRACTITIONER

## 2023-01-20 PROCEDURE — 81001 URINALYSIS AUTO W/SCOPE: CPT

## 2023-01-20 PROCEDURE — 97116 GAIT TRAINING THERAPY: CPT

## 2023-01-20 PROCEDURE — 99232 SBSQ HOSP IP/OBS MODERATE 35: CPT | Performed by: NURSE PRACTITIONER

## 2023-01-20 PROCEDURE — 2580000003 HC RX 258: Performed by: INTERNAL MEDICINE

## 2023-01-20 PROCEDURE — 92523 SPEECH SOUND LANG COMPREHEN: CPT

## 2023-01-20 PROCEDURE — 6370000000 HC RX 637 (ALT 250 FOR IP): Performed by: INTERNAL MEDICINE

## 2023-01-20 PROCEDURE — 6360000002 HC RX W HCPCS: Performed by: INTERNAL MEDICINE

## 2023-01-20 PROCEDURE — 97162 PT EVAL MOD COMPLEX 30 MIN: CPT

## 2023-01-20 PROCEDURE — 97165 OT EVAL LOW COMPLEX 30 MIN: CPT

## 2023-01-20 RX ORDER — PANTOPRAZOLE SODIUM 40 MG/1
40 TABLET, DELAYED RELEASE ORAL
Qty: 30 TABLET | Refills: 0 | Status: SHIPPED | OUTPATIENT
Start: 2023-01-20

## 2023-01-20 RX ORDER — LEVETIRACETAM 500 MG/1
1000 TABLET ORAL 2 TIMES DAILY
Qty: 60 TABLET | Refills: 1 | Status: SHIPPED | OUTPATIENT
Start: 2023-01-20

## 2023-01-20 RX ORDER — FOLIC ACID 1 MG/1
1 TABLET ORAL DAILY
Qty: 30 TABLET | Refills: 0 | Status: SHIPPED | OUTPATIENT
Start: 2023-01-21

## 2023-01-20 RX ORDER — DEXAMETHASONE 4 MG/1
TABLET ORAL
Qty: 40 TABLET | Refills: 0 | Status: SHIPPED | OUTPATIENT
Start: 2023-01-20 | End: 2023-02-05

## 2023-01-20 RX ORDER — FOLIC ACID 1 MG/1
1 TABLET ORAL DAILY
Status: DISCONTINUED | OUTPATIENT
Start: 2023-01-20 | End: 2023-01-20 | Stop reason: HOSPADM

## 2023-01-20 RX ADMIN — DEXAMETHASONE SODIUM PHOSPHATE 4 MG: 4 INJECTION, SOLUTION INTRAMUSCULAR; INTRAVENOUS at 13:57

## 2023-01-20 RX ADMIN — SODIUM CHLORIDE: 9 INJECTION, SOLUTION INTRAVENOUS at 09:19

## 2023-01-20 RX ADMIN — PANTOPRAZOLE SODIUM 40 MG: 40 TABLET, DELAYED RELEASE ORAL at 06:54

## 2023-01-20 RX ADMIN — ATENOLOL 100 MG: 50 TABLET ORAL at 08:03

## 2023-01-20 RX ADMIN — FOLIC ACID 1 MG: 1 TABLET ORAL at 09:24

## 2023-01-20 RX ADMIN — DEXAMETHASONE SODIUM PHOSPHATE 4 MG: 4 INJECTION, SOLUTION INTRAMUSCULAR; INTRAVENOUS at 02:53

## 2023-01-20 RX ADMIN — LEVETIRACETAM 1000 MG: 100 INJECTION, SOLUTION INTRAVENOUS at 13:57

## 2023-01-20 RX ADMIN — LEVETIRACETAM 1000 MG: 100 INJECTION, SOLUTION INTRAVENOUS at 02:56

## 2023-01-20 RX ADMIN — DEXAMETHASONE SODIUM PHOSPHATE 4 MG: 4 INJECTION, SOLUTION INTRAMUSCULAR; INTRAVENOUS at 08:07

## 2023-01-20 RX ADMIN — SODIUM CHLORIDE, PRESERVATIVE FREE 10 ML: 5 INJECTION INTRAVENOUS at 08:03

## 2023-01-20 RX ADMIN — ATORVASTATIN CALCIUM 40 MG: 40 TABLET, FILM COATED ORAL at 08:03

## 2023-01-20 ASSESSMENT — ENCOUNTER SYMPTOMS
RESPIRATORY NEGATIVE: 1
GASTROINTESTINAL NEGATIVE: 1

## 2023-01-20 NOTE — PLAN OF CARE
Problem: Neurosensory - Adult  Goal: Absence of seizures  Outcome: Progressing     Problem: Neurosensory - Adult  Goal: Remains free of injury related to seizures activity  Outcome: Progressing     Problem: Safety - Adult  Goal: Free from fall injury  Outcome: Progressing     Problem: Pain  Goal: Verbalizes/displays adequate comfort level or baseline comfort level  Outcome: Progressing

## 2023-01-20 NOTE — CARE COORDINATION
CM spoke with patient at bedside along with spouse, Krystian Jones. Patient is from home with spouse, independent pta, no DME needs, drives self. No CM needs at this time. Spouse to transport home.        Ochoa Muro, RN, BSN,   4th Floor Progressive Care Unit  [phone]

## 2023-01-20 NOTE — PROGRESS NOTES
Occupational Therapy  Facility/Department[facility]  Occupational Therapy Initial Assessment/tx    Name: Vera Castañeda  : 1965  MRN: [id number]  Date of Service: 2023    Discharge Recommendations:  Vera Castañeda scored a 21/24 on the AM-PAC ADL Inpatient form. Current research shows that an AM-PAC score of 18 or greater is typically associated with a discharge to the patient's home setting. Based on the patient's AM-PAC score, and their current ADL deficits, it is recommended that the patient have 2-3 sessions per week of Occupational Therapy at d/c to increase the patient's independence. At this time, this patient demonstrates the endurance and safety to discharge home with home OTand a follow up treatment frequency of 2-3x/wk. Please see assessment section for further patient specific details. If patient discharges prior to next session this note will serve as a discharge summary. Please see below for the latest assessment towards goals. OT Equipment Recommendations  Equipment Needed: No       Patient Diagnosis(es): The primary encounter diagnosis was Altered mental status, unspecified altered mental status type. A diagnosis of Non-small cell lung cancer, unspecified laterality ([address] was also pertinent to this visit. Past Medical History:  has a past medical history of Balance disorder, Brain cancer ([address] Dizziness, Hyperlipidemia, Hypertension, ICH (intracerebral hemorrhage), Liver cancer ([address] Malignant neoplasm of upper lobe of left lung ([address] and Vitamin D deficiency. Past Surgical History:  has a past surgical history that includes Ankle surgery (Left); Colonoscopy; bronchoscopy (N/A, 2022); and Port Surgery (Left, 2022). Treatment Diagnosis: impaired ADLs and transfers      Assessment   Performance deficits / Impairments: Decreased functional mobility ; Decreased ADL status; Decreased coordination  Assessment: Pt admitted with confusion and fatigue for previous 3 days, dx of new cerebral edema. She needs SBA for LE dressing and CGA for functional transfers/mobility without A device. Pt has impaired R UE gross coor. Recommend discharge to home with initial 24 hr A, home OT vs OP OT. Treatment Diagnosis: impaired ADLs and transfers  Prognosis: Good  Decision Making: Low Complexity  REQUIRES OT FOLLOW-UP: Yes  Activity Tolerance  Activity Tolerance: Patient Tolerated treatment well        Plan   Occupational Therapy Plan  Times Per Week: 5-7  Current Treatment Recommendations: Balance training, Functional mobility training, Safety education & training, Self-Care / ADL, Endurance training     Restrictions  Restrictions/Precautions  Restrictions/Precautions: Fall Risk (high fall risk)  Position Activity Restriction  Other position/activity restrictions: elevate head of bed 30 degrees, up as tolerated, adult diet - regular    Subjective   General  Chart Reviewed: Yes  Additional Pertinent Hx: PMH:  balance disorder, brain cancer, HTN, ICH, hyperlipidemia, L ankle surgery  Family / Caregiver Present: Yes ()  Referring Practitioner: Dr. Anamaria Almazan  Diagnosis: Pt admitted with confusion and fatigue for previous 3 days, dx of new cerebral edema-head CT neg, incresaed vasogenic edema, CXR=neg, mary MRI=Progressive/recurrent enhancing tumor in the left frontal lobe metastatic lesion with extensive regional vasogenic edema resulting in localized mass effect and 6 mm of left-to-right midline shift, chest/abd CT=persistent metastatic lymphadenopathty, srable small osteolytic met R iliac wing  Subjective  Subjective: Pt in bed, agreeable to work with OT.      Social/Functional History  Social/Functional History  Lives With: Spouse  Type of Home: House  Home Layout: Two level, 1/2 bath on main level, Bed/Bath upstairs  Home Access: Stairs to enter with rails  Entrance Stairs - Number of Steps: 8 stairs to enter home; full flight to upstairs bedroom with 1 rail  Bathroom Shower/Tub: Tub/Shower unit  Bathroom Toilet: Standard (sink adjacent to toilet)  Bathroom Equipment:  (None)  Home Equipment: Walker, rolling  ADL Assistance: Independent  Homemaking Assistance: Independent  Ambulation Assistance: Independent (including community mobility without an assistive device)  Transfer Assistance: Independent  Active : Yes  Occupation: Full time employment  Type of Occupation:  for [employer]       Objective           Safety Devices  Type of Devices: Chair alarm in place;Call light within reach; Left in chair;Nurse notified  Restraints  Restraints Initially in Place: No  Balance  Sitting: Intact  Standing:  (SBA/S for safety)  Gait  Overall Level of Assistance:  (Functional mobility without A device, 2 episodes of loss of balance with CGA to recover, moves quickly)  Toilet Transfers  Toilet - Technique: Ambulating (without A device, CGA, slightly unsteady and impulsive to/from bathroom)  Equipment Used: Standard toilet  Toilet Transfer: Stand by assistance  AROM:  (increased time for R UE-otherwise WFL)  Strength:  (L UE=WFL, R UE=4-/5)  Coordination:  (gross coor impaired in R UE)  ADL  Grooming: Verbal cueing (pt washing hands at sink, continually drying with wet paper towel until cued to use dry one)  LE Dressing: Stand by assistance ([facility] pants)     Activity Tolerance  Activity Tolerance: Patient tolerated evaluation without incident  Activity Tolerance Comments: limited by delayed processing, impulsive tendencies, decreased awareness of deficits  Bed mobility  Supine to Sit: Supervision  Scooting: Supervision  Transfers  Sit to stand: Stand by assistance  Stand to sit: Stand by assistance  Vision  Vision Exceptions: Wears glasses for distance  Hearing  Hearing: Within functional limits  Cognition  Overall Cognitive Status: Exceptions  Arousal/Alertness: Delayed responses to stimuli (increased time for processing)  Following Commands:  Follows multistep commands with increased time  Attention Span: Attends with cues to redirect  Safety Judgement: Decreased awareness of need for assistance  Problem Solving: Assistance required to generate solutions;Decreased awareness of errors  Insights: Decreased awareness of deficits  Sequencing: Requires cues for some  Cognition Comment: Pt is impulsive and  has word finding difficulties.   Orientation  Overall Orientation Status: Impaired  Orientation Level: Oriented to situation;Oriented to place;Oriented to person (cues for word finding to state correct date)                  Education Given To: Patient  Education Provided: Role of Therapy;Plan of Care  Education Method: Verbal  Education Outcome: Verbalized understanding           Hand Dominance  Hand Dominance: Right            G-Code     OutComes Score                                                  AM-PAC Score        AM-PAC Inpatient Daily Activity Raw Score: 21 (01/20/23 1336)  AM-PAC Inpatient ADL T-Scale Score : 44.27 (01/20/23 1336)  ADL Inpatient CMS 0-100% Score: 32.79 (01/20/23 1336)  ADL Inpatient CMS G-Code Modifier : CJ (01/20/23 1336)           Goals  Short Term Goals  Time Frame for Short Term Goals: discharge  Short [facility] Goal [facility]  Short Term Goal 2: pt to manage toileting indep  Short Term Goal 3: pt to retrieve clothes and dress lower body indep  Short Term Goal 4: pt to do R UE coor tasks/exerc to increase functional status  Patient Goals   Patient goals : not stated       Therapy Time   Individual Concurrent Group Co-treatment   Time In 1202         Time Out 1240         Minutes 38             Timed Code Treatment Minutes:   23    Total Treatment Minutes:   ALDO Granado/L [address]

## 2023-01-20 NOTE — PROGRESS NOTES
Physical Therapy  Facility/Department: [facility]  Physical Therapy Initial Assessment    Name: Liane Apley  : 1965  MRN: [id number]  Date of Service: 2023    Discharge Recommendations:  Liane Apley scored a 20/24 on the AM-PAC short mobility form. Current research shows that an AM-PAC score of 18 or greater is typically associated with a discharge to the patient's home setting. Based on the patient's AM-PAC score and their current functional mobility deficits, it is recommended that the patient have 2-3 sessions per week of Physical Therapy at d/c to increase the patient's independence. At this time, this patient demonstrates the endurance and safety to discharge home with 24 hour (A) and home PT vs outpatient PT and a follow up treatment frequency of 2-3x/wk. Please see assessment section for further patient specific details. If patient discharges prior to next session this note will serve as a discharge summary. Please see below for the latest assessment towards goals. PT Equipment Recommendations  Equipment Needed: No      Patient Diagnosis(es): The primary encounter diagnosis was Altered mental status, unspecified altered mental status type. A diagnosis of Non-small cell lung cancer, unspecified laterality ([address] was also pertinent to this visit. Past Medical History:  has a past medical history of Balance disorder, Brain cancer ([address] Dizziness, Hyperlipidemia, Hypertension, ICH (intracerebral hemorrhage), Liver cancer ([address] Malignant neoplasm of upper lobe of left lung ([address] and Vitamin D deficiency. Past Surgical History:  has a past surgical history that includes Ankle surgery (Left); Colonoscopy; bronchoscopy (N/A, 2022); and Port Surgery (Left, 2022). Assessment   Body Structures, Functions, Activity Limitations Requiring Skilled Therapeutic Intervention: Decreased functional mobility ; Decreased strength;Decreased safe awareness;Decreased cognition;Decreased endurance;Decreased balance;Decreased coordination  Assessment: Patient demonstrates impaired functional mobility, now requiring CGA for safe standing mobility including gait and transfers. Patient limited by balance deficits, decreased safety awareness, impulsive tendencies, and coordination deficits during functional task - including odd sequencing of hand washing, wetting paper towels in sink to then dry her hands with. Patient will continue to benefit from additional skilled PT intervention to facilitate safe mobility and to optimize (I) to promote return to prior level of function.   Treatment Diagnosis: impaired functional mobility  Therapy Prognosis: Good;Guarded  Decision Making: Medium Complexity  Barriers to Learning: decreased safety awareness, decreased awareness of deficits, questionable insight  Requires PT Follow-Up: Yes  Activity Tolerance  Activity Tolerance: Patient tolerated evaluation without incident  Activity Tolerance Comments: limited by delayed processing, impulsive tendencies, decreased awareness of deficits     Plan   Physcial Therapy Plan  General Plan:  (2-5x/week)  Current Treatment Recommendations: Strengthening, Balance training, Functional mobility training, Transfer training, Endurance training, Gait training, Stair training, Neuromuscular re-education, Cognitive reorientation, Home exercise program, Safety education & training, Patient/Caregiver education & training, Equipment evaluation, education, & procurement, Positioning  Safety Devices  Type of Devices: Call light within reach, Chair alarm in place, Gait belt, Left in chair, Nurse notified  Restraints  Restraints Initially in Place: No     Restrictions  Restrictions/Precautions  Restrictions/Precautions: Fall Risk (high fall risk)  Position Activity Restriction  Other position/activity restrictions: elevate head of bed 30 degrees, up as tolerated, adult diet - regular     Subjective   General  Chart Reviewed: Yes  Additional Pertinent Hx: ED 1/19 related to altered mental status, increased confusion, unsteady gait; CT: Prominent low-attenuation in the left frontal lobe representing significant increase in vasogenic edema suggesting worsening intracranial metastasis compared with MRI from 10/5/2022. There is associated new mass effect with 5 mm of left-to-right midline shift. MRI: 1. Progressive/recurrent enhancing tumor in the left frontal lobe metastatic lesion with extensive regional vasogenic edema resulting in localized mass effect and 6 mm of left-to-right midline shift. No significant change in the appearance of the   pre-existing intralesional hemorrhage. 2.  Slightly larger 5 mm enhancing lesion within the left cerebellar vermis consistent with metastasis. 3.  No change in the residual small hemorrhagic lesion in the left temporal lobe without associated enhancement or edema. Family / Caregiver Present: Yes ()  Referring Practitioner: Benjie Hernandez MD  Referral Date : 01/19/23  Diagnosis: cerebral edema  Follows Commands: Impaired  Other (Comment): increased time for processing, impulsive tendencies  General Comment  Comments: Patient supine in bed upon arrival - agreeable to PT. Subjective  Subjective: Patient denies pain, reports \"feeling good\" but also endorsing word finding difficulty and balance deficits when prompted. Patient's  vocal related to advocating for patient and awaiting medical plan - RN notified.          Social/Functional History  Social/Functional History  Lives With: Spouse  Type of Home: House  Home Layout: Two level, 1/2 bath on main level, Bed/Bath upstairs  Home Access: Stairs to enter with rails  Entrance Stairs - Number of Steps: 8 stairs to enter home; full flight to upstairs bedroom with 1 rail  Bathroom Shower/Tub: Tub/Shower unit  Bathroom Toilet: Standard (sink adjacent to toilet)  Bathroom Equipment:  (None)  Home Equipment: Walker, rolling  ADL Assistance: Independent  Homemaking Assistance: Independent  Ambulation Assistance: Independent (including community mobility without an assistive device)  Transfer Assistance: Independent  Active : Yes  Occupation: Full time employment  Type of Occupation:  for [address] [place] [address] Exceptions: Wears glasses for distance  Hearing  Hearing: Within functional limits    Cognition   Orientation  Overall Orientation Status: Impaired  Orientation Level: Oriented to situation;Oriented to place;Oriented to person (cues for word finding to state correct date)  Cognition  Overall Cognitive Status: Exceptions  Arousal/Alertness: Delayed responses to stimuli (increased time for processing)  Following Commands: Follows multistep commands with increased time  Attention Span: Attends with cues to redirect  Safety Judgement: Decreased awareness of need for assistance  Problem Solving: Assistance required to generate solutions;Decreased awareness of errors  Insights: Decreased awareness of deficits  Sequencing: Requires cues for some  Cognition Comment: Pt is impulsive and  has word finding difficulties.      Objective      Gross Assessment  Sensation: Intact (denies numbness or tingling)     AROM RLE (degrees)  RLE AROM: WFL  AROM LLE (degrees)  LLE AROM : WFL  Strength RLE  Comment: grossly 3+/5  Strength LLE  Comment: grossly 4+/5        Balance  Sitting: Intact  Standing:  (SBA/S for safety)  Gait  Overall Level of Assistance:  (Functional mobility without A device, 2 episodes of loss of balance with CGA to recover, moves quickly)  Bed mobility  Supine to Sit: Supervision  Scooting: Supervision (seated at edge of bed)  Transfers  Sit to Stand: Stand by assistance  Stand to Sit: Stand by assistance  Stand Pivot Transfers: Contact guard assistance (toilet transfer with CGA)  Comment: impulsive tendencies, decreased awareness of balance deficits  Ambulation  Surface: Level tile  Device: No Device  Assistance: Contact guard assistance  Quality of Gait: impulsive tendencies, decreased right stance time, decreased right foot clearance; delayed balance correction strategies  Distance: 350ft  Stairs/Curb  Stairs?: Yes  Stairs  # Steps :  (2 6\" and 3 4\")  Rails: Bilateral  Assistance: Contact guard assistance  Comment: reciprocal pattern to ascend/descend     Balance  Posture: Good  Sitting - Static: Good  Sitting - Dynamic: Good  Standing - Static: Good;-  Standing - Dynamic: Fair;+  Comments: CGA for standing balance during mobility without an assistive device     AM-PAC Score  AM-PAC Inpatient Mobility Raw Score : 20 (01/20/23 1337)  AM-PAC Inpatient T-Scale Score : 47.67 (01/20/23 1337)  Mobility Inpatient CMS 0-100% Score: 35.83 (01/20/23 1337)  Mobility Inpatient CMS G-Code Modifier : CJ (01/20/23 1337)     Goals  Short Term Goals  Time Frame for Short Term Goals: discharge  Short Term Goal 1: Pt. will demonstrate (I) bed mobility  Short Term Goal 2: Pt. will demonstrate sit <-> stand (I)  Short Term Goal 3: Pt. will demonstrate stand pivot (I)  Short Term Goal 4: Pt. will ambulate >/= 350ft with SBA and LRAD  Short Term Goal 5: Pt. will negotiate >/= 12 stairs with 1 rail and SBA  Patient Goals   Patient Goals : \"to be back to normal\"       Education  Patient Education  Education Given To: Patient  Education Provided: Role of Therapy;Plan of Care  Education Provided Comments: use of call light, PT recommendations  Education Method: Demonstration;Verbal  Barriers to Learning: Cognition  Education Outcome: Continued education needed      Therapy Time   Individual Concurrent Group Co-treatment   Time In 1202         Time Out 1240         Minutes 38                 Timed Code Treatment Minutes: 23 minutes    Total Treatment Minutes: 38 Minutes    If patient discharges prior to next treatment, this note will serve as discharge summary.     Abby Nguyen PT, DPT #[id number]

## 2023-01-20 NOTE — PROGRESS NOTES
Pt discharged to home with family. IV was taken out by patient because she \"was ready to go\", no bleeding at site. All belongings with pt. Meds were picked up by pt's .

## 2023-01-20 NOTE — PROGRESS NOTES
Speech Language Pathology  Facility/Department: [facility] 5T ORTHO/NEURO  Initial Speech/Language/Cognitive Assessment    NAME: Adán Brewster  : 1965   MRN: [id number]  ADMISSION DATE: 2023  ADMITTING DIAGNOSIS: has HTN (hypertension), benign; Vitamin D deficiency; Vertigo; Dyslipidemia; Intraparenchymal hemorrhage of brain ([address] Lung mass; Mediastinal lymphadenopathy; Malignant neoplasm of upper lobe of left lung ([address] Poor venous access; and Cerebral edema ([address] on their problem list.  DATE ONSET: 23    Date of Eval: 2023   Evaluating Therapist: ROMERO Saucedo    RECENT RESULTS  CT OF HEAD/MRI: 20  Impression       1. Progressive/recurrent enhancing tumor in the left frontal lobe metastatic lesion with extensive regional vasogenic edema resulting in localized mass effect and 6 mm of left-to-right midline shift. No significant change in the appearance of the    pre-existing intralesional hemorrhage. 2.  Slightly larger 5 mm enhancing lesion within the left cerebellar vermis consistent with metastasis. 3.  No change in the residual small hemorrhagic lesion in the left temporal lobe without associated enhancement or edema. Primary Complaint: None stated    Pain:  Denied    Vision/ Hearing  Vision  Vision: Impaired  Vision Exceptions: Wears glasses for distance  Hearing  Hearing: Within functional limits    Assessment:  Cognitive Diagnosis: Mild cognitive-linguistic impairments  Aphasia Diagnosis: Expressive aphasia   Diagnosis: Pt presents with mild expressive aphasia with deficits in the area of confrontational naming, following multi-unit/complex verbal directions, and pt with moderate perseverations. Deficits also include difficulty with divergent and convergent naming skills. Pt also demonstrated semantic paraphasias during confrontational naming task. Numeric reasoning appeared to also be impaired as pt with difficulty completing basic calculations.  She did demonstrate repetition skills, as well as ability to provide a picture description with adeqaute and appropriate connected speech, and was stimuable with phonemic and semantic cues. Pt also with mild cognitive-linguistic impairments evidenced by pt's ability to self-monitor, but unable to self-correct consistently. Decreased insight to deficits and demonstrated reduced safety awareness with impulsivity as pt was attempting to self-transfer out of bed despite education provided re: RN call light. SLP services are recommended at this time to target the deficits listed. Recommendations:  Recommendations  Requires SLP Intervention: Yes  Patient Education: Pt educated to role of SLP, rationale for visit, skills assessed, results and recommendations. Patient Education Response: Verbalizes understanding;Needs reinforcement  Timed Code Treatment Minutes: 0 Minutes  Total Treatment Time: 30             Plan:   Individuals consulted  Consulted and agree with results and recommendations: Patient  Safety Devices  Safety Devices in place: Yes  Type of devices: All fall risk precautions in place; Bed alarm in place;Call light within reach; Other (comment) (reinforced use of call light as pt unable to determine how to call RN)  Restraints Initially in Place: No    Goals:  Short Term Goals  Goal 1: Pt will complete multi-step/complex commands with 80% accuracy given min cues. Given repetition of commands, pt unable to complete complex commands independently, however, attempted with correct objects listed. Goal 2: Pt will complete basic to complex naming tasks with 80% accuracy. Attempted to improve accuracy of divergent naming by providing MAX phonemic and semantic cues. Pt able to generate x3 words compared to x1 word independently. Goal 3: Pt will demonstrate <5 perseverations. Pt with reduced awareness at beginning of session; after education provided, pt stated at end of session \"I keep just saying that. \"  Goal 4: Pt will participate in ongoing cognitive-linguistic assessment.    Patient/family involved in developing goals and treatment plan: yes, patient    Subjective:   Previous level of function and limitations:      Social/Functional History  Occupation: Full time employment  Type of Occupation:  for [address] [place] [address]: Impaired  Vision Exceptions: Wears glasses for distance  Hearing  Hearing: Within functional limits           Objective:       Oral Motor   Labial: No impairment  Oral Hygiene Comments: mild white coating on lingual surface  Lingual: No impairment    Motor Speech  Apraxic Characteristics: None  Dysarthric Characteristics: None  Overall Impairment Severity: None    Auditory Comprehension  Comprehension: Exceptions  Basic Questions: WFL  Complex Questions: Mild  One Step Commands: WFL  Two Step Commands: WFL  Multistep Commands: WFL  Complex/Abstract Commands: Mild  Common Objects: Mild  Pictures: WFL  Conversation: Mild         Expression  Primary Mode of Expression: Verbal    Verbal Expression  Verbal Expression: Exceptions to functional limits  Initiation: Mild   Repetition: WFL  Confrontation: Mild  Divergent: Mild  Conversation: Mild  Interfering Components: Perseverations;Paraphasia    Written Expression  Dominant Hand: Right  Written Expression:  (only assessed basic personal information)         Pragmatics/Social Functioning  Pragmatics: Within functional limits    Cognition:      Orientation  Overall Orientation Status: Impaired  Orientation Level: Oriented to person;Oriented to place;Oriented to situation;Oriented to time (pt required cues for word finding for orientation to date)  Attention  Attention: Exceptions to [facility]  Alternating Attention: Mild  Selective Attention: Mild  Sustained Attention: WFL  Memory  Memory: Exceptions to [facility]  Short-term Memory:  (difficult to determine if due to reduced STM vs aphasia- pt endorsed word finding difficulties > memory)  Working Memory: WFL  Safety/Judgment  Safety/Judgment: Exceptions to [facility]  Unable to Self-monitor and Self-correct Consistently: Mild (pt able to self-monitor, but unable to correct)  Insight: Mild    Impulsive: Mild  Flexibility of Thought: Mild           Prognosis:  Individuals consulted  Consulted and agree with results and recommendations: Patient    Education:  Patient Education: Pt educated to role of SLP, rationale for visit, skills assessed, results and recommendations. Patient Education Response: Verbalizes understanding;Needs reinforcement  Safety Devices in place: Yes  Type of devices: All fall risk precautions in place; Bed alarm in place;Call light within reach; Other (comment) (reinforced use of call light as pt unable to determine how to call RN)    Therapy Time:   Individual Concurrent Group Co-treatment   Time In 0825 0000 0000 0000   Time Out 0855 0000 0000 0000   Minutes 30 0 0 0   Variance: 0  Timed Code Treatment Minutes: 0 Minutes  Total Treatment Time: 30 (20 dx, 10 tx)    Electronically signed by ROMERO Zimmer on 1/20/2023 at 9:27 AM

## 2023-01-20 NOTE — PROGRESS NOTES
Comprehensive Nutrition Assessment    RECOMMENDATIONS:  PO Diet: Continue regular diet  ONS: n/a  Nutrition Education: Education not indicated   Reassess malnutrition on follow up. NUTRITION ASSESSMENT:   Nutritional summary & status: MST 3. Pt occupied x2 during attmepted RD visits. Per nutrition screen, pt had decreased appetite pta and documentation shows 7 lbs wt loss in 8 months. Pt with no intakes recorded currently. Pt to be discharged today. Will follow as high risk pt to reassess malnutrition should hospital stay be extended. No nutrition intervention at this time. Admission/PMH: Hx of non-small cell lung cancer with known brain metastasis and also a right iliac wing metastasis by PET scan who presents to [facility] with intermittent confusions, short-term memory issues, not acting herself for the past week    MALNUTRITION ASSESSMENT  Context of Malnutrition: Chronic Illness   Malnutrition Status: Insufficient data    NUTRITION DIAGNOSIS   Inadequate oral intake related to early satiety as evidenced by poor intake prior to admission    Nutrition Monitoring and Evaluation:   Food/Nutrient Intake Outcomes:  Food and Nutrient Intake  Physical Signs/Symptoms Outcomes:  Biochemical Data, Nutrition Focused Physical Findings, Weight     OBJECTIVE DATA: Significant to nutrition assessment  Nutrition Related Findings: no edema, 134 Na, 128 glucose  Wounds: None  Nutrition Goals: Meet at least 75% of estimated needs, by next RD assessment     [facility] DIET;  Regular  PO Intake: Unable to assess   PO Supplement Intake:None Ordered  Additional Sources of Calories/IVF:      ANTHROPOMETRICS  Current Height: 5' 6\" (167.6 cm)  Current Weight: 137 lb 9 oz (62.4 kg)    Admission weight: 135 lb (61.2 kg)  Ideal Body Weight (IBW): 130 lbs  (59 kg)    Usual Bodyweight     BMI: 22.2    COMPARATIVE STANDARDS  Energy (kcal):  1560 - 1872 (25 - 30 kcal/kg)     Protein (g):  62 - 75 (1-1.2 g/kg) Fluid (mL/day):  1560 - 1872 (1ml/kcal) or per provider    The patient will be monitored per nutrition standards of care. Consult dietitian if additional nutrition interventions are needed prior to RD reassessment.      [facility], [address] [place] [address]:  [phone]  Office:  [phone]

## 2023-01-20 NOTE — PLAN OF CARE
Problem: SLP Adult - Impaired Communication  Goal: By Discharge: Demonstrates communication skills at highest level of function for planned discharge setting. See evaluation for individualized goals. Outcome: Progressing     Problem: SLP Adult - Disturbed Thought Process  Goal: By Discharge: Demonstrates cognitive skills at highest level of function for planned discharge setting. See evaluation for individualized goals. Outcome: Progressing       SLP completed evaluation; please refer to note in EMR.        Meghan Chung, [address] [facility]  Speech-Language Pathologist Speaking Coherently

## 2023-01-20 NOTE — PROGRESS NOTES
NEUROSURGERY PROGRESS NOTE    Haley Sutton   [id number]   1965 1/20/2023    Interval History:  Hospital Day #1    Subjective: resting in bed,  at bedside. Says she is disoriented and needs her  to be here to listen to plan. She has no complaints of pain. Objective:  /81   Pulse 62   Temp 98.2 °F (36.8 °C) (Oral)   Resp 16   Ht 5' 6\" (1.676 m)   Wt 137 lb 9 oz (62.4 kg)   SpO2 97%   BMI 22.20 kg/m²     Labs:  Recent Labs     01/19/23  1143 01/20/23  0609    134*    100   CO2 25 24   BUN 16 13   CREATININE 0.9 0.8   GLUCOSE 102* 132*     Recent Labs     01/19/23  1143   WBC 4.8   RBC 4.38     CT CHEST ABDOMEN PELVIS W CONTRAST Additional Contrast? Oral   Final Result      CHEST      1. Stable residual nodule in the left upper lobe. 2.  Other small nonspecific left upper lobe nodules also stable. 3.  Persistent metastatic lymphadenopathy in the left hilum and mediastinum. Reference right paratracheal lymph node is slightly larger, remainder not changed significantly. ABDOMEN AND PELVIS      1.  Stable small osteolytic metastasis in the right iliac wing. 2.  Mild diffuse clonic wall thickening may be underdistention. Colitis is not excluded. MRI BRAIN W WO CONTRAST   Final Result   Addendum (preliminary) 1 of 1   ADDENDUM #1\   Addendum to technique: Study was performed with and without 14 mL ProHance    IV contrast.         Final      1. Progressive/recurrent enhancing tumor in the left frontal lobe metastatic lesion with extensive regional vasogenic edema resulting in localized mass effect and 6 mm of left-to-right midline shift. No significant change in the appearance of the    pre-existing intralesional hemorrhage. 2.  Slightly larger 5 mm enhancing lesion within the left cerebellar vermis consistent with metastasis. 3.  No change in the residual small hemorrhagic lesion in the left temporal lobe without associated enhancement or edema. XR CHEST (2 VW)   Final Result      No acute cardiopulmonary findings. CT Head W/O Contrast   Final Result      1. Prominent low-attenuation in the left frontal lobe representing significant increase in vasogenic edema suggesting worsening intracranial metastasis compared with MRI from 10/5/2022. There is associated new mass effect with 5 mm of left-to-right    midline shift. 2.  No evidence of intracranial hemorrhage. Neurologic Exam:  GCS:  4 - Opens eyes on own  5 - Alert and oriented  6 - Follows simple motor commands    Mental Status: Awake, alert, oriented x 4, difficulty recalling details of events leading to hospitalization   Attention: Intact  Language: No aphasia or dysarthria noted  Sensation: Intact to all extremities to light touch  Coordination: ataxia w/ finger to nose in RUE     Cranial Nerves:  Cranial Nerves:  II: Visual acuity not tested, denies new visual changes / diplopia  III, IV, VI: PERRL, 3 mm bilaterally, EOMI, no nystagmus noted  V: Facial sensation intact bilaterally to touch  VII: Face symmetric  VIII: Hearing intact bilaterally to spoken voice  IX: Palate movement equal bilaterally  XI: Shoulder shrug equal bilaterally  XII: Tongue midline     Musculoskeletal:   Gait: Not tested   Assist devices: None   Tone: normal   Motor strength:     Right  Left      Right  Left    Deltoid  4+ 5   Hip Flex  4+ 4+   Biceps  4+ 5   Knee Extensors  5 5   Triceps  4+ 5   Knee Flexors  5 5   Wrist Ext  5 5   Ankle Dorsiflex. 5 5   Wrist Flex  5 5   Ankle Plantarflex. 5 5   Handgrip  5 5   Ext Luis E Longus  5 5   Thumb Ext  5 5              +pronator drift RUE    Assessment:  58 yo female w/ history of NSCLC and multiple brain mets who is s/p Gamma Knife radiation in May who presents with memory issues and fatigue.  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 Radha Cain and Dr. 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 Cyn Laughlin)   Q 4 hour neuro checks  Cerebral edema:  -IV Decadron given, contine 4mg q 6 hours, PPI/SSI  -Keppra 1000mg BID for seizure prophylaxis  -HOB elevated  -keep Na WNL  -no dextrose in IVFs  Rad onc/oncology consulted to follow   Will follow peripherally while in house, please call with questions. DISPO- Dispo timing to be determined by primary team once patient is medically stable for discharge. JACQUIE Nogueira-CNP  Neurosurgery Nurse Practitioner  [phone]    Patient was discussed and images were reviewed with Dr. Radha Cain who agrees with above assessment and plan. Electronically signed by: JACQUIE Kuo - NP, 1/20/2023 12:50 PM    I spent 35 minutes in the care of this patient.   Over 50% of that time was in face-to-face counseling regarding disease process, diagnostic testing, preventative measures, and answering patient and family questions

## 2023-01-20 NOTE — PROGRESS NOTES
Physician Progress Note      PATIENTTulia Officer  CSN #:                  [id number]  :                       1965  ADMIT DATE:       2023 10:40 AM  [address] DATE:  RESPONDING  PROVIDER #:        Herminio Willis          QUERY TEXT:    Pt admitted with cerebral edema. Pt noted to have midline shift. If   clinically significant, please document in progress notes and discharge   summary if you are evaluating/treating any of the following: The medical record reflects the following:  Risk Factors: 60 yo w/ lung ca, brain mets, confusion, memory issues  Clinical Indicators: Per MRI : mass effect and 6 mm of left-to-right   midline shift  Treatment: MRI, Neurosurgery consult, IV Decadron  Options provided:  -- Brain compression  -- No brain compression  -- Other - I will add my own diagnosis  -- Disagree - Not applicable / Not valid  -- Disagree - Clinically unable to determine / Unknown  -- Refer to Clinical Documentation Reviewer    PROVIDER RESPONSE TEXT:    This patient has brain compression.     Query created by: Wilian Wolff on 2023 8:04 AM      Electronically signed by:  Herminio Willis 2023 9:08 AM

## 2023-01-20 NOTE — PLAN OF CARE
Problem: Neurosensory - Adult  Goal: Absence of seizures  Outcome: Progressing     Problem: Discharge Planning  Goal: Discharge to home or other facility with appropriate resources  Outcome: Progressing     Problem: Safety - Adult  Goal: Free from fall injury  Outcome: Progressing

## 2023-01-20 NOTE — PLAN OF CARE
Problem: Safety - Adult  Goal: Free from fall injury  1/20/2023 0027 by Josef Portillo RN  Outcome: Progressing  Note: All fall precautions in place and call light is within reach. Problem: Pain  Goal: Verbalizes/displays adequate comfort level or baseline comfort level  1/20/2023 0027 by Josef Portillo RN  Outcome: Progressing  Note: Pt. Managing pain per MAR.

## 2023-01-20 NOTE — CONSULTS
[facility]    Consult Note      Requesting Physician: JACQUIE Dobbins NP    CHIEF COMPLAINT:  Altered mental status        HISTORY OF PRESENT ILLNESS:      Ms. Clemente Thomas  is a 59 y.o. female we are seeing in consultation for Diagnosis of altered mental status and brain metastasis. Patient is well-known to our department and has been treated with gamma knife radiation/5 fraction icon to the left frontal lobe brain metastasis in May 2022. She initially had an excellent response to radiation therapy with a decrease of size of the lesion and surrounding vasogenic edema. Patient's  states that she has been somewhat off over the last month, with increased forgetfulness, word finding issues, and impaired gait and somnolence. She presented to the emergency room on 1/19/2023 for further evaluation. CT of the head was performed which revealed left frontal lobe lesion with increased vasogenic edema and 5 mm left to right midline shift. MRI of the brain was then performed which revealed the left frontal lobe lesion had increased in size to 3.1 cm with significant increase in vasogenic edema. She was started on Keppra 500 mg twice a day and Decadron 8 mg twice a day. She is now on a Decadron taper. Mental status is now improved. She is alert and oriented however has no recollection of previous confusion. She also has subtle residual right upper and lower extremity weakness.       Past Medical History:    Past Medical History:   Diagnosis Date    Balance disorder     Vestibular neuritis (?)    Brain cancer ([facility] [address]     Dizziness     Vestibular Neuritis (?)    Hyperlipidemia     Hypertension     ICH (intracerebral hemorrhage) 05/10/2022    Liver cancer ([facility] [address]     Malignant neoplasm of upper lobe of left lung (HCC)     Vitamin D deficiency      Past Surgical History:    Past Surgical History:   Procedure Laterality Date    ANKLE SURGERY Left     BRONCHOSCOPY N/A 5/12/2022    BRONCHOSCOPY BIOPSY BRONCHUS performed by Nyasia Santos MD at [address] [place] [address] Left 5/23/2022    PORT A CATHETER PLACEMENT performed by Migel Kimbrough MD at [facility]       Current Medications:    Current Facility-Administered Medications   Medication Dose Route Frequency Provider Last Rate Last Admin    folic acid (FOLVITE) tablet 1 mg  1 mg Oral Daily JACQUIE Dave - CNP   1 mg at 01/20/23 0924    atenolol (TENORMIN) tablet 100 mg  100 mg Oral Daily Gloria Charles MD   100 mg at 01/20/23 0803    atorvastatin (LIPITOR) tablet 40 mg  40 mg Oral Daily Gloria Charles MD   40 mg at 01/20/23 0803    levETIRAcetam (KEPPRA) 1,000 mg in sodium chloride 0.9 % 100 mL IVPB  1,000 mg IntraVENous Q12H Gloria Charles MD   Stopped at 01/20/23 1707    dexamethasone (DECADRON) injection 4 mg  4 mg IntraVENous Q6H Gloria Charles MD   4 mg at 01/20/23 1357    insulin lispro (1 Unit Dial) (HUMALOG/ADMELOG) pen 0-8 Units  0-8 Units SubCUTAneous TID WC Gloria Charles MD        insulin lispro (1 Unit Dial) (HUMALOG/ADMELOG) pen 0-4 Units  0-4 Units SubCUTAneous Nightly Gloria Charles MD        pantoprazole (PROTONIX) tablet 40 mg  40 mg Oral QAM AC Gloria Charles MD   40 mg at 01/20/23 0654    sodium chloride flush 0.9 % injection 5-40 mL  5-40 mL IntraVENous 2 times per day Gloria Charles MD   10 mL at 01/20/23 0803    sodium chloride flush 0.9 % injection 5-40 mL  5-40 mL IntraVENous PRN Gloria Charles MD        0.9 % sodium chloride infusion   IntraVENous PRN Gloria Charles MD        ondansetron (ZOFRAN-ODT) disintegrating tablet 4 mg  4 mg Oral Q8H PRN Gloria Charles MD        Or    ondansetron (ZOFRAN) injection 4 mg  4 mg IntraVENous Q6H PRN Gloria Charles MD        polyethylene glycol (GLYCOLAX) packet 17 g  17 g Oral Daily PRN Gloria Charles MD        acetaminophen (TYLENOL) tablet 650 mg 650 mg Oral Q6H PRN Maddison Clayton MD        Or    acetaminophen (TYLENOL) suppository 650 mg  650 mg Rectal Q6H PRN Maddison Clayton MD        0.9 % sodium chloride infusion   IntraVENous Continuous Maddison Clayton MD   Stopped at 01/20/23 7751     Allergies:  No Known Allergies    Social History:   Social History     Socioeconomic History    Marital status:      Spouse name: Paul Gomes    Number of children: Not on file    Years of education: Not on file    Highest education level: Not on file   Occupational History    Not on file   Tobacco Use    Smoking status: Every Day     Packs/day: 1.00     Years: 30.00     Pack years: 30.00     Types: Cigarettes    Smokeless tobacco: Never    Tobacco comments:     pt states she has chantix at home hasnt started yet   Vaping Use    Vaping Use: Not on file   Substance and Sexual Activity    Alcohol use: Yes     Alcohol/week: 2.0 - 4.0 standard drinks     Types: 2 - 4 Shots of liquor per week     Comment: vodka daily    Drug use: No    Sexual activity: Yes     Partners: Male   Other Topics Concern    Not on file   Social History Narrative    Not on file     Social Determinants of Health     Financial Resource Strain: Not on file   Food Insecurity: Not on file   Transportation Needs: Not on file   Physical Activity: Not on file   Stress: Not on file   Social Connections: Not on file   Intimate Partner Violence: Not on file   Housing Stability: Not on file          Family History:     Family History   Problem Relation Age of Onset    Stroke Mother     Cancer Father      REVIEW OF SYSTEMS:      Constitutional:  No weight loss, No fever, No chills, No night sweats. Mild fatigue.   Eyes:  No impairment or change in vision  ENT / Mouth:  No pain, abnormal ulceration, bleeding, nasal drip or change in voice or hearing  Cardiovascular:  No chest pain, palpitations, new edema, or calf discomfort  Respiratory:  No pain, hemoptysis, change to breathing  Breast:  No pain, discharge, change in appearance or texture  Gastrointestinal:  No pain, cramping, jaundice, change to eating and bowel habits  Urinary:  No pain, bleeding or change in continence  Genitalia: No pain, bleeding or discharge  Musculoskeletal:  Residual right-sided weakness. Previous altered mental status resolved. Skin:  No pruritus, rash, change to nodules or lesions  Neurologic:  No discomfort, change in mental status, speech, sensory or motor activity  Psychiatric:  No change in concentration or change to affect or mood  Endocrine:  No hot flashes, increased thirst, or change to urine production  Hematologic: No petechiae, ecchymosis or bleeding  Lymphatic:  No lymphadenopathy or lymphedema  Allergy / Immunologic:  No eczema, hives, frequent or recurrent infections    PHYSICAL EXAM:      Vitals:    Patient Vitals for the past 24 hrs:   BP Temp Temp src Pulse Resp SpO2 Height Weight   01/20/23 1559 -- -- -- -- -- -- 5' 6\" (1.676 m) --   01/20/23 1448 119/73 97.5 °F (36.4 °C) Oral 55 16 96 % -- --   01/20/23 1059 136/81 98.2 °F (36.8 °C) Oral 62 16 97 % -- --   01/20/23 0803 116/71 -- -- 67 -- -- -- --   01/20/23 0727 123/77 97.6 °F (36.4 °C) Oral 63 16 97 % -- --   01/20/23 0600 -- -- -- -- -- -- -- 137 lb 9 oz (62.4 kg)   01/20/23 0252 128/78 98.4 °F (36.9 °C) Oral 70 16 94 % -- --   01/19/23 2221 108/64 97.7 °F (36.5 °C) Oral 68 16 100 % -- --   01/19/23 1915 118/82 97.7 °F (36.5 °C) Oral 82 16 94 % -- --   01/19/23 1745 116/75 97.5 °F (36.4 °C) Oral 77 16 97 % -- --       Date 01/20/23 0000 - 01/20/23 2359   Shift 7971-0997 8318-5913 0675-8321 24 Hour Total   INTAKE   P.O.  240  240   Shift Total(mL/kg)  240(3.8)  240(3.8)   OUTPUT   Urine(mL/kg/hr)  300(0.6)  300   Shift Total(mL/kg)  300(4.8)  300(4.8)   Weight (kg) 62.4 62.4 62.4 62.4       CONSTITUTIONAL: Awake, alert, cooperative, no apparent distress.   EYES: PERRLA, sclera clear, conjunctiva normal, no obvious abnormality EOM's intact, peripheral vision intact. ENT: Normocephalic, atraumatic. NECK: Supple, symmetrical, no palpable masses. HEMATOLOGIC/LYMPHATIC: No lymphadenopathy. LUNGS: No increased work of breathing, clear to auscultation. CARDIOVASCULAR: Normal heart sounds, without murmur, regular rate and rhythm. ABDOMEN: Soft, non-distended, non-tender, normal bowel sounds, without masses or hepatosplenomegaly. MUSCULOSKELETAL: Normal strength, range of motion. Muscle strength 4.5/5 right upper and lower extremities. Slight right-sided limp. Patient states she feels right lower extremity is weaker. NEUROLOGIC: Alert, oriented, gross motor skills intact. Cranial nerves II-XII grossly intact. Rhombergs negative. Fine motor control intact with coaching. SKIN: Normal color, texture, and turgor, without jaundice, bruising or bleeding. EXTREMITIES: Without cyanosis, clubbing, edema or asymmetry. DATA:    CBC:    Lab Results   Component Value Date/Time    WBC 4.8 01/19/2023 11:43 AM    RBC 4.38 01/19/2023 11:43 AM    HGB 14.2 01/19/2023 11:43 AM    HCT 43.6 01/19/2023 11:43 AM    MCV 99.4 01/19/2023 11:43 AM    RDW 13.2 01/19/2023 11:43 AM     01/19/2023 11:43 AM     DIFF:  Lab Results   Component Value Date/Time    MCV 99.4 01/19/2023 11:43 AM    RDW 13.2 01/19/2023 11:43 AM        PT/INR:    Recent Labs     01/19/23  1143   PROT 8.2     PTT:  No results for input(s): APTT in the last 72 hours. CMP:    Lab Results   Component Value Date/Time     01/20/2023 06:09 AM    K 4.4 01/20/2023 06:09 AM    K 4.1 01/19/2023 11:43 AM     01/20/2023 06:09 AM    CO2 24 01/20/2023 06:09 AM    BUN 13 01/20/2023 06:09 AM    PROT 8.2 01/19/2023 11:43 AM    PROT 8.6 10/29/2010 11:08 AM     :    Lab Results   Component Value Date/Time    MG 1.90 05/12/2022 04:51 AM     Mg:  No results for input(s): MG in the last 720 hours.   Phosphorus:  No components found for: PO4  Calcium:  No results found for: CA  [email or web address]  Uric Acid: @labcrnt(URIC)@    Radiology Review:      EXAM: CT HEAD WO CONTRAST   1/19/2023       INDICATION: known brain mets, confusion       TECHNIQUE: Axial thin section CT images of the head were obtained without contrast. Sagittal and coronal 2-D multiplanar reconstructions were performed at the scanner. Up-to-date CT equipment and radiation dose reduction techniques were employed. COMPARISON: MRI head dated 10/5/2022. FINDINGS:       The diagnostic quality of the examination is adequate. Brain parenchyma: There is prominent low attenuation in the left frontal lobe considered to moderate vasogenic edema. This represents significant and vasogenic edema compared to MRI from 10/5/2022 and concerning for worsening intracranial metastasis. There is mass effect with sulcal effacement and partial effacement of the left frontal horn. There is slight left-to-right midline shift measuring 5 mm at the septum pellucidum on series 601 image 23. No evidence of intracranial hemorrhage. There is some peripheral hyperattenuation in the anterior left frontal lobe on series 601 image 33 measuring 2 cm and corresponding with complex hemorrhagic lesion seen on prior MRI. Ventricles and extraaxial spaces: Partial effacement of the left frontal horn of the left lateral ventricle is new compared to prior MRI. No extra-axial fluid collection. Orbits, paranasal sinuses, mastoids: No acute orbital abnormality. Clear paranasal sinuses. Clear mastoid air cells. Extracranial soft tissues: Normal.       Calvarium and skull base: No acute calvarial abnormality. Other: Atherosclerotic vascular calcifications. Impression       1. Prominent low-attenuation in the left frontal lobe representing significant increase in vasogenic edema suggesting worsening intracranial metastasis compared with MRI from 10/5/2022. There is associated new mass effect with 5 mm of left-to-right    midline shift.    2. No evidence of intracranial hemorrhage. EXAM: MRI BRAIN W WO CONTRAST  1/19/2023       INDICATION: Brain metastasis. COMPARISON: CT performed same day. MRI 10/5/22       TECHNIQUE: Multiplanar, multisequence MR imaging of the head obtained. IV contrast: None. FINDINGS:       MIDLINE STRUCTURES: The sella turcica and pituitary gland are normal. Craniovertebral alignment and cerebellar tonsils are normal.       VENTRICLES: Partial effacement left frontal horn with worsened edema. Left-to-right midline shift measures 6 mm. INTRACRANIAL HEMORRHAGE: Similar appearance of gradient susceptibility and intrinsic T1 hyperintensity within the left frontal lesion representing intralesional hemorrhage. The enhancement around the lesion has increased. Stable appearance of small    hemorrhagic lesion within the inferior aspect of the anterior left temporal lobe       BRAIN PARENCHYMA: Enhancement associated with the left frontal lobe lesion measuring about 3.1 x 1.9 cm (series 14 image 143) on axial images and 2.9 cm on coronal images (series 16 image 78), compared to potentially minimally enhancing area about 0.6 cm    on prior study. Severe regional T2 FLAIR hyperintense vasogenic edema throughout the left frontal lobe. Effacement of sulci. 5 mm enhancing lesion in the left cerebellar vermis (series 14 image 63), compared to 2 mm. No significant edema in this region. Patchy periventricular and deep white matter hyperintensity suggestive of chronic small vessel ischemia again seen. INTRACRANIAL VASCULATURE: Major flow voids maintained. ORBITS: Normal.       BONE: No significant abnormality in the calvarium or skull base. PARANASAL SINUSES/MASTOID BONES: No significant flow signal.       EXTRACRANIAL SOFT TISSUES: Normal.           Impression       1.   Progressive/recurrent enhancing tumor in the left frontal lobe metastatic lesion with extensive regional vasogenic edema resulting in localized mass effect and 6 mm of left-to-right midline shift. No significant change in the appearance of the    pre-existing intralesional hemorrhage. 2.  Slightly larger 5 mm enhancing lesion within the left cerebellar vermis consistent with metastasis. 3.  No change in the residual small hemorrhagic lesion in the left temporal lobe without associated enhancement or edema. CT CHEST ABDOMEN PELVIS W CONTRAST  1/19/2023       INDICATION: Lung cancer. COMPARISON: PET/CT 10/28/22. CT chest, abdomen and pelvis 8/19/22       TECHNIQUE:  CT of the chest, abdomen and pelvis was performed with intravenous contrast. Coronal and sagittal reformations were created. Up-to-date CT equipment and radiation dose reduction techniques were employed. 95 mL [id number]       FINDINGS:       CHEST:       Lungs and airways: 1.1 x 0.3 cm nodule in the apex of the left upper lobe (series 605 image 25), stable from prior PET. Two subpleural 3 to 4 mm nodules in the left upper lobe (images 12 and 21), also stable. No new or enlarging suspicious lung nodules. Scattered bilateral linear opacities likely subsegmental atelectasis or scarring. Mild dependent groundglass attenuation likely atelectasis. Pleura: No effusion or significant pleural thickening. Mediastinum: The heart is normal in size. Minimal pericardial effusion. Severe coronary artery disease. Lymph nodes: There is mild persistent lymphadenopathy in the left hilum, reference 1.5 x 0.6 cm. Mediastinal lymphadenopathy: Reference subcarinal node 3.3 x 1.7 cm (image 47), compared to 3.1 x 1.5 cm. Reference aortopulmonary window 1.6 x 1.2 cm (image    40), compared to 1.7 x 1.2 cm. Reference right lower paratracheal 1.7 x 1.2 cm (image 32), compared to 1.2 x 1.1 cm. Chest wall/lower neck: No significant findings. Bones: No destructive lesions.        ABDOMEN AND PELVIS:       Liver: Stable subcentimeter hypodensity in the anterior left lobe, likely benign/cyst.        Gallbladder and biliary tree: Gallbladder is nondistended. No intra or extrahepatic ductal dilatation. Pancreas: Normal.       Spleen: Normal.       Adrenals: Normal.       Kidneys: Symmetric enhancement. No hydronephrosis       Bladder: Normal.       Reproductive organs: No associated mass. Bowel: Normal caliber. No obstruction. Mild diffuse wall thickening in the colon may be from underdistention. Colitis not excluded. Normal appendix. Uncomplicated colonic diverticulosis. Lymph nodes: No lymphadenopathy. Peritoneum: No free air or free fluid. Vessels: Extensive atherosclerotic disease. Abdominal wall: Significant findings. Bones: Stable small osteolytic metastasis in the right iliac wing. Impression       CHEST       1. Stable residual nodule in the left upper lobe. 2.  Other small nonspecific left upper lobe nodules also stable. 3.  Persistent metastatic lymphadenopathy in the left hilum and mediastinum. Reference right paratracheal lymph node is slightly larger, remainder not changed significantly. ABDOMEN AND PELVIS       1.  Stable small osteolytic metastasis in the right iliac wing. 2.  Mild diffuse clonic wall thickening may be underdistention. Colitis is not excluded. CT of the head and MRI of the brain personally reviewed by myself and compared with previous imaging. MRI of the brain also personally reviewed by myself with the patient and her family. Problem List  Patient Active Problem List   Diagnosis    HTN (hypertension), benign    Vitamin D deficiency    Vertigo    Dyslipidemia    Intraparenchymal hemorrhage of brain ([address]    Lung mass    Mediastinal lymphadenopathy    Malignant neoplasm of upper lobe of left lung ([address]    Poor venous access    Cerebral edema ([address]       IMPRESSION/RECOMMENDATIONS:    Radha Turner is a 49-year-old female admitted with altered mental status. Personally reviewed MRI of the brain with Dr. Sandoval Olmos. Also Dr. Sandoval Olmos personally reviewed the MRI of the brain with Dr. Padmini Rhodes from neuroradiology. Current presentation most consistent with radiation necrosis. Will obtain MRI of the brain with spectroscopy and perfusion to confirm diagnosis. Will discuss at neuro-oncology tumor board. Patient will likely need Avastin infusions. Discussed the MRI spectroscopy and perfusion with the patient and her family to determine whether this is recurrent disease or radiation necrosis. 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 She has responded well to steroids and her presenting symptoms are significantly improved. Discussed that once the MRI spectroscopy perfusion is obtained, will discuss case at tumor board and likely start Avastin infusions. Side effects were discussed with patient and her family. Discussed if MRI reveals tumor progression, it is possible that we may be able to rear radiate the area. Ultimately if there are no other treatment options, this area could be surgically removed. This would only be as a last resort. Patient is okay for discharge. We will follow-up in outpatient once spectroscopy perfusion has been performed. Thank you for allowing us to participate in this patient's care. Please note this document has been produced using speech recognition software and may contain errors related to that system including errors in grammar, punctuation, and spelling, as well as words and phrases that may be inappropriate. If there are any questions or concerns please feel free to contact me for clarification. A total of 75 minutes was spent on today's patient encounter.   If applicable, non-patient-facing activities:     (  x )   Preparing to see the patient and reviewing records     ( x  )   Individual interpretation of results      (  x )   Discussion or coordination of care with other health care professionals     (  x )   Ordering of unique tests, medications, or procedures     (  x )   Documentation within the EHR       DARION Longoria    This visit was completed in conjunction with Dr. Kavin Guallpa.   Please Contact Through [employer]

## 2023-01-20 NOTE — PROGRESS NOTES
Pt. Oriented x4 with intermittent confusion and impulsivity. VSS on RA. Pt. Denies pain, headache, nausea, and dizziness. Pt. Voiding well via BRP up x1 gb/walker. All fall precautions in place and call light is within reach.

## 2023-01-21 LAB — VITAMIN D 1,25-DIHYDROXY: 60.2 PG/ML (ref 19.9–79.3)

## 2023-01-22 NOTE — DISCHARGE SUMMARY
Hospital Medicine Discharge Summary    Patient ID: Nacho Cain      Patient's PCP: No primary care provider on file. Admit Date: 1/19/2023     Discharge Date: 1/20/2023      Admitting Provider: Vianey Braden MD     Discharge Provider: Angella Shaw MD     Discharge Diagnoses: Active Hospital Problems    Diagnosis     Brain metastases [facility]) [C79.31]      Priority: Medium    Cerebral edema (HCC) [G93.6]      Priority: Medium       The patient was seen and examined on day of discharge and this discharge summary is in conjunction with any daily progress note from day of discharge. Hospital Course:  59 y.o. female with medical history most notable for non-small cell lung cancer with known brain metastasis and also a right iliac wing metastasis by PET scan currently on treatment who presented to the ED with intermittent confusions, short-term memory issues, not acting herself for the past week. Head CT in ER showed new cerebral edema in the left frontal lobe with midline shift. Admitted to the hospital for further monitoring. Neurosurgery, Oncology and Rad/Onc consulted and evaluated the patient. Started on Decadron and symptoms improved. 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 Plan for discharge home on Decadron taper with plan to follow-up with Heme/Onc next week to potentially start Avastin. Rad/Onc recommended obtaining MRI spectroscopy early as possible next week to aid in treatment planning. Physical Exam Performed:     /73   Pulse 55   Temp 97.5 °F (36.4 °C) (Oral)   Resp 16   Ht 5' 6\" (1.676 m)   Wt 137 lb 9 oz (62.4 kg)   SpO2 96%   BMI 22.20 kg/m²       General appearance:  No apparent distress, appears stated age and cooperative. HEENT:  Normal cephalic, atraumatic without obvious deformity. Pupils equal, round, and reactive to light. Extra ocular muscles intact. Conjunctivae/corneas clear.   Neck: Supple, with full range of motion. No jugular venous distention. Trachea midline. Respiratory:  Normal respiratory effort. Clear to auscultation, bilaterally without Rales/Wheezes/Rhonchi. Cardiovascular:  Regular rate and rhythm with normal S1/S2 without murmurs, rubs or gallops. Abdomen: Soft, non-tender, non-distended with normal bowel sounds. Musculoskeletal:  No clubbing, cyanosis or edema bilaterally. Full range of motion without deformity. Skin: Skin color, texture, turgor normal.  No rashes or lesions. Neurologic:  Neurovascularly intact without any focal sensory/motor deficits. Cranial nerves: II-XII intact, grossly non-focal.  Psychiatric:  Alert and oriented, thought content appropriate, normal insight  Capillary Refill: Brisk,< 3 seconds   Peripheral Pulses: +2 palpable, equal bilaterally       Labs: For convenience and continuity at follow-up the following most recent labs are provided:      CBC:    Lab Results   Component Value Date/Time    WBC 4.8 01/19/2023 11:43 AM    HGB 14.2 01/19/2023 11:43 AM    HCT 43.6 01/19/2023 11:43 AM     01/19/2023 11:43 AM       Renal:    Lab Results   Component Value Date/Time     01/20/2023 06:09 AM    K 4.4 01/20/2023 06:09 AM    K 4.1 01/19/2023 11:43 AM     01/20/2023 06:09 AM    CO2 24 01/20/2023 06:09 AM    BUN 13 01/20/2023 06:09 AM    CREATININE 0.8 01/20/2023 06:09 AM    CALCIUM 10.4 01/20/2023 06:09 AM    PHOS 3.0 01/20/2023 06:09 AM         Significant Diagnostic Studies    Radiology:   CT CHEST ABDOMEN PELVIS W CONTRAST Additional Contrast? Oral   Final Result      CHEST      1. Stable residual nodule in the left upper lobe. 2.  Other small nonspecific left upper lobe nodules also stable. 3.  Persistent metastatic lymphadenopathy in the left hilum and mediastinum. Reference right paratracheal lymph node is slightly larger, remainder not changed significantly.       ABDOMEN AND PELVIS      1.  Stable small osteolytic metastasis in the right iliac wing.   2.  Mild diffuse clonic wall thickening may be underdistention. Colitis is not excluded.      MRI BRAIN W WO CONTRAST   Final Result   Addendum (preliminary) 1 of 1   ADDENDUM #1    Addendum to technique: Study was performed with and without 14 mL ProHance    IV contrast.         Final      1.  Progressive/recurrent enhancing tumor in the left frontal lobe metastatic lesion with extensive regional vasogenic edema resulting in localized mass effect and 6 mm of left-to-right midline shift. No significant change in the appearance of the    pre-existing intralesional hemorrhage.   2.  Slightly larger 5 mm enhancing lesion within the left cerebellar vermis consistent with metastasis.   3.  No change in the residual small hemorrhagic lesion in the left temporal lobe without associated enhancement or edema.                XR CHEST (2 VW)   Final Result      No acute cardiopulmonary findings.      CT Head W/O Contrast   Final Result      1.  Prominent low-attenuation in the left frontal lobe representing significant increase in vasogenic edema suggesting worsening intracranial metastasis compared with MRI from 10/5/2022. There is associated new mass effect with 5 mm of left-to-right    midline shift.   2.  No evidence of intracranial hemorrhage.         MRI SPECTROSCOPY    (Results Pending)          Consults:     IP CONSULT TO NEUROSURGERY  IP CONSULT TO ONCOLOGY  IP CONSULT TO HOSPITALIST  IP CONSULT TO ONCOLOGY  IP CONSULT TO RADIATION ONCOLOGY  IP CONSULT TO RADIATION ONCOLOGY    Disposition:  Home     Condition at Discharge: Stable    Discharge Instructions/Follow-up:  Needs MRI spectroscopy per Rad/Onc recs on 01/23. Heme/Onc f/u next week    Code Status:  Prior     Activity: activity as tolerated    Diet: regular diet      Discharge Medications:     Discharge Medication List as of 1/20/2023  5:13 PM             Details   folic acid (FOLVITE) 1 MG tablet Take 1 tablet by mouth daily, Disp-30 tablet,  R-0Normal      dexamethasone (DECADRON) 4 MG tablet Take 2 tablets by mouth 2 times daily (with meals) for 4 days, THEN 1.5 tablets 2 times daily (with meals) for 4 days, THEN 1 tablet 2 times daily (with meals) for 4 days, THEN 0.5 tablets 2 times daily (with meals) for 4 days. , Disp-40 tablet, R-0Norma l                Details   levETIRAcetam (KEPPRA) 500 MG tablet Take 2 tablets by mouth 2 times daily, Disp-60 tablet, R-1Normal      pantoprazole (PROTONIX) 40 MG tablet Take 1 tablet by mouth every morning (before breakfast), Disp-30 tablet, R-0Normal                Details   ondansetron (ZOFRAN-ODT) 4 MG disintegrating tablet Take 1 tablet by mouth every 8 hours as needed for Nausea or Vomiting, Disp-30 tablet, R-0Print      atenolol (TENORMIN) 100 MG tablet TK 1 T PO DHistorical Med      atorvastatin (LIPITOR) 40 MG tablet TAKE 1 TABLET EVERY DAY, Disp-90 tablet, R-0Normal             Time Spent on discharge: 34 minutes in the examination, evaluation, counseling and review of medications and discharge plan. Signed:    Mohit Saha MD   1/21/2023      Thank you No primary care provider on file. for the opportunity to be involved in this patient's care. If you have any questions or concerns, please feel free to contact me at [phone].

## 2023-01-27 ENCOUNTER — HOSPITAL ENCOUNTER (OUTPATIENT)
Dept: MRI IMAGING | Age: 58
Discharge: HOME | End: 2023-01-27
Payer: COMMERCIAL

## 2023-01-27 DIAGNOSIS — C79.31 BRAIN METASTASES (HCC): ICD-10-CM

## 2023-01-27 LAB — PTH RELATED PEPTIDE: 2.1 PMOL/L (ref 0–3.4)

## 2023-01-27 PROCEDURE — A9576 INJ PROHANCE MULTIPACK: HCPCS | Performed by: NURSE PRACTITIONER

## 2023-01-27 PROCEDURE — 6360000004 HC RX CONTRAST MEDICATION: Performed by: NURSE PRACTITIONER

## 2023-01-27 PROCEDURE — 70553 MRI BRAIN STEM W/O & W/DYE: CPT

## 2023-01-27 RX ADMIN — GADOTERIDOL 20 ML: 279.3 INJECTION, SOLUTION INTRAVENOUS at 14:36

## 2023-03-08 ENCOUNTER — HOSPITAL ENCOUNTER (OUTPATIENT)
Dept: MRI IMAGING | Age: 58
Discharge: HOME | End: 2023-03-08
Payer: COMMERCIAL

## 2023-03-08 DIAGNOSIS — C79.31 BRAIN METASTASES (HCC): ICD-10-CM

## 2023-03-08 PROCEDURE — A9576 INJ PROHANCE MULTIPACK: HCPCS | Performed by: NEUROLOGICAL SURGERY

## 2023-03-08 PROCEDURE — 6360000004 HC RX CONTRAST MEDICATION: Performed by: NEUROLOGICAL SURGERY

## 2023-03-08 PROCEDURE — 70552 MRI BRAIN STEM W/DYE: CPT

## 2023-03-08 RX ORDER — DEXAMETHASONE 4 MG/1
4 TABLET ORAL
Status: ON HOLD | COMMUNITY
Start: 2022-06-13 | End: 2023-03-15 | Stop reason: HOSPADM

## 2023-03-08 RX ADMIN — GADOTERIDOL 15 ML: 279.3 INJECTION, SOLUTION INTRAVENOUS at 11:22

## 2023-03-08 NOTE — PROGRESS NOTES
[facility] PRE-SURGICAL TESTING INSTRUCTIONS                      PRIOR TO PROCEDURE DATE:    1. PLEASE FOLLOW ANY INSTRUCTIONS GIVEN TO YOU PER YOUR SURGEON. 2. Arrange for someone to drive you home and be with you for the first 24 hours after discharge for your safety after your procedure for which you received sedation. Ensure it is someone we can share information with regarding your discharge. NOTE: At this time ONLY 2 ADULTS may accompany you   One person ENCOURAGED to stay at hospital entire time if outpatient surgery      3. You must contact your surgeon for instructions IF:  You are taking any blood thinners, aspirin, anti-inflammatory or vitamins. There is a change in your physical condition such as a cold, fever, rash, cuts, sores, or any other infection, especially near your surgical site. 4. Do not drink alcohol the day before or day of your procedure. Do not use any recreational marijuana at least 24 hours or street drugs (heroin, cocaine) at minimum 5 days prior to your procedure. 5. A Pre-Surgical History and Physical MUST be completed WITHIN 30 DAYS OR LESS prior to your procedure. by your Physician or an Urgent Care        THE DAY OF YOUR PROCEDURE:  1. Follow instructions for ARRIVAL TIME as DIRECTED BY YOUR SURGEON. 2. Enter the MAIN entrance from [employer] and follow the signs to the free [employer]ing [employer] or [employer] (offered free of charge 7 am-5pm). 3. Enter the Main Entrance of the hospital (do not enter from the lower level of the parking garage). Upon entrance, check in with the  at the surgical information desk on your LEFT. Bring your insurance card and photo ID to register      4. DO NOT EAT ANYTHING 8 hours prior to arrival for surgery. You may have up to 8 ounces of water 4 hours prior to your arrival for surgery.    NOTE: ALL Gastric, Bariatric & Bowel surgery patients - you MUST follow your surgeon's instructions regarding eating/ drinking as you will have very specific instructions to follow. If you did not receive these, call your surgeon's office immediately. 5. MEDICATIONS:  Take the following medications with a SMALL sip of water: ATENOLOL, KEPPRA, PROTONIX DEXAMETHASONE  Use your usual dose of inhalers the morning of surgery. BRING your rescue inhaler with you to hospital.   Anesthesia does NOT want you to take insulin the morning of surgery. They will control your blood sugar while you are at the hospital. Please contact your ordering physician for instructions regarding your insulin the night before your procedure. If you have an insulin pump, please keep it set on basal rate. Bariatric patient's call your surgeon if on diabetic medications as some may need to be stopped 1 week prior to surgery    6. Do not swallow additional water when brushing teeth. No gum, candy, mints, or ice chips. Refrain from smoking or at least decrease the amount on day of surgery. 7. Morning of surgery:   Take a shower with an antibacterial soap (i.e., Safeguard or Dial) OR your physician may have instructed you to use Hibiclens. Dress in loose, comfortable clothing appropriate for redressing after your procedure. Do not wear jewelry (including body piercings), make-up (especially NO eye make-up), fingernail polish (NO toenail polish if foot/leg surgery), lotion, powders, or metal hairclips. Do not shave or wax for 72 hours prior to procedure near your operative site. Shaving with a razor can irritate your skin and make it easier to develop an infection. On the day of your procedure, any hair that needs to be removed near the surgical site will be 'clipped' by a healthcare worker using a special clipper designed to avoid skin irritation. 8. Dentures, glasses, or contacts will need to be removed before your procedure. Bring cases for your glasses, contacts, dentures, or hearing aids to protect them while you are in surgery.       9. If you use a CPAP, please bring it with you on the day of your procedure. 10. We recommend that valuable personal belongings such as cash, cell phones, e-tablets, or jewelry, be left at home during your stay. The hospital will not be responsible for valuables that are not secured in the hospital safe. However, if your insurance requires a co-pay, you may want to bring a method of payment, i.e., Check or credit card, if you wish to pay your co-pay the day of surgery. 11. If you are to stay overnight, you may bring a bag with personal items. Please have any large items you may need brought in by your family after your arrival to your hospital room. 12. If you have a Living Will or Durable Power of , please bring a copy on the day of your procedure. How we keep you safe and work to prevent surgical site infections:   1. Health care workers should always check your ID bracelet to verify your name and birth date. You will be asked many times to state your name, date of birth, and allergies. 2. Health care workers should always clean their hands with soap or alcohol gel before providing care to you. It is okay to ask anyone if they cleaned their hands before they touch you. 3. You will be actively involved in verifying the type of procedure you are having and ensuring the correct surgical site. This will be confirmed multiple times prior to your procedure. Do NOT willy your surgery site UNLESS instructed to by your surgeon. 4. When you are in the operating room, your surgical site will be cleansed with a special soap, and in most cases, you will be given an antibiotic before the surgery begins. What to expect AFTER your procedure? 1. Immediately following your procedure, your will be taken to the PACU for the first phase of your recovery.   Your nurse will help you recover from any potential side effects of anesthesia, such as extreme drowsiness, changes in your vital signs or breathing patterns. Nausea, headache, muscle aches, or sore throat may also occur after anesthesia. Your nurse will help you manage these potential side effects. 2. For comfort and safety, arrange to have someone at home with you for the first 24 hours after discharge. 3. You and your family will be given written instructions about your diet, activity, dressing care, medications, and return visits. 4. Once at home, should issues with nausea, pain, or bleeding occur, or should you notice any signs of infection, you should call your surgeon. 5. Always clean your hands before and after caring for your wound. Do not let your family touch your surgery site without cleaning their hands. 6. Narcotic pain medications can cause significant constipation. You may want to add a stool softener to your postoperative medication schedule or speak to your surgeon on how best to manage this SIDE EFFECT. SPECIAL INSTRUCTIONS     Thank you for allowing us to care for you. We strive to exceed your expectations in the delivery of care and service provided to you and your family. If you need to contact the [place] [id number] staff for any reason, please call us at [phone]    Instructions reviewed with patient during preadmission testing phone interview.   Genoveva Hennessy RN.3/8/2023 .1:01 PM      ADDITIONAL EDUCATIONAL INFORMATION REVIEWED PER PHONE WITH YOU AND/OR YOUR FAMILY:  Yes Hibiclens® Bathing Instructions   Yes Antibacterial Soap

## 2023-03-08 NOTE — PROGRESS NOTES
Place patient label inside box (if no patient label, complete below)  Name:  :  MR#:     Dung Diaz / PROCEDURE  I (we)Kinza authorize DR LANE Tellez and/or such assistants as may be selected by him/her, to perform the following operation/procedure(s): LEFT FRONTAL CRANIOTOMY FOR RESECTION OF BRAIN MASS        Note: If unable to obtain consent prior to an emergent procedure, document the emergent reason in the medical record. This procedure has been explained to my (our) satisfaction and included in the explanation was: The intended benefit, nature, and extent of the procedure to be performed; The significant risks involved and the probability of success; Alternative procedures and methods of treatment; The dangers and probable consequences of such alternatives (including no procedure or treatment); The expected consequences of the procedure on my future health; Whether other qualified individuals would be performing important surgical tasks and/or whether  would be present to advise or support the procedure. I (we) understand that there are other risks of infection and other serious complications in the pre-operative/procedural and postoperative/procedural stages of my (our) care. I (we) have asked all of the questions which I (we) thought were important in deciding whether or not to undergo treatment or diagnosis. These questions have been answered to my (our) satisfaction. I (we) understand that no assurance can be given that the procedure will be a success, and no guarantee or warranty of success has been given to me (us). It has been explained to me (us) that during the course of the operation/procedure, unforeseen conditions may be revealed that necessitate extension of the original procedure(s) or different procedure(s) than those set forth in Paragraph 1.  I (we) authorize and request that the above-named physician, his/her assistants or his/her designees, perform procedures as necessary and desirable if deemed to be in my (our) best interest.     Revised 8/2/2021                                                                          Page 1 of 2           I acknowledge that health care personnel may be observing this procedure for the purpose of medical education or other specified purposes as may be necessary as requested and/or approved by my (our) physician.    I (we) consent to the disposal by the hospital Pathologist of the removed tissue, parts or organs in accordance with hospital policy.    I do ____ do not ____ consent to the use of a local infiltration pain blocking agent that will be used by my provider/surgical provider to help alleviate pain during my procedure.    I do ____ do not ____ consent to an emergent blood transfusion in the case of a life-threatening situation that requires blood components to be administered. This consent is valid for 24 hours from the beginning of the procedure.     This patient does ____ or does not ____ currently have a DNR status/order. If DNR order is in place, obtain “Addendum to the Surgical Consent for ALL Patients with a DNR Order” to address dee-operative status for limited intervention or DNR suspension.     I have read and fully understand the above Consent for Operation/Procedure and that all blanks were completed before I signed the consent.   _____________________________       _____________________      ____/____am/pm  Signature of Patient or legal representative      Printed Name / Relationship            Date / Time   ____________________________       _____________________      ____/____am/pm  Witness to Signature                                    Printed Name                    Date / Time    If patient is unable to sign or is a minor, complete the following)  Patient is a minor, ____ years of age, or unable to sign because:  ______________________________________________________________________________________________    If a phone consent is obtained, consent will be documented by using two health care professionals, each affirming that the consenting party has no questions and gives consent for the procedure discussed with the physician/provider.   _____________________          ____________________       _____/_____am/pm   2nd witness to phone consent        Printed name           Date / Time    Informed Consent:  I have provided the explanation described above in section 1 to the patient and/or legal representative.  I have provided the patient and/or legal representative with an opportunity to ask any questions about the proposed operation/procedure.   ___________________________          ____________________         ____/____am/pm  Provider / Proceduralist                            Printed name            Date / Time  Revised 8/2/2021                                                                      Page 2 of 2

## 2023-03-10 ENCOUNTER — ANESTHESIA EVENT (OUTPATIENT)
Dept: OPERATING ROOM | Age: 58
DRG: 025 | End: 2023-03-10
Payer: COMMERCIAL

## 2023-03-13 ENCOUNTER — ANESTHESIA (OUTPATIENT)
Dept: OPERATING ROOM | Age: 58
DRG: 025 | End: 2023-03-13
Payer: COMMERCIAL

## 2023-03-13 ENCOUNTER — APPOINTMENT (OUTPATIENT)
Dept: CT IMAGING | Age: 58
DRG: 025 | End: 2023-03-13
Attending: NEUROLOGICAL SURGERY
Payer: COMMERCIAL

## 2023-03-13 ENCOUNTER — HOSPITAL ENCOUNTER (INPATIENT)
Age: 58
LOS: 2 days | Discharge: HOME | DRG: 025 | End: 2023-03-15
Attending: NEUROLOGICAL SURGERY | Admitting: NEUROLOGICAL SURGERY
Payer: COMMERCIAL

## 2023-03-13 DIAGNOSIS — C79.31 SECONDARY MALIGNANT NEOPLASM OF BRAIN (HCC): ICD-10-CM

## 2023-03-13 DIAGNOSIS — Z98.890 S/P CRANIOTOMY: Primary | ICD-10-CM

## 2023-03-13 PROBLEM — G93.9 BRAIN LESION: Status: ACTIVE | Noted: 2023-03-13

## 2023-03-13 LAB
ABO/RH: NORMAL
ANION GAP SERPL CALCULATED.3IONS-SCNC: 15 MMOL/L (ref 3–16)
ANTIBODY SCREEN: NORMAL
BUN BLDV-MCNC: 32 MG/DL (ref 7–20)
CALCIUM SERPL-MCNC: 10 MG/DL (ref 8.3–10.6)
CHLORIDE BLD-SCNC: 102 MMOL/L (ref 99–110)
CO2: 24 MMOL/L (ref 21–32)
CREAT SERPL-MCNC: 0.7 MG/DL (ref 0.6–1.1)
EKG ATRIAL RATE: 85 BPM
EKG DIAGNOSIS: NORMAL
EKG P AXIS: 69 DEGREES
EKG P-R INTERVAL: 158 MS
EKG Q-T INTERVAL: 378 MS
EKG QRS DURATION: 76 MS
EKG QTC CALCULATION (BAZETT): 449 MS
EKG R AXIS: 78 DEGREES
EKG T AXIS: 73 DEGREES
EKG VENTRICULAR RATE: 85 BPM
GFR SERPL CREATININE-BSD FRML MDRD: >60 ML/MIN/{1.73_M2}
GLUCOSE BLD-MCNC: 108 MG/DL (ref 70–99)
GLUCOSE BLD-MCNC: 111 MG/DL (ref 70–99)
GLUCOSE BLD-MCNC: 139 MG/DL (ref 70–99)
INR BLD: 0.91 (ref 0.87–1.14)
PERFORMED ON: ABNORMAL
PERFORMED ON: ABNORMAL
POTASSIUM SERPL-SCNC: 4.4 MMOL/L (ref 3.5–5.1)
PROTHROMBIN TIME: 12.1 SEC (ref 11.7–14.5)
SODIUM BLD-SCNC: 141 MMOL/L (ref 136–145)

## 2023-03-13 PROCEDURE — 70450 CT HEAD/BRAIN W/O DYE: CPT

## 2023-03-13 PROCEDURE — 86900 BLOOD TYPING SEROLOGIC ABO: CPT

## 2023-03-13 PROCEDURE — 6360000002 HC RX W HCPCS: Performed by: NEUROLOGICAL SURGERY

## 2023-03-13 PROCEDURE — 3700000001 HC ADD 15 MINUTES (ANESTHESIA): Performed by: NEUROLOGICAL SURGERY

## 2023-03-13 PROCEDURE — 2580000003 HC RX 258: Performed by: NURSE ANESTHETIST, CERTIFIED REGISTERED

## 2023-03-13 PROCEDURE — 6360000002 HC RX W HCPCS: Performed by: NURSE ANESTHETIST, CERTIFIED REGISTERED

## 2023-03-13 PROCEDURE — 2580000003 HC RX 258: Performed by: PHYSICIAN ASSISTANT

## 2023-03-13 PROCEDURE — 6360000002 HC RX W HCPCS: Performed by: PHYSICIAN ASSISTANT

## 2023-03-13 PROCEDURE — 2000000000 HC ICU R&B

## 2023-03-13 PROCEDURE — 7100000000 HC PACU RECOVERY - FIRST 15 MIN: Performed by: NEUROLOGICAL SURGERY

## 2023-03-13 PROCEDURE — 88307 TISSUE EXAM BY PATHOLOGIST: CPT

## 2023-03-13 PROCEDURE — 2580000003 HC RX 258: Performed by: FAMILY MEDICINE

## 2023-03-13 PROCEDURE — C1713 ANCHOR/SCREW BN/BN,TIS/BN: HCPCS | Performed by: NEUROLOGICAL SURGERY

## 2023-03-13 PROCEDURE — 2580000003 HC RX 258: Performed by: NEUROLOGICAL SURGERY

## 2023-03-13 PROCEDURE — 6370000000 HC RX 637 (ALT 250 FOR IP): Performed by: NEUROLOGICAL SURGERY

## 2023-03-13 PROCEDURE — 2709999900 HC NON-CHARGEABLE SUPPLY: Performed by: NEUROLOGICAL SURGERY

## 2023-03-13 PROCEDURE — 2500000003 HC RX 250 WO HCPCS: Performed by: NURSE ANESTHETIST, CERTIFIED REGISTERED

## 2023-03-13 PROCEDURE — 6370000000 HC RX 637 (ALT 250 FOR IP): Performed by: PHYSICIAN ASSISTANT

## 2023-03-13 PROCEDURE — 86901 BLOOD TYPING SEROLOGIC RH(D): CPT

## 2023-03-13 PROCEDURE — 86850 RBC ANTIBODY SCREEN: CPT

## 2023-03-13 PROCEDURE — 93005 ELECTROCARDIOGRAM TRACING: CPT | Performed by: NEUROLOGICAL SURGERY

## 2023-03-13 PROCEDURE — 88331 PATH CONSLTJ SURG 1 BLK 1SPC: CPT

## 2023-03-13 PROCEDURE — 2500000003 HC RX 250 WO HCPCS: Performed by: NEUROLOGICAL SURGERY

## 2023-03-13 PROCEDURE — 3700000000 HC ANESTHESIA ATTENDED CARE: Performed by: NEUROLOGICAL SURGERY

## 2023-03-13 PROCEDURE — 00B70ZZ EXCISION OF CEREBRAL HEMISPHERE, OPEN APPROACH: ICD-10-PCS | Performed by: NEUROLOGICAL SURGERY

## 2023-03-13 PROCEDURE — 80048 BASIC METABOLIC PNL TOTAL CA: CPT

## 2023-03-13 PROCEDURE — 99291 CRITICAL CARE FIRST HOUR: CPT

## 2023-03-13 PROCEDURE — 93010 ELECTROCARDIOGRAM REPORT: CPT | Performed by: INTERNAL MEDICINE

## 2023-03-13 PROCEDURE — 7100000001 HC PACU RECOVERY - ADDTL 15 MIN: Performed by: NEUROLOGICAL SURGERY

## 2023-03-13 PROCEDURE — 3600000014 HC SURGERY LEVEL 4 ADDTL 15MIN: Performed by: NEUROLOGICAL SURGERY

## 2023-03-13 PROCEDURE — A4217 STERILE WATER/SALINE, 500 ML: HCPCS | Performed by: NEUROLOGICAL SURGERY

## 2023-03-13 PROCEDURE — 3600000004 HC SURGERY LEVEL 4 BASE: Performed by: NEUROLOGICAL SURGERY

## 2023-03-13 PROCEDURE — 2500000003 HC RX 250 WO HCPCS

## 2023-03-13 PROCEDURE — 85610 PROTHROMBIN TIME: CPT

## 2023-03-13 PROCEDURE — 2720000010 HC SURG SUPPLY STERILE: Performed by: NEUROLOGICAL SURGERY

## 2023-03-13 DEVICE — STRAIGHT PLATE, 16MM BAR
Type: IMPLANTABLE DEVICE | Site: CRANIAL | Status: FUNCTIONAL
Brand: UNIVERSAL NEURO 3

## 2023-03-13 DEVICE — SCREW UN3 SLFTP 1.5X4MM: Type: IMPLANTABLE DEVICE | Site: CRANIAL | Status: FUNCTIONAL

## 2023-03-13 DEVICE — BURR HOLE COVER, WITH TAB, 10MM
Type: IMPLANTABLE DEVICE | Site: CRANIAL | Status: FUNCTIONAL
Brand: UNIVERSAL NEURO 3

## 2023-03-13 RX ORDER — SODIUM CHLORIDE 9 MG/ML
25 INJECTION, SOLUTION INTRAVENOUS PRN
Status: DISCONTINUED | OUTPATIENT
Start: 2023-03-13 | End: 2023-03-15 | Stop reason: HOSPADM

## 2023-03-13 RX ORDER — FENTANYL CITRATE 50 UG/ML
25 INJECTION, SOLUTION INTRAMUSCULAR; INTRAVENOUS EVERY 5 MIN PRN
Status: DISCONTINUED | OUTPATIENT
Start: 2023-03-13 | End: 2023-03-15 | Stop reason: HOSPADM

## 2023-03-13 RX ORDER — PHENYLEPHRINE HYDROCHLORIDE 10 MG/ML
INJECTION INTRAVENOUS PRN
Status: DISCONTINUED | OUTPATIENT
Start: 2023-03-13 | End: 2023-03-13 | Stop reason: SDUPTHER

## 2023-03-13 RX ORDER — FENTANYL CITRATE 50 UG/ML
INJECTION, SOLUTION INTRAMUSCULAR; INTRAVENOUS PRN
Status: DISCONTINUED | OUTPATIENT
Start: 2023-03-13 | End: 2023-03-13 | Stop reason: SDUPTHER

## 2023-03-13 RX ORDER — PROCHLORPERAZINE EDISYLATE 5 MG/ML
5 INJECTION INTRAMUSCULAR; INTRAVENOUS
Status: ACTIVE | OUTPATIENT
Start: 2023-03-13 | End: 2023-03-14

## 2023-03-13 RX ORDER — DEXAMETHASONE 4 MG/1
4 TABLET ORAL EVERY 6 HOURS
Status: DISCONTINUED | OUTPATIENT
Start: 2023-03-13 | End: 2023-03-15 | Stop reason: HOSPADM

## 2023-03-13 RX ORDER — SODIUM CHLORIDE 0.9 % (FLUSH) 0.9 %
5-40 SYRINGE (ML) INJECTION EVERY 12 HOURS SCHEDULED
Status: DISCONTINUED | OUTPATIENT
Start: 2023-03-13 | End: 2023-03-13 | Stop reason: HOSPADM

## 2023-03-13 RX ORDER — ONDANSETRON 4 MG/1
4 TABLET, ORALLY DISINTEGRATING ORAL EVERY 8 HOURS PRN
Status: DISCONTINUED | OUTPATIENT
Start: 2023-03-13 | End: 2023-03-15 | Stop reason: HOSPADM

## 2023-03-13 RX ORDER — LABETALOL HYDROCHLORIDE 5 MG/ML
10 INJECTION, SOLUTION INTRAVENOUS
Status: DISCONTINUED | OUTPATIENT
Start: 2023-03-13 | End: 2023-03-13

## 2023-03-13 RX ORDER — PANTOPRAZOLE SODIUM 40 MG/1
40 TABLET, DELAYED RELEASE ORAL
Status: DISCONTINUED | OUTPATIENT
Start: 2023-03-14 | End: 2023-03-15 | Stop reason: HOSPADM

## 2023-03-13 RX ORDER — ONDANSETRON 2 MG/ML
4 INJECTION INTRAMUSCULAR; INTRAVENOUS EVERY 6 HOURS PRN
Status: DISCONTINUED | OUTPATIENT
Start: 2023-03-13 | End: 2023-03-15 | Stop reason: HOSPADM

## 2023-03-13 RX ORDER — LEVETIRACETAM 500 MG/1
1000 TABLET ORAL 2 TIMES DAILY
Status: DISCONTINUED | OUTPATIENT
Start: 2023-03-13 | End: 2023-03-15 | Stop reason: HOSPADM

## 2023-03-13 RX ORDER — MEPERIDINE HYDROCHLORIDE 25 MG/ML
12.5 INJECTION INTRAMUSCULAR; INTRAVENOUS; SUBCUTANEOUS EVERY 5 MIN PRN
Status: DISCONTINUED | OUTPATIENT
Start: 2023-03-13 | End: 2023-03-13 | Stop reason: HOSPADM

## 2023-03-13 RX ORDER — SODIUM CHLORIDE 9 MG/ML
INJECTION, SOLUTION INTRAVENOUS CONTINUOUS
Status: DISCONTINUED | OUTPATIENT
Start: 2023-03-13 | End: 2023-03-14

## 2023-03-13 RX ORDER — LABETALOL HYDROCHLORIDE 5 MG/ML
10 INJECTION, SOLUTION INTRAVENOUS EVERY 4 HOURS PRN
Status: DISCONTINUED | OUTPATIENT
Start: 2023-03-13 | End: 2023-03-15 | Stop reason: HOSPADM

## 2023-03-13 RX ORDER — PROPOFOL 10 MG/ML
INJECTION, EMULSION INTRAVENOUS CONTINUOUS PRN
Status: DISCONTINUED | OUTPATIENT
Start: 2023-03-13 | End: 2023-03-13 | Stop reason: SDUPTHER

## 2023-03-13 RX ORDER — SODIUM CHLORIDE 9 MG/ML
INJECTION, SOLUTION INTRAVENOUS PRN
Status: DISCONTINUED | OUTPATIENT
Start: 2023-03-13 | End: 2023-03-15 | Stop reason: HOSPADM

## 2023-03-13 RX ORDER — ONDANSETRON 2 MG/ML
4 INJECTION INTRAMUSCULAR; INTRAVENOUS
Status: ACTIVE | OUTPATIENT
Start: 2023-03-13 | End: 2023-03-14

## 2023-03-13 RX ORDER — METHOCARBAMOL 750 MG/1
750 TABLET, FILM COATED ORAL EVERY 8 HOURS PRN
Status: DISCONTINUED | OUTPATIENT
Start: 2023-03-13 | End: 2023-03-15 | Stop reason: HOSPADM

## 2023-03-13 RX ORDER — DEXAMETHASONE SODIUM PHOSPHATE 4 MG/ML
INJECTION, SOLUTION INTRA-ARTICULAR; INTRALESIONAL; INTRAMUSCULAR; INTRAVENOUS; SOFT TISSUE PRN
Status: DISCONTINUED | OUTPATIENT
Start: 2023-03-13 | End: 2023-03-13 | Stop reason: SDUPTHER

## 2023-03-13 RX ORDER — BUPIVACAINE HYDROCHLORIDE AND EPINEPHRINE 5; 5 MG/ML; UG/ML
INJECTION, SOLUTION EPIDURAL; INTRACAUDAL; PERINEURAL PRN
Status: DISCONTINUED | OUTPATIENT
Start: 2023-03-13 | End: 2023-03-13 | Stop reason: HOSPADM

## 2023-03-13 RX ORDER — SUCCINYLCHOLINE/SOD CL,ISO/PF 200MG/10ML
SYRINGE (ML) INTRAVENOUS PRN
Status: DISCONTINUED | OUTPATIENT
Start: 2023-03-13 | End: 2023-03-13 | Stop reason: SDUPTHER

## 2023-03-13 RX ORDER — HEPARIN SODIUM 5000 [USP'U]/.5ML
5000 INJECTION, SOLUTION INTRAVENOUS; SUBCUTANEOUS EVERY 8 HOURS
Status: DISCONTINUED | OUTPATIENT
Start: 2023-03-14 | End: 2023-03-14

## 2023-03-13 RX ORDER — SODIUM CHLORIDE 0.9 % (FLUSH) 0.9 %
5-40 SYRINGE (ML) INJECTION PRN
Status: DISCONTINUED | OUTPATIENT
Start: 2023-03-13 | End: 2023-03-15 | Stop reason: HOSPADM

## 2023-03-13 RX ORDER — MIDAZOLAM HYDROCHLORIDE 1 MG/ML
INJECTION INTRAMUSCULAR; INTRAVENOUS PRN
Status: DISCONTINUED | OUTPATIENT
Start: 2023-03-13 | End: 2023-03-13 | Stop reason: SDUPTHER

## 2023-03-13 RX ORDER — ACETAMINOPHEN 325 MG/1
650 TABLET ORAL EVERY 6 HOURS
Status: DISCONTINUED | OUTPATIENT
Start: 2023-03-13 | End: 2023-03-15 | Stop reason: HOSPADM

## 2023-03-13 RX ORDER — LABETALOL HYDROCHLORIDE 5 MG/ML
10 INJECTION, SOLUTION INTRAVENOUS
Status: DISCONTINUED | OUTPATIENT
Start: 2023-03-13 | End: 2023-03-13 | Stop reason: HOSPADM

## 2023-03-13 RX ORDER — ATORVASTATIN CALCIUM 40 MG/1
40 TABLET, FILM COATED ORAL DAILY
Status: DISCONTINUED | OUTPATIENT
Start: 2023-03-14 | End: 2023-03-15 | Stop reason: HOSPADM

## 2023-03-13 RX ORDER — PROPOFOL 10 MG/ML
INJECTION, EMULSION INTRAVENOUS PRN
Status: DISCONTINUED | OUTPATIENT
Start: 2023-03-13 | End: 2023-03-13 | Stop reason: SDUPTHER

## 2023-03-13 RX ORDER — SODIUM CHLORIDE 0.9 % (FLUSH) 0.9 %
5-40 SYRINGE (ML) INJECTION EVERY 12 HOURS SCHEDULED
Status: DISCONTINUED | OUTPATIENT
Start: 2023-03-13 | End: 2023-03-15 | Stop reason: HOSPADM

## 2023-03-13 RX ORDER — IPRATROPIUM BROMIDE AND ALBUTEROL SULFATE 2.5; .5 MG/3ML; MG/3ML
1 SOLUTION RESPIRATORY (INHALATION)
Status: ACTIVE | OUTPATIENT
Start: 2023-03-13 | End: 2023-03-14

## 2023-03-13 RX ORDER — SODIUM CHLORIDE, SODIUM LACTATE, POTASSIUM CHLORIDE, CALCIUM CHLORIDE 600; 310; 30; 20 MG/100ML; MG/100ML; MG/100ML; MG/100ML
INJECTION, SOLUTION INTRAVENOUS CONTINUOUS
Status: DISCONTINUED | OUTPATIENT
Start: 2023-03-13 | End: 2023-03-13 | Stop reason: HOSPADM

## 2023-03-13 RX ORDER — SODIUM CHLORIDE 9 MG/ML
25 INJECTION, SOLUTION INTRAVENOUS PRN
Status: DISCONTINUED | OUTPATIENT
Start: 2023-03-13 | End: 2023-03-13 | Stop reason: HOSPADM

## 2023-03-13 RX ORDER — METOCLOPRAMIDE HYDROCHLORIDE 5 MG/ML
10 INJECTION INTRAMUSCULAR; INTRAVENOUS
Status: DISCONTINUED | OUTPATIENT
Start: 2023-03-13 | End: 2023-03-13 | Stop reason: HOSPADM

## 2023-03-13 RX ORDER — MANNITOL 20 G/100ML
INJECTION, SOLUTION INTRAVENOUS PRN
Status: DISCONTINUED | OUTPATIENT
Start: 2023-03-13 | End: 2023-03-13 | Stop reason: SDUPTHER

## 2023-03-13 RX ORDER — SODIUM CHLORIDE 0.9 % (FLUSH) 0.9 %
5-40 SYRINGE (ML) INJECTION PRN
Status: DISCONTINUED | OUTPATIENT
Start: 2023-03-13 | End: 2023-03-13 | Stop reason: HOSPADM

## 2023-03-13 RX ORDER — OXYCODONE HYDROCHLORIDE AND ACETAMINOPHEN 5; 325 MG/1; MG/1
1 TABLET ORAL EVERY 6 HOURS PRN
Status: ON HOLD | COMMUNITY
End: 2023-03-15 | Stop reason: HOSPADM

## 2023-03-13 RX ORDER — LIDOCAINE HYDROCHLORIDE 20 MG/ML
INJECTION, SOLUTION INTRAVENOUS PRN
Status: DISCONTINUED | OUTPATIENT
Start: 2023-03-13 | End: 2023-03-13 | Stop reason: SDUPTHER

## 2023-03-13 RX ORDER — SODIUM CHLORIDE, SODIUM LACTATE, POTASSIUM CHLORIDE, CALCIUM CHLORIDE 600; 310; 30; 20 MG/100ML; MG/100ML; MG/100ML; MG/100ML
INJECTION, SOLUTION INTRAVENOUS CONTINUOUS PRN
Status: DISCONTINUED | OUTPATIENT
Start: 2023-03-13 | End: 2023-03-13 | Stop reason: SDUPTHER

## 2023-03-13 RX ORDER — LORAZEPAM 2 MG/ML
0.5 INJECTION INTRAMUSCULAR
Status: ACTIVE | OUTPATIENT
Start: 2023-03-13 | End: 2023-03-14

## 2023-03-13 RX ORDER — HEPARIN SODIUM 5000 [USP'U]/.5ML
5000 INJECTION, SOLUTION INTRAVENOUS; SUBCUTANEOUS EVERY 8 HOURS
Status: DISCONTINUED | OUTPATIENT
Start: 2023-03-14 | End: 2023-03-13 | Stop reason: SDUPTHER

## 2023-03-13 RX ORDER — MEPERIDINE HYDROCHLORIDE 25 MG/ML
12.5 INJECTION INTRAMUSCULAR; INTRAVENOUS; SUBCUTANEOUS EVERY 5 MIN PRN
Status: DISCONTINUED | OUTPATIENT
Start: 2023-03-13 | End: 2023-03-15 | Stop reason: HOSPADM

## 2023-03-13 RX ORDER — HYDRALAZINE HYDROCHLORIDE 20 MG/ML
10 INJECTION INTRAMUSCULAR; INTRAVENOUS
Status: DISCONTINUED | OUTPATIENT
Start: 2023-03-13 | End: 2023-03-13 | Stop reason: HOSPADM

## 2023-03-13 RX ORDER — OXYCODONE HYDROCHLORIDE 5 MG/1
5 TABLET ORAL EVERY 4 HOURS PRN
Status: DISCONTINUED | OUTPATIENT
Start: 2023-03-13 | End: 2023-03-15 | Stop reason: HOSPADM

## 2023-03-13 RX ORDER — DIPHENHYDRAMINE HYDROCHLORIDE 50 MG/ML
12.5 INJECTION INTRAMUSCULAR; INTRAVENOUS
Status: DISCONTINUED | OUTPATIENT
Start: 2023-03-13 | End: 2023-03-13 | Stop reason: HOSPADM

## 2023-03-13 RX ORDER — ATENOLOL 50 MG/1
100 TABLET ORAL DAILY
Status: DISCONTINUED | OUTPATIENT
Start: 2023-03-13 | End: 2023-03-15 | Stop reason: HOSPADM

## 2023-03-13 RX ORDER — ACETAMINOPHEN 650 MG/1
650 SUPPOSITORY RECTAL
Status: ACTIVE | OUTPATIENT
Start: 2023-03-13 | End: 2023-03-14

## 2023-03-13 RX ORDER — SODIUM CHLORIDE, SODIUM LACTATE, POTASSIUM CHLORIDE, AND CALCIUM CHLORIDE .6; .31; .03; .02 G/100ML; G/100ML; G/100ML; G/100ML
IRRIGANT IRRIGATION PRN
Status: DISCONTINUED | OUTPATIENT
Start: 2023-03-13 | End: 2023-03-13 | Stop reason: HOSPADM

## 2023-03-13 RX ADMIN — REMIFENTANIL HYDROCHLORIDE 0.2 MCG/KG/MIN: 1 INJECTION, POWDER, LYOPHILIZED, FOR SOLUTION INTRAVENOUS at 07:46

## 2023-03-13 RX ADMIN — LIDOCAINE HYDROCHLORIDE 100 MG: 20 INJECTION, SOLUTION INTRAVENOUS at 07:49

## 2023-03-13 RX ADMIN — LABETALOL HYDROCHLORIDE 10 MG: 5 INJECTION, SOLUTION INTRAVENOUS at 18:51

## 2023-03-13 RX ADMIN — SODIUM CHLORIDE, POTASSIUM CHLORIDE, SODIUM LACTATE AND CALCIUM CHLORIDE: 600; 310; 30; 20 INJECTION, SOLUTION INTRAVENOUS at 06:50

## 2023-03-13 RX ADMIN — MIDAZOLAM HYDROCHLORIDE 2 MG: 2 INJECTION, SOLUTION INTRAMUSCULAR; INTRAVENOUS at 07:37

## 2023-03-13 RX ADMIN — DEXAMETHASONE 4 MG: 4 TABLET ORAL at 20:12

## 2023-03-13 RX ADMIN — FENTANYL CITRATE 50 MCG: 50 INJECTION, SOLUTION INTRAMUSCULAR; INTRAVENOUS at 09:11

## 2023-03-13 RX ADMIN — DEXAMETHASONE 4 MG: 4 TABLET ORAL at 14:15

## 2023-03-13 RX ADMIN — Medication 120 MG: at 07:49

## 2023-03-13 RX ADMIN — PHENYLEPHRINE HYDROCHLORIDE 100 MCG: 10 INJECTION INTRAVENOUS at 08:10

## 2023-03-13 RX ADMIN — SODIUM CHLORIDE, PRESERVATIVE FREE 10 ML: 5 INJECTION INTRAVENOUS at 21:05

## 2023-03-13 RX ADMIN — PHENYLEPHRINE HYDROCHLORIDE 25 MCG/MIN: 10 INJECTION INTRAVENOUS at 08:00

## 2023-03-13 RX ADMIN — MANNITOL 25 G: 20 INJECTION, SOLUTION INTRAVENOUS at 08:25

## 2023-03-13 RX ADMIN — PHENYLEPHRINE HYDROCHLORIDE 100 MCG: 10 INJECTION INTRAVENOUS at 08:17

## 2023-03-13 RX ADMIN — LEVETIRACETAM 1000 MG: 500 TABLET, FILM COATED ORAL at 20:12

## 2023-03-13 RX ADMIN — OXYCODONE HYDROCHLORIDE 5 MG: 5 TABLET ORAL at 20:12

## 2023-03-13 RX ADMIN — CEFAZOLIN 2000 MG: 2 INJECTION, POWDER, FOR SOLUTION INTRAMUSCULAR; INTRAVENOUS at 15:53

## 2023-03-13 RX ADMIN — SODIUM CHLORIDE: 9 INJECTION, SOLUTION INTRAVENOUS at 10:59

## 2023-03-13 RX ADMIN — DEXAMETHASONE SODIUM PHOSPHATE 10 MG: 4 INJECTION, SOLUTION INTRAMUSCULAR; INTRAVENOUS at 08:22

## 2023-03-13 RX ADMIN — SODIUM CHLORIDE, SODIUM LACTATE, POTASSIUM CHLORIDE, AND CALCIUM CHLORIDE: .6; .31; .03; .02 INJECTION, SOLUTION INTRAVENOUS at 07:37

## 2023-03-13 RX ADMIN — FENTANYL CITRATE 50 MCG: 50 INJECTION, SOLUTION INTRAMUSCULAR; INTRAVENOUS at 07:49

## 2023-03-13 RX ADMIN — PHENYLEPHRINE HYDROCHLORIDE 100 MCG: 10 INJECTION INTRAVENOUS at 08:23

## 2023-03-13 RX ADMIN — PROPOFOL 150 MG: 10 INJECTION, EMULSION INTRAVENOUS at 07:49

## 2023-03-13 RX ADMIN — ACETAMINOPHEN 650 MG: 325 TABLET ORAL at 22:03

## 2023-03-13 RX ADMIN — ACETAMINOPHEN 650 MG: 325 TABLET ORAL at 15:58

## 2023-03-13 RX ADMIN — SODIUM CHLORIDE: 9 INJECTION, SOLUTION INTRAVENOUS at 21:08

## 2023-03-13 RX ADMIN — PROPOFOL 150 MCG/KG/MIN: 10 INJECTION, EMULSION INTRAVENOUS at 07:46

## 2023-03-13 RX ADMIN — PROPOFOL 100 MG: 10 INJECTION, EMULSION INTRAVENOUS at 09:11

## 2023-03-13 RX ADMIN — CEFAZOLIN 2000 MG: 2 INJECTION, POWDER, FOR SOLUTION INTRAMUSCULAR; INTRAVENOUS at 07:37

## 2023-03-13 ASSESSMENT — PAIN SCALES - GENERAL
PAINLEVEL_OUTOF10: 2
PAINLEVEL_OUTOF10: 0
PAINLEVEL_OUTOF10: 4
PAINLEVEL_OUTOF10: 2
PAINLEVEL_OUTOF10: 4
PAINLEVEL_OUTOF10: 4

## 2023-03-13 ASSESSMENT — PAIN DESCRIPTION - ORIENTATION: ORIENTATION: LEFT

## 2023-03-13 ASSESSMENT — PAIN DESCRIPTION - LOCATION
LOCATION: INCISION;HEAD
LOCATION: INCISION;HEAD
LOCATION: HEAD

## 2023-03-13 ASSESSMENT — PAIN DESCRIPTION - DESCRIPTORS
DESCRIPTORS: ACHING

## 2023-03-13 ASSESSMENT — PAIN DESCRIPTION - ONSET
ONSET: GRADUAL
ONSET: GRADUAL

## 2023-03-13 ASSESSMENT — PAIN DESCRIPTION - FREQUENCY: FREQUENCY: INTERMITTENT

## 2023-03-13 ASSESSMENT — PAIN DESCRIPTION - PAIN TYPE
TYPE: SURGICAL PAIN
TYPE: SURGICAL PAIN

## 2023-03-13 NOTE — PROGRESS NOTES
PACU Transfer to Floor Note    Procedure(s):  LEFT FRONTAL CRANIOTOMY FOR RESECTION OF BRAIN MASS    Current Allergies: Patient has no known allergies. Pt meets criteria as per Pipe Score and ASPAN Standards to transfer to next phase of care. No results for input(s): POCGLU in the last 72 hours. Vitals:    03/13/23 1500   BP: (!) 147/81   Pulse: 76   Resp: 21   Temp: 97.2 °F (36.2 °C)   SpO2: 93%     Vitals within 20% of pt's admission vitals as per PIPE SCORE    SpO2: 93 %    O2 Flow Rate (L/min): 1 L/min      Intake/Output Summary (Last 24 hours) at 3/13/2023 1504  Last data filed at 3/13/2023 1500  Gross per 24 hour   Intake 427.1 ml   Output 1390 ml   Net -962.9 ml       Pain assessment:  none    Pain Level: 2    Patient was assessed for alterations to skin integrity. There were not alterations observed. Is patient incontinent: no    Handoff report given to NICOLE hernandez over the phone. [facility]) updated and directed to pt room. Pt tolerating fluids and crackers.   CT complete @1400.       3/13/2023 3:04 PM

## 2023-03-13 NOTE — PROGRESS NOTES
Report called to NICOLE hernandez on ICU. Called , Gene Diaz, to give update that pt is doing well and now has an assigned room 4508.

## 2023-03-13 NOTE — PROGRESS NOTES
Family ( Ronak Shanksr) updated on pt status and plan of care. Let them know pt is doing well in pacu but does not yet have a bed assignment. Pt glasses are now in pacu with pt.

## 2023-03-13 NOTE — PROGRESS NOTES
Pt to SDS for left frontal craniotomy for resection of brain mass. Pt is alert; oriented X 4; speech clear; breathing easily on RA; denies any pain; walks with steady gait without assist, and ambulated to bathroom in SDS. Pt does exhibit some anxiety regarding procedure and fact she at first had been directed to a different area for surgery. After warming pt, placed #18 IV in right hand and T&S X 2, BMP and PT/INR drawn and sent to lab. EKG completed at bedside. [facility] personnel came to bedside to talk with pt and family. CRNA requested second IV, and this RN placed #20 in left hand without problem. Pt also has a port-a-cath in left chest.  States last chemo and radiation was in January. Pt now to OR. Ancef 2 g IVPB to OR with pt.  Deion Duong has pt's purse and glasses.

## 2023-03-13 NOTE — PROGRESS NOTES
Pt arrived from OR s/p LEFT FRONTAL CRANIOTOMY FOR RESECTION OF BRAIN MASS with dr Missy Acevedo. Report given by crna, reported hemodynamically stable intraop but required some gilles at times. Pt arrived to PACU stable, awake, a+ox4, on 3 L NC, denies pain.  Surgical site open to air, c,d,I.

## 2023-03-13 NOTE — ANESTHESIA PRE PROCEDURE
Department of Anesthesiology  Preprocedure Note       Name:  Cheryl Carranza   Age:  59 y.o.  :  1965                                          MRN:  [id number]         Date:  3/13/2023      Surgeon: Gilda Gilmore):  Javier Tucker MD    Procedure: Procedure(s):  LEFT FRONTAL CRANIOTOMY FOR RESECTION OF BRAIN MASS    Medications prior to admission:   Prior to Admission medications    Medication Sig Start Date End Date Taking? Authorizing Provider   oxyCODONE-acetaminophen (PERCOCET) 5-325 MG per tablet Take 1 tablet by mouth every 6 hours as needed for Pain. Yes Historical Provider, MD   dexamethasone (DECADRON) 4 MG tablet Take 4 mg by mouth daily (with breakfast) 22  Yes Historical Provider, MD   levETIRAcetam (KEPPRA) 500 MG tablet Take 2 tablets by mouth 2 times daily 23   Mey Henderson MD   folic acid (FOLVITE) 1 MG tablet Take 1 tablet by mouth daily 23   Mey Henderson MD   pantoprazole (PROTONIX) 40 MG tablet Take 1 tablet by mouth every morning (before breakfast) 23   Mey Henderson MD   ondansetron (ZOFRAN-ODT) 4 MG disintegrating tablet Take 1 tablet by mouth every 8 hours as needed for Nausea or Vomiting 22   Laura Garcia MD   atenolol (TENORMIN) 100 MG tablet TK 1 T PO D 20   Historical Provider, MD   atorvastatin (LIPITOR) 40 MG tablet TAKE 1 TABLET EVERY DAY 4/15/15   Shayy Meyer, APRN - CNP       Current medications:    Current Facility-Administered Medications   Medication Dose Route Frequency Provider Last Rate Last Admin    ceFAZolin (ANCEF) 2,000 mg in sodium chloride 0.9 % 50 mL IVPB (mini-bag)  2,000 mg IntraVENous Once Travis Lai MD        lactated ringers IV soln infusion   IntraVENous Continuous Belle Servin MD           Allergies:  No Known Allergies    Problem List:    Patient Active Problem List   Diagnosis Code    HTN (hypertension), benign I10    Vitamin D deficiency E55.9    Vertigo R42    Dyslipidemia E78.5    Intraparenchymal hemorrhage of brain (HCC) I61.9    Lung mass R91.8    Mediastinal lymphadenopathy R59.0    Malignant neoplasm of upper lobe of left lung (HCC) C34.12    Poor venous access I87.8    Cerebral edema (HCC) G93.6    Brain metastases (HCC) C79.31       Past Medical History:        Diagnosis Date    Balance disorder     Vestibular neuritis (?)    [facility]     Dizziness     Vestibular Neuritis (?)    Hyperlipidemia     Hypertension     ICH (intracerebral hemorrhage) 05/10/2022    Liver cancer (HCC)     Malignant neoplasm of upper lobe of left lung (HCC)     Seizure ([facility] [address]     Vitamin D deficiency        Past Surgical History:        Procedure Laterality Date    ANKLE SURGERY Left     BRONCHOSCOPY N/A 2022    BRONCHOSCOPY BIOPSY BRONCHUS performed by Bobby Mullen MD at [id number] [place] [address] Left 2022    PORT A CATHETER PLACEMENT performed by Marquis Anne MD at [facility]       Social History:    Social History     Tobacco Use    Smoking status: Former     Years: .     Types: Cigarettes     Quit date:      Years since quittin.1    Smokeless tobacco: Never    Tobacco comments:     pt states she has chantix at home hasnt started yet   Substance Use Topics    Alcohol use: Not Currently                                Counseling given: Not Answered  Tobacco comments: pt states she has chantix at home hasnt started yet      Vital Signs (Current):   Vitals:    23 1028 23 0608   BP:  (!) 157/85   Pulse:  87   Resp:  18   Temp:  98.1 °F (36.7 °C)   TempSrc:  Temporal   SpO2:  95%   Weight: 163 lb (73.9 kg) 163 lb 12.8 oz (74.3 kg)   Height: 5' 6\" (1.676 m) 5' 5\" (1.651 m)                                              BP Readings from Last 3 Encounters:   23 (!) 157/85   23 119/73   22 (!) 165/92       NPO Status: Time of last liquid consumption: 1900                        Time of last solid consumption: 1900                        Date of last liquid consumption: 03/12/23                        Date of last solid food consumption: 03/12/23    BMI:   Wt Readings from Last 3 Encounters:   03/13/23 163 lb 12.8 oz (74.3 kg)   01/20/23 137 lb 9 oz (62.4 kg)   05/23/22 143 lb 9.6 oz (65.1 kg)     Body mass index is 27.26 kg/m². CBC:   Lab Results   Component Value Date/Time    WBC 4.8 01/19/2023 11:43 AM    RBC 4.38 01/19/2023 11:43 AM    HGB 14.2 01/19/2023 11:43 AM    HCT 43.6 01/19/2023 11:43 AM    MCV 99.4 01/19/2023 11:43 AM    RDW 13.2 01/19/2023 11:43 AM     01/19/2023 11:43 AM       CMP:   Lab Results   Component Value Date/Time     01/20/2023 06:09 AM    K 4.4 01/20/2023 06:09 AM    K 4.1 01/19/2023 11:43 AM     01/20/2023 06:09 AM    CO2 24 01/20/2023 06:09 AM    BUN 13 01/20/2023 06:09 AM    CREATININE 0.8 01/20/2023 06:09 AM    GFRAA >60 05/12/2022 04:51 AM    GFRAA >60 01/04/2011 12:01 PM    AGRATIO 1.2 01/19/2023 11:43 AM    LABGLOM >60 01/20/2023 06:09 AM    GLUCOSE 132 01/20/2023 06:09 AM    PROT 8.2 01/19/2023 11:43 AM    PROT 8.6 10/29/2010 11:08 AM    CALCIUM 10.4 01/20/2023 06:09 AM    BILITOT 0.6 01/19/2023 11:43 AM    ALKPHOS 102 01/19/2023 11:43 AM    AST 42 01/19/2023 11:43 AM    ALT 50 01/19/2023 11:43 AM       POC Tests: No results for input(s): POCGLU, POCNA, POCK, POCCL, POCBUN, POCHEMO, POCHCT in the last 72 hours.     Coags:   Lab Results   Component Value Date/Time    PROTIME 11.1 05/09/2022 06:15 PM    INR 0.98 05/09/2022 06:15 PM    APTT 44.5 05/09/2022 06:15 PM       HCG (If Applicable):   Lab Results   Component Value Date    PREGTESTUR Neg 03/22/2010        ABGs:   Lab Results   Component Value Date/Time    PHART 7.361 05/09/2022 06:48 PM    PO2ART 141.0 05/09/2022 06:48 PM    WCG7XLG 39.2 05/09/2022 06:48 PM    TSF6LQD 22.2 05/09/2022 06:48 PM    BEART -3.0 05/09/2022 06:48 PM    R5YNSHSH 99.9 05/09/2022 06:48 PM        Type & Screen (If Applicable):  No results found for: Deb Lilly    Drug/Infectious Status (If Applicable):  No results found for: HIV, HEPCAB    COVID-19 Screening (If Applicable): No results found for: COVID19        Anesthesia Evaluation  Patient summary reviewed and Nursing notes reviewed no history of anesthetic complications:   Airway: Mallampati: II  TM distance: >3 FB   Neck ROM: full  Mouth opening: > = 3 FB   Dental:          Pulmonary:                             ROS comment: Lung CA   Cardiovascular:    (+) hypertension:,                   Neuro/Psych:                ROS comment: Left Frontal Lobe Mass GI/Hepatic/Renal:   (+) liver disease:,           Endo/Other:                     Abdominal:             Vascular: Other Findings:           Anesthesia Plan      general     ASA 1    (49-year-old female presents for LEFT FRONTAL CRANIOTOMY FOR RESECTION OF BRAIN MASS. Plan general anesthesia with ASA standard monitors. Questions answered. Patient agreeable with anesthetic plan.  )  Induction: intravenous. Anesthetic plan and risks discussed with patient. Plan discussed with CRNA.     Attending anesthesiologist reviewed and agrees with Ifeoma Dye MD   3/13/2023

## 2023-03-13 NOTE — PROGRESS NOTES
, Lloyd Stevenson, at bedside for a visit since pt still does not have room assigned.  Pt now has purse and cell phone per pt request.

## 2023-03-13 NOTE — OP NOTE
Operative Report    PATIENT NAME: Layo Lundberg  YOB: 1965  MEDICAL RECORD# [id number]  SURGERY DATE: 3/13/2023  SURGEON:  Charles Garcia MD, PhD  ASSISTANT:  Hannah Rodriguez PA-C., served as assistant on this surgery due to the complex nature of the surgery and the lack of a resident or fellow assistant. She provided assistance with critical tissue retraction at parts of the surgery, wound closure and assistance with protecting critical neuro elements  DICTATED BY: Charles Garcia MD          PRE-OPERATIVE DIAGNOSIS: History left frontal metastasis     POST-OPERATIVE DIAGNOSIS: Radiation necrosis per frozen pathology     OPERATIVE PROCEDURES PERFORMED:  1. Stereotactic left frontal craniotomy for resection of tumor  2. Intraoperative Brainlab neuronavigation     SURGEON: Charles Garcia M.D., Ph.D.      ANESTHESIA: General endotracheal     ESTIMATED BLOOD LOSS:  50 mL. DRAINS: None     IMPLANTS:  1. Lulu cranial plating system     SPECIMEN:  1. Left frontal mass for frozen and permanent histology     DISPOSITION:  PACU in stable condition. INDICATIONS:  Mrs Ketan Rodriguez is a 59year old woman who presented with known history of lung cancer. She had been experiencing left sided weakness, especially in the left arm. MRI demonstrated a right frontal mass with extensive surrounding vasogenic edema and mass effect. Given the patients symptoms and the size of the solitary lesion, surgical resection was recommended to establish pathologic diagnosis and relieve mass effect/edema. The patient understands the risks and benefits of the procedure including but not limited to bleeding, infection, loss of vision, cognitive dysfunction, worsened neurologic injury, vascular injury, cerebral spinal fluid leak, inability to completely remove the mass, anesthesia risks, and death.      PROCEDURES IN DETAIL:       Under universal protocol and informed consent, the patient was brought to the operating room. General anesthesia was induced and the patient was intubated. She was placed supine and all pressure points were appropriately padded. The patient was then placed in [address] fixation with the head turned to the right. Brainlab Image Guidance was registered and verified. A linear incision was planned and shaved using neuronavigation to establish the optimum trajectory to the lesion. A Dominguez catheter and a radial arterial line were placed. Neuromonitoring leads for SSEPs, MEPs, and EEG monitoring were placed. Intravenous antibiotics (Ancef), Decadron, and Keppra were given by anesthesia. A time out was completed and the surgical site was prepped and draped in the usual sterile fashion. After infiltration of the skin with 0.5% Lidocaine with 1:100,000 Epinephrine, the cranial incision was made with a #15 scalpel. Bovie electrocautery was then used to complete the dissection down to the bone. After verifying the position of the tumor, a 4 mm cutter ball bit on a [employer] Rodrigo drill was used to drill a bur hole. Dura was dissected away from the bone using a #3 Penfield dissector without difficulty and a craniotomy was turned with the B1 footplate. The bone flap was elevated with a #1 Penfield dissector, and the underlying dura remained intact. Finally, using a #11 scalpel and Metzenbaum scissors, a C-shaped dural incision was made. An incision was made in the arachnoid, the sulcus was opened and progressively working to the depth of the tumor. We then carefully dissected circumferentially around the lesion, preserving the nat of the gyri adjacent to the mass. The vascular attachments were cauterized and cut, and the mass  from the white mater at the base. Extra-tumoral dissection was carried circumferentially around the lesion. The mass was removed en bloc. Tumor was then sent for both frozen and permanent histopathology with confirmation of a necrosis.  The remaining cavity was explored without clear additional tumor noted along its borders. Closure of the craniotomy was then initiated. The dura was closed primarily with running 4-0 Nurolon sutures. Adherus dural sealant was then sprayed liberally along the suture line to reduce the risk of CSF leak. The bone flap was secured with [place] Craniofacial miniplates and self-tapping 4 mm screws. The wound was copiously irrigated with antibiotic solution. The incision was then closed in layers using 2-0 Vicryl sutures in the galeal layer, 2-0 Vicryl sutures in the subcutaneous tissues, and staples in the skin. The wound was then dressed with antibiotic ointment, Telfa, and Medipore tape. All needle, sponge, and instrument counts were correct. Notably, SSEPs, MEPs, and EEG remained stable for the duration of the case. The patient was taken out of [place] 3-pin fixation, turned onto a hospital bed, extubated, and taken to the postoperative care unit in stable condition. I affirm in accordance with CMS regulations that I was present and scrubbed for all critical portions of the case.      COMPLICATIONS: None

## 2023-03-13 NOTE — ANESTHESIA POSTPROCEDURE EVALUATION
Department of Anesthesiology  Postprocedure Note    Patient: Mihai Miller  MRN: [id number]  [place]: 1965  Date of evaluation: 3/13/2023      Procedure Summary     Date: 03/13/23 Room / Location: [facility] [address]  / [facility]    Anesthesia Start: 7243 Anesthesia Stop: 1010    Procedure: LEFT FRONTAL CRANIOTOMY FOR RESECTION OF BRAIN MASS (Left: Head) Diagnosis:       Secondary malignant neoplasm of brain ([address]      (Secondary malignant neoplasm of brain ([address] [C79.31])    Surgeons: Stanislav Tucker MD Responsible Provider: Deep Ontiveros MD    Anesthesia Type: general ASA Status: 3          Anesthesia Type: No value filed.     Sinai Phase I: Sinai Score: 10    Sinai Phase II:        Anesthesia Post Evaluation    Patient location during evaluation: PACU  Patient participation: complete - patient participated  Level of consciousness: awake and alert  Airway patency: patent  Nausea & Vomiting: no nausea and no vomiting  Complications: no  Cardiovascular status: hemodynamically stable  Respiratory status: acceptable  Hydration status: euvolemic  Multimodal analgesia pain management approach

## 2023-03-13 NOTE — CONSULTS
NEUROCRITICAL CARE CONSULT NOTE       Travis Cooper MD is requesting this consult. Reason for Consult: Post-op ICU management   Admission Chief Complaint: Presented for elective surgery     History of Present Illness     Harris Castro is a 59 y.o. y/o female with hx of balance disorder, brain cancer, HLD, HTN, ICH, liver cancer, malignant neoplasm of L lung, and seizure who presents s/p elective stereotactic Left frontal craniotomy for resection of tumor. REVIEW OF SYSTEMS:   Constitutional- No weight loss or fevers   Eyes- No diplopia. No photophobia. Ears/nose/throat- No dysphagia. No Dysarthria   Cardiovascular- No palpitations. No chest pain   Respiratory- No dyspnea. No Cough   Gastrointestinal- No Abdominal pain. No Vomiting. Genitourinary- No incontinence. No urinary retention   Musculoskeletal- No myalgia. No arthralgia   Skin- No rash. No easy bruising. Psychiatric- No depression. No anxiety   Endocrine- No diabetes. No thyroid issues. Hematologic- No bleeding difficulty. No fatigue   Neurologic- No headache. No weakness. Past Medical, Surgical, Family, and Social History   PAST MEDICAL HISTORY:  Past Medical History:   Diagnosis Date    Balance disorder     Vestibular neuritis (?)    Brain cancer ([address]     Dizziness     Vestibular Neuritis (?)    Hyperlipidemia     Hypertension     ICH (intracerebral hemorrhage) 05/10/2022    Liver cancer ([address]     Malignant neoplasm of upper lobe of left lung ([address]     Seizure ([address]     Vitamin D deficiency      SURGICAL HISTORY:  Past Surgical History:   Procedure Laterality Date    ANKLE SURGERY Left     BRONCHOSCOPY N/A 5/12/2022    BRONCHOSCOPY BIOPSY BRONCHUS performed by Tea Anaya MD at [address] 3/13/2023    LEFT FRONTAL CRANIOTOMY FOR RESECTION OF BRAIN MASS performed by Derek Nuno.  Shay Cooper MD at [address] [place] [address] Left 5/23/2022    PORT A CATHETER PLACEMENT performed by Parth Collins MD at [facility] HISTORY:  Family history non-contributory  Family History   Problem Relation Age of Onset    Stroke Mother     Cancer Father      Social History     Tobacco Use    Smoking status: Former     Years: .00     Types: Cigarettes     Quit date:      Years since quittin.1    Smokeless tobacco: Never    Tobacco comments:     pt states she has chantix at home hasnt started yet   Substance Use Topics    Alcohol use: Not Currently    Drug use: No          Allergies & Outpatient Medications   ALLERGIES:  No Known Allergies  HOME MEDICATIONS:  Current Discharge Medication List        CONTINUE these medications which have NOT CHANGED    Details   oxyCODONE-acetaminophen (PERCOCET) 5-325 MG per tablet Take 1 tablet by mouth every 6 hours as needed for Pain.       dexamethasone (DECADRON) 4 MG tablet Take 4 mg by mouth daily (with breakfast)      levETIRAcetam (KEPPRA) 500 MG tablet Take 2 tablets by mouth 2 times daily  Qty: 60 tablet, Refills: 1      folic acid (FOLVITE) 1 MG tablet Take 1 tablet by mouth daily  Qty: 30 tablet, Refills: 0      pantoprazole (PROTONIX) 40 MG tablet Take 1 tablet by mouth every morning (before breakfast)  Qty: 30 tablet, Refills: 0      ondansetron (ZOFRAN-ODT) 4 MG disintegrating tablet Take 1 tablet by mouth every 8 hours as needed for Nausea or Vomiting  Qty: 30 tablet, Refills: 0      atenolol (TENORMIN) 100 MG tablet TK 1 T PO D      atorvastatin (LIPITOR) 40 MG tablet TAKE 1 TABLET EVERY DAY  Qty: 90 tablet, Refills: 0    Associated Diagnoses: Hyperlipidemia               Physical Exam   PHYSICAL EXAM:  BP (!) 152/83   Pulse 79   Temp 98 °F (36.7 °C) (Temporal)   Resp 22   Ht 5' 5\" (1.651 m)   Wt 163 lb 12.8 oz (74.3 kg)   SpO2 93%   BMI 27.26 kg/m²     General Alert, no distress, well-nourished    Neurologic  Mental status:   Orientation to person, place, time, situation  Attention intact as able to attend well to the exam    Language fluent in conversation  Comprehension intact; follows simple commands    Cranial nerves:   CN2: Visual fields full w/o extinction on confrontational testing  CN 3,4,6: Pupils equal and reactive to light, extraocular muscles intact  CN5: Facial sensation symmetric   CN7: Face symmetric  CN8: Hearing symmetric to spoken voice  CN9: Palate elevated symmetrically  CN11: Traps full strength on shoulder shrug  CN12: Tongue midline with protrusion    Motor Exam:  5/5 strength throughout    Sensory: light touch intact and symmetric in all 4 extremities. Cerebellar/coordination: finger nose finger normal without ataxia  Tone: normal in all 4 extremities  Incision: Clean, dry, intact      Cardiovascular: Warm, appears well perfused   Respiratory: Easy, non-labored respiratory pattern   Abdominal: Abdomen is without distention   Extremities: Upper and lower extremities are atraumatic in appearance without deformity. Lines: PIV x 2    Diagnostic Results   IMAGES:  Images personally reviewed and agree w/ radiology interpretation of post op changes  CT Head WO Contrast:  Impression   1. Status post left frontal craniotomy with resection of the previously identified enhancing lesion. No acute postoperative complication identified. LABS:  All results below personally reviewed. Pertinent positives & negatives are addressed in Impression & Plan below.      LABS   Metabolic Panel Recent Labs     03/13/23  0703      K 4.4      CO2 24   BUN 32*   CREATININE 0.7   GLUCOSE 108*   CALCIUM 10.0      CBC / Coags Recent Labs     03/13/23  0703   INR 0.91      Other Lab Results   Component Value Date/Time    LABA1C 4.5 05/11/2022 06:21 AM    LDLCALC 164 03/12/2020 04:53 AM    TRIG 108 03/12/2020 04:53 AM    TSH 1.22 10/29/2010 11:08 AM    FOLATE >20.00 01/19/2023 08:18 PM     Lab Results   Component Value Date/Time    LACTSEPSIS 0.8 05/09/2022 06:15 PM          CURRENT SCHEDULED MEDICATIONS Inpatient Medications     sodium chloride flush, 5-40 mL, IntraVENous, 2 times per day    atenolol, 100 mg, Oral, Daily    [START ON 3/14/2023] atorvastatin, 40 mg, Oral, Daily    levETIRAcetam, 1,000 mg, Oral, BID    [START ON 3/14/2023] pantoprazole, 40 mg, Oral, QAM AC    sodium chloride flush, 5-40 mL, IntraVENous, 2 times per day    acetaminophen, 650 mg, Oral, Q6H    dexamethasone, 4 mg, Oral, Q6H    ceFAZolin, 2,000 mg, IntraVENous, Q8H    [START ON 3/14/2023] heparin (porcine) PF, 5,000 Units, SubCUTAneous, Q8H   Infusions    sodium chloride      sodium chloride      sodium chloride 125 mL/hr at 03/13/23 1059      Antibiotics   Recent Abx Admin                     ceFAZolin (ANCEF) 2,000 mg in sodium chloride 0.9 % 50 mL IVPB (mini-bag) (mg) 2,000 mg New Bag 03/13/23 1553    ceFAZolin (ANCEF) 1,000 mg in sod chloride IRR soln 0.9 % 1,000 mL (mL) 1,000 mL Given 03/13/23 0843    ceFAZolin (ANCEF) 2,000 mg in sodium chloride 0.9 % 50 mL IVPB (mini-bag) (mg) 2,000 mg New Bag 03/13/23 0737                       IMPRESSION & PLAN     IMPRESSION:  Patient is a 59 y.o. y/o female who presents s/p elective stereotactic Left frontal craniotomy for resection of tumor. PLAN:  NEURO:  POD# 0 s/p resection  Q1H Neuro Checks   Post op antibx: Ancef 2000 mg for 3 doses  Post op head CT completed  MRI of brain w/ & w/o on POD#1  PT/OT - advance activity as tolerates   Keppra 1000 mg BID  Decadron 4 mg every 6 hours    RESPIRATORY:   Keep SP02 >92%  Aspirations precautions - HOB 30    CARDIAC:   Telemetry  SBP <160 - May back off some on post op fluids if pressure continuing to run high  PRN labetalol adjusted to reflect goal    GI / :   Dominguez discontinued  GI prophylaxis: pantoprazole (Protonix)  Bowel regimen: Senna & Glycolax     FEN:  IVFs: 0.9% NaCl @ 125 mL/hr  Diet: Advance as tolerates  Check BMP in AM    HEME:   DVT prophylaxis: SCD's and SQH in AM  Check CBC in AM     ENDOCRINE:  Accuchecks ACHS  POC Glucose TID while on steroids     PAIN MANAGEMENT:  PRN oxycodone / PRN dilaudid  PRN Muscle relaxer    Management and plan discussed with:  Nursing staff  Neurosurgery team    JACQUIE Gabmino CNP   Neurocritical Care   3/13/2023 5:22 PM  Neurocritical Care Line: [phone]  [facility]: [facility]    Critical Care:  Due to the immediate potential for life-threatening deterioration due to neurological failure, I spent 30 minutes providing critical care. This time excludes time spent performing procedures but includes time spent on direct patient care, history retrieval, review of the chart, and discussions with patient, family, and consultant(s).

## 2023-03-13 NOTE — H&P
I saw and examined Harris Castro on 3/13/2023  There has been no change to her history or physical in the interval time since consent. She will proceed with the planned procedure.      Pj Pepe PA-C

## 2023-03-13 NOTE — PROGRESS NOTES
4 Eyes Skin Assessment     The patient is being assess for   Post-Op Surgical    I agree that 2 RN's have performed a thorough Head to Toe Skin Assessment on the patient. ALL assessment sites listed below have been assessed. Areas assessed for pressure by both nurses:   [x]   Head, Face, and Ears   [x]   Shoulders, Back, and Chest, Abdomen  [x]   Arms, Elbows, and Hands   [x]   Coccyx, Sacrum, and Ischium  [x]   Legs, Feet, and Heels        Skin Assessed Under all Medical Devices by both nurses:     patient only has bedside monitor cords, skin assessed around cords. All Mepilex Borders were peeled back and area peeked at by both nurses:  Yes  Please list where Mepilex Borders are located:  sacrum              **SHARE this note so that the co-signing nurse is able to place an eSignature**    Co-signer eSignature: Electronically signed by Samir Morgan RN on 3/13/23 at 5:52 PM EDT    Does the Patient have Skin Breakdown related to pressure?   No     (Insert Photo here)         Mohan Prevention initiated:  Yes   Wound Care Orders initiated:  No      [facility] nurse consulted for Pressure Injury (Stage 3,4, Unstageable, DTI, NWPT, Complex wounds)and New or Established Ostomies:  NA      Primary Nurse eSignature: Electronically signed by Sparkle Driver RN on 3/13/23 at 5:43 PM EDT

## 2023-03-14 PROBLEM — Z98.890 S/P CRANIOTOMY: Status: ACTIVE | Noted: 2023-03-14

## 2023-03-14 LAB
ANION GAP SERPL CALCULATED.3IONS-SCNC: 11 MMOL/L (ref 3–16)
APTT: 28.2 SEC (ref 23–34.3)
BASOPHILS ABSOLUTE: 0 K/UL (ref 0–0.2)
BASOPHILS RELATIVE PERCENT: 0.1 %
BUN BLDV-MCNC: 21 MG/DL (ref 7–20)
CALCIUM SERPL-MCNC: 8.8 MG/DL (ref 8.3–10.6)
CHLORIDE BLD-SCNC: 98 MMOL/L (ref 99–110)
CO2: 25 MMOL/L (ref 21–32)
CREAT SERPL-MCNC: 0.7 MG/DL (ref 0.6–1.1)
EOSINOPHILS ABSOLUTE: 0 K/UL (ref 0–0.6)
EOSINOPHILS RELATIVE PERCENT: 0 %
GFR SERPL CREATININE-BSD FRML MDRD: >60 ML/MIN/{1.73_M2}
GLUCOSE BLD-MCNC: 123 MG/DL (ref 70–99)
HCT VFR BLD CALC: 40.3 % (ref 36–48)
HEMOGLOBIN: 13.3 G/DL (ref 12–16)
INR BLD: 0.97 (ref 0.87–1.14)
LYMPHOCYTES ABSOLUTE: 0.6 K/UL (ref 1–5.1)
LYMPHOCYTES RELATIVE PERCENT: 6.7 %
MCH RBC QN AUTO: 33.9 PG (ref 26–34)
MCHC RBC AUTO-ENTMCNC: 33 G/DL (ref 31–36)
MCV RBC AUTO: 102.7 FL (ref 80–100)
MONOCYTES ABSOLUTE: 0.9 K/UL (ref 0–1.3)
MONOCYTES RELATIVE PERCENT: 9.9 %
NEUTROPHILS ABSOLUTE: 7.9 K/UL (ref 1.7–7.7)
NEUTROPHILS RELATIVE PERCENT: 83.3 %
PDW BLD-RTO: 16 % (ref 12.4–15.4)
PLATELET # BLD: 129 K/UL (ref 135–450)
PMV BLD AUTO: 8.1 FL (ref 5–10.5)
POTASSIUM REFLEX MAGNESIUM: 4.3 MMOL/L (ref 3.5–5.1)
PROTHROMBIN TIME: 12.7 SEC (ref 11.7–14.5)
RBC # BLD: 3.93 M/UL (ref 4–5.2)
SODIUM BLD-SCNC: 134 MMOL/L (ref 136–145)
WBC # BLD: 9.5 K/UL (ref 4–11)

## 2023-03-14 PROCEDURE — 97116 GAIT TRAINING THERAPY: CPT

## 2023-03-14 PROCEDURE — 36415 COLL VENOUS BLD VENIPUNCTURE: CPT

## 2023-03-14 PROCEDURE — 94761 N-INVAS EAR/PLS OXIMETRY MLT: CPT

## 2023-03-14 PROCEDURE — 2580000003 HC RX 258: Performed by: FAMILY MEDICINE

## 2023-03-14 PROCEDURE — 97535 SELF CARE MNGMENT TRAINING: CPT

## 2023-03-14 PROCEDURE — 6360000002 HC RX W HCPCS: Performed by: PHYSICIAN ASSISTANT

## 2023-03-14 PROCEDURE — 2580000003 HC RX 258: Performed by: PHYSICIAN ASSISTANT

## 2023-03-14 PROCEDURE — 97162 PT EVAL MOD COMPLEX 30 MIN: CPT

## 2023-03-14 PROCEDURE — 6370000000 HC RX 637 (ALT 250 FOR IP): Performed by: PHYSICIAN ASSISTANT

## 2023-03-14 PROCEDURE — 85025 COMPLETE CBC W/AUTO DIFF WBC: CPT

## 2023-03-14 PROCEDURE — 1200000000 HC SEMI PRIVATE

## 2023-03-14 PROCEDURE — APPNB30 APP NON BILLABLE TIME 0-30 MINS: Performed by: NURSE PRACTITIONER

## 2023-03-14 PROCEDURE — 85610 PROTHROMBIN TIME: CPT

## 2023-03-14 PROCEDURE — 80048 BASIC METABOLIC PNL TOTAL CA: CPT

## 2023-03-14 PROCEDURE — 85730 THROMBOPLASTIN TIME PARTIAL: CPT

## 2023-03-14 PROCEDURE — 97165 OT EVAL LOW COMPLEX 30 MIN: CPT

## 2023-03-14 PROCEDURE — 99024 POSTOP FOLLOW-UP VISIT: CPT | Performed by: NURSE PRACTITIONER

## 2023-03-14 RX ORDER — HEPARIN SODIUM 5000 [USP'U]/ML
5000 INJECTION, SOLUTION INTRAVENOUS; SUBCUTANEOUS EVERY 8 HOURS
Status: DISCONTINUED | OUTPATIENT
Start: 2023-03-14 | End: 2023-03-15 | Stop reason: HOSPADM

## 2023-03-14 RX ADMIN — SODIUM CHLORIDE, PRESERVATIVE FREE 10 ML: 5 INJECTION INTRAVENOUS at 19:56

## 2023-03-14 RX ADMIN — ACETAMINOPHEN 650 MG: 325 TABLET ORAL at 04:30

## 2023-03-14 RX ADMIN — PANTOPRAZOLE SODIUM 40 MG: 40 TABLET, DELAYED RELEASE ORAL at 06:51

## 2023-03-14 RX ADMIN — DEXAMETHASONE 4 MG: 4 TABLET ORAL at 07:31

## 2023-03-14 RX ADMIN — CEFAZOLIN 2000 MG: 2 INJECTION, POWDER, FOR SOLUTION INTRAMUSCULAR; INTRAVENOUS at 00:57

## 2023-03-14 RX ADMIN — ATENOLOL 100 MG: 50 TABLET ORAL at 07:31

## 2023-03-14 RX ADMIN — OXYCODONE HYDROCHLORIDE 5 MG: 5 TABLET ORAL at 15:30

## 2023-03-14 RX ADMIN — ATORVASTATIN CALCIUM 40 MG: 40 TABLET, FILM COATED ORAL at 07:31

## 2023-03-14 RX ADMIN — ACETAMINOPHEN 650 MG: 325 TABLET ORAL at 21:35

## 2023-03-14 RX ADMIN — ACETAMINOPHEN 650 MG: 325 TABLET ORAL at 15:30

## 2023-03-14 RX ADMIN — OXYCODONE HYDROCHLORIDE 5 MG: 5 TABLET ORAL at 07:15

## 2023-03-14 RX ADMIN — DEXAMETHASONE 4 MG: 4 TABLET ORAL at 19:54

## 2023-03-14 RX ADMIN — OXYCODONE HYDROCHLORIDE 5 MG: 5 TABLET ORAL at 11:33

## 2023-03-14 RX ADMIN — CEFAZOLIN 2000 MG: 2 INJECTION, POWDER, FOR SOLUTION INTRAMUSCULAR; INTRAVENOUS at 07:30

## 2023-03-14 RX ADMIN — SODIUM CHLORIDE, PRESERVATIVE FREE 10 ML: 5 INJECTION INTRAVENOUS at 19:57

## 2023-03-14 RX ADMIN — LEVETIRACETAM 1000 MG: 500 TABLET, FILM COATED ORAL at 07:31

## 2023-03-14 RX ADMIN — OXYCODONE HYDROCHLORIDE 5 MG: 5 TABLET ORAL at 19:14

## 2023-03-14 RX ADMIN — LEVETIRACETAM 1000 MG: 500 TABLET, FILM COATED ORAL at 19:54

## 2023-03-14 RX ADMIN — DEXAMETHASONE 4 MG: 4 TABLET ORAL at 02:02

## 2023-03-14 RX ADMIN — ACETAMINOPHEN 650 MG: 325 TABLET ORAL at 09:46

## 2023-03-14 RX ADMIN — OXYCODONE HYDROCHLORIDE 5 MG: 5 TABLET ORAL at 01:00

## 2023-03-14 RX ADMIN — HEPARIN SODIUM 5000 UNITS: 5000 INJECTION INTRAVENOUS; SUBCUTANEOUS at 07:31

## 2023-03-14 RX ADMIN — DEXAMETHASONE 4 MG: 4 TABLET ORAL at 14:05

## 2023-03-14 RX ADMIN — HEPARIN SODIUM 5000 UNITS: 5000 INJECTION INTRAVENOUS; SUBCUTANEOUS at 17:24

## 2023-03-14 ASSESSMENT — PAIN DESCRIPTION - LOCATION
LOCATION: HEAD
LOCATION: INCISION;HEAD
LOCATION: HEAD
LOCATION: HEAD;INCISION
LOCATION: HEAD

## 2023-03-14 ASSESSMENT — PAIN DESCRIPTION - DESCRIPTORS
DESCRIPTORS: ACHING

## 2023-03-14 ASSESSMENT — PAIN SCALES - GENERAL
PAINLEVEL_OUTOF10: 5
PAINLEVEL_OUTOF10: 0
PAINLEVEL_OUTOF10: 5
PAINLEVEL_OUTOF10: 4
PAINLEVEL_OUTOF10: 4
PAINLEVEL_OUTOF10: 5
PAINLEVEL_OUTOF10: 0
PAINLEVEL_OUTOF10: 4
PAINLEVEL_OUTOF10: 4
PAINLEVEL_OUTOF10: 0
PAINLEVEL_OUTOF10: 4
PAINLEVEL_OUTOF10: 4

## 2023-03-14 NOTE — PROGRESS NOTES
Occupational Therapy  Facility/Department: [address] [place] [address] ICU  Occupational Therapy Initial Assessment, Treatment and D/c Note     Name: Daryle Kid  : 1965  MRN: [id number]  Date of Service: 3/14/2023    Discharge Recommendations:Azalea Pabon scored a 23/24 on the AM-PAC ADL Inpatient form. At this time, no further OT is recommended upon discharge. Recommend patient returns to prior setting with prior services. OT Equipment Recommendations  Equipment Needed: No       Patient Diagnosis(es): The encounter diagnosis was Secondary malignant neoplasm of brain ([facility] [address] Past Medical History:  has a past medical history of Balance disorder, Brain cancer ([facility] [address] Dizziness, Hyperlipidemia, Hypertension, ICH (intracerebral hemorrhage), Liver cancer ([facility] [address] Malignant neoplasm of upper lobe of left lung ([facility] [address] Seizure ([facility] [address] and Vitamin D deficiency. Past Surgical History:  has a past surgical history that includes Ankle surgery (Left); Colonoscopy; bronchoscopy (N/A, 2022); Port Surgery (Left, 2022); and craniotomy (Left, 3/13/2023). Assessment   Assessment: Pt from home with spouse. Pt doing very well on POD#1. No acute OT needs. No DME needs. Will sign off from OT services. Pt edu on freq mobility / OOB. Verb understanding. Prognosis: Good  Decision Making: Low Complexity  No Skilled OT: Safe to return home; No OT goals identified  REQUIRES OT FOLLOW-UP: No  Activity Tolerance  Activity Tolerance: Patient Tolerated treatment well        Plan D/c Acute OT services.          Restrictions  Position Activity Restriction  Other position/activity restrictions: up as tolerated    Subjective   General  Chart Reviewed: Yes  Patient assessed for rehabilitation services?: Yes  Additional Pertinent Hx: Admit 3/13 to ICU with Brain Lesion  s/p LEFT FRONTAL CRANIOTOMY FOR RESECTION OF BRAIN MASS                     PMHX:  balance disorder, brain cancer, HLD, HTN, ICH, liver cancer, malignant neoplasm of L lung, and seizure  Family / Caregiver Present: No  Diagnosis: Brain Lesion s/p LEFT FRONTAL CRANIOTOMY FOR RESECTION OF BRAIN MASS   on 3/13  Subjective  Subjective: \" I can't believe I was miss diagnosed -- Pt agreeable for OOB/OT evl and tx -- found resting in bed     Social/Functional History  Social/Functional History  Lives With: Spouse  Type of Home: House  Home Layout: Two level (bi-level)  Home Access: Stairs to enter with rails  Entrance Stairs - Number of Steps: 12  Entrance Stairs - Rails: Both  Bathroom Shower/Tub: Tub/Shower unit  Bathroom Toilet: Handicap height  Bathroom Equipment: None  Home Equipment: None  Has the patient had two or more falls in the past year or any fall with injury in the past year?: No  Receives Help From:  (independent)  ADL Assistance: Independent  Homemaking Assistance: Independent  Homemaking Responsibilities: Yes  Ambulation Assistance: Independent  Transfer Assistance: Independent  Active : Yes  Mode of Transportation: Car  Occupation: Works at home  Type of Occupation: [employer]       Objective Treatment included functional transfer training, ADL's and pt. education. Safety Devices  Type of Devices: Bed alarm in place;Call light within reach; Left in bed;Nurse notified (pt declined to sit up in chair d/t fatigue, \"I have not had the chance to rest\")     Toilet Transfers  Toilet - Technique: Ambulating  Toilet Transfer: Modified independent; Independent  Toilet Transfers Comments: with rail -- has similar setup at home  AROM: Within functional limits  PROM: Within functional limits  Coordination: Within functional limits  Tone: Normal  Sensation: Intact  ADL  Feeding: Independent  Grooming: Independent  LE Dressing: Modified independent   Toileting: Independent; Modified independent   Additional Comments: Pt edu on sitting with LE ADLs for safety-- verb understanding     Activity Tolerance  Activity Tolerance: Patient tolerated evaluation without incident Transfers  Sit to stand: Independent  Stand to sit:  Independent  Vision  Vision: Impaired  Vision Exceptions: Wears glasses at all times  Hearing  Hearing: Within functional limits  Orientation  Overall Orientation Status: Within Normal Limits  Orientation Level: Oriented X4      Education Given To: Patient  Education Provided: Role of Therapy;Plan of Care;IADL Safety;Transfer Training  Education Method: Demonstration;Verbal  Barriers to Learning: None  Education Outcome: Verbalized understanding     AM-PAC Score  AM-PAC Inpatient Daily Activity Raw Score: 23 (03/14/23 1101)  AM-PAC Inpatient ADL T-Scale Score : 51.12 (03/14/23 1101)  ADL Inpatient CMS 0-100% Score: 15.86 (03/14/23 1101)  ADL Inpatient CMS G-Code Modifier : CI (03/14/23 1101)      Therapy Time   Individual Concurrent Group Co-treatment   Time In 0943         Time Out 1010         Minutes 27             Timed Code Treatment Minutes:   10 mins     Total Treatment Minutes:  27 mins       Shanna Wang, OT

## 2023-03-14 NOTE — PROGRESS NOTES
NEUROSURGERY POST-OP PROGRESS NOTE    Patient Name: Cruz De La Rosa YOB: 1965   Sex: Female Age: 59 yrs     Medical Record Number: [id number] Acct Number: [de-identified]   Room Number: 4035/2750-00 Hospital Day: Hospital Day: 2     Interval History:  Post-operative Day# 1 s/p Procedure(s) (LRB):  LEFT FRONTAL CRANIOTOMY FOR RESECTION OF BRAIN MASS (Left)    Subjective: Pt sitting up in bed. No c/o    Objective:    VITAL SIGNS   BP (!) 161/84   Pulse 75   Temp 98.2 °F (36.8 °C) (Temporal)   Resp 16   Ht 5' 5\" (1.651 m)   Wt 164 lb 0.4 oz (74.4 kg)   SpO2 91%   BMI 27.29 kg/m²    Height Height: 5' 5\" (165.1 cm)   Weight Weight: 164 lb 0.4 oz (74.4 kg)        Allergies No Known Allergies   NPO Status ADULT DIET;  Regular   Isolation No active isolations     LABS   Basic Metabolic Profile Recent Labs     03/13/23  0703 03/14/23  0513    134*    98*   CO2 24 25   BUN 32* 21*   CREATININE 0.7 0.7   GLUCOSE 108* 123*      Complete Blood Count Recent Labs     03/14/23  0513   WBC 9.5   RBC 3.93*      Coagulation Studies Recent Labs     03/13/23  0703 03/14/23  0513   INR 0.91 0.97        MEDICATIONS   Inpatient Medications     heparin (porcine), 5,000 Units, SubCUTAneous, Q8H    sodium chloride flush, 5-40 mL, IntraVENous, 2 times per day    atenolol, 100 mg, Oral, Daily    atorvastatin, 40 mg, Oral, Daily    levETIRAcetam, 1,000 mg, Oral, BID    pantoprazole, 40 mg, Oral, QAM AC    sodium chloride flush, 5-40 mL, IntraVENous, 2 times per day    acetaminophen, 650 mg, Oral, Q6H    dexamethasone, 4 mg, Oral, Q6H   Infusions    sodium chloride      sodium chloride      sodium chloride 100 mL/hr at 03/14/23 0525      Antibiotics   Recent Abx Admin                     ceFAZolin (ANCEF) 2,000 mg in sodium chloride 0.9 % 50 mL IVPB (mini-bag) (mg) 2,000 mg New Bag 03/14/23 0730     2,000 mg New Bag  0057     2,000 mg New Bag 03/13/23 1553                     Neurologic Exam:  Mental status: awake and alert and oriented x4    Cranial Nerves:  II: Visual acuity not tested, visual fields full, denies new visual changes / diplopia  III, IV, VI: PERRL, 3 mm bilaterally, EOMI, no nystagmus noted  V: Facial sensation intact bilaterally to touch  VII: Face symmetric  VIII: Hearing intact bilaterally to spoken voice  IX: Palate movement equal bilaterally  XI: Shoulder shrug equal bilaterally  XII: Tongue midline      Musculoskeletal:   Gait: Not tested   Tone: normal  Sensory: intact to all extremities  Motor strength:    Right  Left    Right  Left    Deltoid  5 5  Hip Flex  5 5   Biceps  5 5  Knee Extensors  5 5   Triceps  5 5  Knee Flexors  5 5   Wrist Ext  5 5  Ankle Dorsiflex. 5 5   Wrist Flex  5 5  Ankle Plantarflex. 5 5   Handgrip  5 5  Ext Luis E Longus  5 5   Thumb Ext  5 5         Incision: intact, clean and dry    Respiratory:  Unlabored respiratory pattern    Abdomen:   Soft, ND   Last BMpreop    Cardiovascular:  Warm, well perfused    Assessment   Patient is a 58 yo F s/p Procedure(s) (LRB):  LEFT FRONTAL CRANIOTOMY FOR RESECTION OF BRAIN MASS (Left) per Dr. Betzaida Mcintosh . Pt educated from [employer] to Neurosurgery\" book pg  25-27, 33-38. Plan:  Neurologic exam frequency:q4  Mobility:PT/OT eval  Steroids: taper, SSI  Seizure Prophylaxis:keppra as ordered  Diet:ADAT  Antibiotics: post op  Dominguez:remove  DVT Prophylaxis: SCDs and heparin  GI Prophylaxis:protonix  Bowel Regimen: senna and glycolax  Pain control:ricky   Incisional Care:open to air and may shower  MRI today  Dispo Planning:may transfer to      Patient was seen with Dr. Betzaida Mcintosh who agrees with above assessment and plan. Electronically signed by:  JACQUIE Gonsales CNP, 3/14/2023 12:24 PM   Neurosurgery Nurse Practitioner  [phone]

## 2023-03-14 NOTE — PROGRESS NOTES
Physical Therapy  Facility/Department: [address] [place] [address] ICU  Physical Therapy Initial Assessment and Discharge    Name: Jessica Ruelas  : 1965  MRN: [id number]  Date of Service: 3/14/2023    Discharge Recommendations: Jessica Ruelas scored a 23/24 on the AM-PAC short mobility form. At this time, no further PT is recommended upon discharge due to patient functioning at baseline level. Recommend patient returns to prior setting with prior services. Patient Diagnosis(es): The encounter diagnosis was Secondary malignant neoplasm of brain ([facility] Past Medical History:  has a past medical history of Balance disorder, Brain cancer ([facility] [address] Dizziness, Hyperlipidemia, Hypertension, ICH (intracerebral hemorrhage), Liver cancer ([facility] [address] Malignant neoplasm of upper lobe of left lung ([facility] [address] Seizure ([facility] [address] and Vitamin D deficiency. Past Surgical History:  has a past surgical history that includes Ankle surgery (Left); Colonoscopy; bronchoscopy (N/A, 2022); Port Surgery (Left, 2022); and craniotomy (Left, 3/13/2023). Assessment   Assessment: Patient presents post-op LEFT FRONTAL CRANIOTOMY FOR RESECTION OF BRAIN MASS. She is completing all transfers and mobility with no more than supervision-SBA. She appears to be at or close to baseline mobility. No signs of residual neurological deficits at this time. She is educated on pacing activity and safety. Patient to return home with assist as needed. D/c PT at this time as she does not have acute PT needs.   Treatment Diagnosis: decreased functional mobility  Requires PT Follow-Up: No  Activity Tolerance  Activity Tolerance: Patient tolerated evaluation without incident     Plan   Physcial Therapy Plan  General Plan: Discharge with evaluation only  Safety Devices  Type of Devices: Bed alarm in place, Call light within reach, Left in bed, Nurse notified (pt declined to sit up in chair d/t fatigue, \"I have not had the chance to rest\")     Restrictions  Position Activity Restriction  Other position/activity restrictions: up as tolerated     Subjective   General  Chart Reviewed: Yes  Patient assessed for rehabilitation services?: Yes  Additional Pertinent Hx: Patient presents POD#1 LEFT FRONTAL CRANIOTOMY FOR RESECTION OF BRAIN MASS. PMH: intracerebral hemorrhage, HTN, hyperlipidemia, brain cancer, liver cancer, malignant neoplasm of upper lobe of L lung. Diagnosis: secondary malignant neoplasm of brain  Subjective  Subjective: Patient supine at arrival and agreeable to evaluation. Patient states she is not in pain but is swollen from the steroids. Social/Functional History  Social/Functional History  Lives With: Spouse  Type of Home: House  Home Layout: Two level (bi-level)  Home Access: Stairs to enter with rails  Entrance Stairs - Number of Steps: 12  Entrance Stairs - Rails: Both  Bathroom Shower/Tub: Tub/Shower unit  Bathroom Toilet: Handicap height  Bathroom Equipment: None  Home Equipment: None  Has the patient had two or more falls in the past year or any fall with injury in the past year?: No  Receives Help From:  (independent)  ADL Assistance: Independent  Homemaking Assistance: Independent  Homemaking Responsibilities: Yes  Ambulation Assistance: Independent  Transfer Assistance: Independent  Active : Yes  Mode of Transportation: Car  Occupation: Works at home  Type of Occupation: [employer]  Vision/Hearing  Vision  Vision: Impaired  Vision Exceptions: Wears glasses at all times  Hearing  Hearing: Within functional limits    Cognition   Orientation  Overall Orientation Status: Within Normal Limits  Orientation Level: Oriented X4     Objective      Gross Assessment  AROM: Within functional limits  Strength:  Within functional limits      Bed mobility  Supine to Sit: Modified independent (HOB raised)  Sit to Supine: Modified independent  Scooting: Independent  Transfers  Sit to Stand: Supervision  Stand to Sit: Supervision  Ambulation  Device: No Device  Assistance: Supervision  Distance: 400ft  Comments: appears to ambulate at baseline level  Stairs/Curb  Stairs?: Yes  Stairs  # Steps : 4  Stairs Height: 6\"  Rails: Right ascending  Curbs: 6\"  Device: No Device  Assistance: Stand by assistance  Comment: reciprocal gait pattern; able to correct balance descending     Balance  Sitting - Static: Good  Standing - Static: Good  Standing - Dynamic: Good    AM-PAC Score  AM-PAC Inpatient Mobility Raw Score : 23 (03/14/23 1038)  AM-PAC Inpatient T-Scale Score : 56.93 (03/14/23 1038)  Mobility Inpatient CMS 0-100% Score: 11.2 (03/14/23 1038)  Mobility Inpatient CMS G-Code Modifier : CI (03/14/23 1038)    Education  Patient Education  Education Given To: Patient  Education Provided: Role of Therapy  Education Method: Verbal  Barriers to Learning: None  Education Outcome: Verbalized understanding      Therapy Time   Individual Concurrent Group Co-treatment   Time In 0943         Time Out 1009         Minutes 26          Timed Code Treatment Minutes:   11    Total Treatment Minutes:  [employer] [employer] SPT

## 2023-03-15 ENCOUNTER — APPOINTMENT (OUTPATIENT)
Dept: MRI IMAGING | Age: 58
DRG: 025 | End: 2023-03-15
Attending: NEUROLOGICAL SURGERY
Payer: COMMERCIAL

## 2023-03-15 VITALS
WEIGHT: 173.06 LBS | OXYGEN SATURATION: 99 % | TEMPERATURE: 97.6 F | SYSTOLIC BLOOD PRESSURE: 135 MMHG | HEART RATE: 71 BPM | HEIGHT: 65 IN | RESPIRATION RATE: 15 BRPM | DIASTOLIC BLOOD PRESSURE: 89 MMHG | BODY MASS INDEX: 28.83 KG/M2

## 2023-03-15 LAB
ANION GAP SERPL CALCULATED.3IONS-SCNC: 11 MMOL/L (ref 3–16)
BUN SERPL-MCNC: 27 MG/DL (ref 7–20)
CALCIUM SERPL-MCNC: 8.8 MG/DL (ref 8.3–10.6)
CHLORIDE SERPL-SCNC: 98 MMOL/L (ref 99–110)
CO2 SERPL-SCNC: 24 MMOL/L (ref 21–32)
CREAT SERPL-MCNC: 0.7 MG/DL (ref 0.6–1.1)
GFR SERPLBLD CREATININE-BSD FMLA CKD-EPI: >60 ML/MIN/{1.73_M2}
GLUCOSE BLD-MCNC: 107 MG/DL (ref 70–99)
GLUCOSE SERPL-MCNC: 134 MG/DL (ref 70–99)
PERFORMED ON: ABNORMAL
POTASSIUM SERPL-SCNC: 4.3 MMOL/L (ref 3.5–5.1)
SODIUM SERPL-SCNC: 133 MMOL/L (ref 136–145)

## 2023-03-15 PROCEDURE — 36415 COLL VENOUS BLD VENIPUNCTURE: CPT

## 2023-03-15 PROCEDURE — 99024 POSTOP FOLLOW-UP VISIT: CPT | Performed by: NURSE PRACTITIONER

## 2023-03-15 PROCEDURE — APPNB30 APP NON BILLABLE TIME 0-30 MINS: Performed by: NURSE PRACTITIONER

## 2023-03-15 PROCEDURE — A9579 GAD-BASE MR CONTRAST NOS,1ML: HCPCS | Performed by: PHYSICIAN ASSISTANT

## 2023-03-15 PROCEDURE — 80048 BASIC METABOLIC PNL TOTAL CA: CPT

## 2023-03-15 PROCEDURE — 6360000004 HC RX CONTRAST MEDICATION: Performed by: PHYSICIAN ASSISTANT

## 2023-03-15 PROCEDURE — 6370000000 HC RX 637 (ALT 250 FOR IP): Performed by: PHYSICIAN ASSISTANT

## 2023-03-15 PROCEDURE — 6360000002 HC RX W HCPCS: Performed by: PHYSICIAN ASSISTANT

## 2023-03-15 PROCEDURE — 2580000003 HC RX 258: Performed by: PHYSICIAN ASSISTANT

## 2023-03-15 PROCEDURE — 70553 MRI BRAIN STEM W/O & W/DYE: CPT

## 2023-03-15 PROCEDURE — 2580000003 HC RX 258: Performed by: FAMILY MEDICINE

## 2023-03-15 RX ORDER — OXYCODONE HYDROCHLORIDE 5 MG/1
5 TABLET ORAL EVERY 6 HOURS PRN
Qty: 28 TABLET | Refills: 0 | Status: SHIPPED | OUTPATIENT
Start: 2023-03-15 | End: 2023-03-15 | Stop reason: SDUPTHER

## 2023-03-15 RX ORDER — OXYCODONE HYDROCHLORIDE 5 MG/1
5 TABLET ORAL EVERY 6 HOURS PRN
Qty: 28 TABLET | Refills: 0 | Status: SHIPPED | OUTPATIENT
Start: 2023-03-15 | End: 2023-03-22

## 2023-03-15 RX ORDER — DEXAMETHASONE 4 MG/1
TABLET ORAL
Qty: 32 TABLET | Refills: 0 | Status: SHIPPED | OUTPATIENT
Start: 2023-03-15 | End: 2023-03-15 | Stop reason: SDUPTHER

## 2023-03-15 RX ORDER — DEXAMETHASONE 4 MG/1
TABLET ORAL
Qty: 32 TABLET | Refills: 0 | Status: SHIPPED | OUTPATIENT
Start: 2023-03-15 | End: 2023-03-29

## 2023-03-15 RX ADMIN — ACETAMINOPHEN 650 MG: 325 TABLET ORAL at 04:37

## 2023-03-15 RX ADMIN — ACETAMINOPHEN 650 MG: 325 TABLET ORAL at 10:56

## 2023-03-15 RX ADMIN — HEPARIN SODIUM 5000 UNITS: 5000 INJECTION INTRAVENOUS; SUBCUTANEOUS at 00:42

## 2023-03-15 RX ADMIN — SODIUM CHLORIDE, PRESERVATIVE FREE 10 ML: 5 INJECTION INTRAVENOUS at 08:44

## 2023-03-15 RX ADMIN — DEXAMETHASONE 4 MG: 4 TABLET ORAL at 08:44

## 2023-03-15 RX ADMIN — GADOTERIDOL 15 ML: 279.3 INJECTION, SOLUTION INTRAVENOUS at 03:58

## 2023-03-15 RX ADMIN — PANTOPRAZOLE SODIUM 40 MG: 40 TABLET, DELAYED RELEASE ORAL at 06:16

## 2023-03-15 RX ADMIN — DEXAMETHASONE 4 MG: 4 TABLET ORAL at 00:43

## 2023-03-15 RX ADMIN — LEVETIRACETAM 1000 MG: 500 TABLET, FILM COATED ORAL at 08:44

## 2023-03-15 RX ADMIN — ATENOLOL 100 MG: 50 TABLET ORAL at 08:44

## 2023-03-15 RX ADMIN — ATORVASTATIN CALCIUM 40 MG: 40 TABLET, FILM COATED ORAL at 08:44

## 2023-03-15 RX ADMIN — HEPARIN SODIUM 5000 UNITS: 5000 INJECTION INTRAVENOUS; SUBCUTANEOUS at 08:44

## 2023-03-15 ASSESSMENT — PAIN SCALES - GENERAL: PAINLEVEL_OUTOF10: 0

## 2023-03-15 NOTE — PLAN OF CARE
Problem: Discharge Planning  Goal: Discharge to home or other facility with appropriate resources  Outcome: Progressing  Flowsheets (Taken 3/14/2023 2000)  Discharge to home or other facility with appropriate resources: Identify barriers to discharge with patient and caregiver     Problem: Pain  Goal: Verbalizes/displays adequate comfort level or baseline comfort level  Outcome: Progressing  Flowsheets (Taken 3/14/2023 1945)  Verbalizes/displays adequate comfort level or baseline comfort level: Encourage patient to monitor pain and request assistance     Problem: Safety - Adult  Goal: Free from fall injury  Outcome: Progressing  Flowsheets (Taken 3/14/2023 2253)  Free From Fall Injury: Instruct family/caregiver on patient safety     Problem: ABCDS Injury Assessment  Goal: Absence of physical injury  Outcome: Progressing  Flowsheets (Taken 3/14/2023 2253)  Absence of Physical Injury: Implement safety measures based on patient assessment

## 2023-03-15 NOTE — DISCHARGE SUMMARY
Discharge Summary    Date of Admission: 3/13/2023  6:02 AM  Date of Discharge: 3/15/23  Admission Diagnosis: Secondary malignant neoplasm of brain (HCC) [C79.31]  Discharge Diagnosis: Same   Condition on Discharge: good  Attending for Admission: Travis Lai MD  Procedures: Procedure(s) (LRB):  LEFT FRONTAL CRANIOTOMY FOR RESECTION OF BRAIN MASS (Left)  Consults: IP CONSULT TO NEUROCRITICAL CARE    Reason for Admission:  Azalea Blount is a 57 y.o. female patient who was admitted to the hospital with known history of lung cancer. She had been experiencing head ache. MRI demonstrated a right frontal mass with extensive surrounding vasogenic edema and mass effect. . She underwent the procedure listed above on 3/13/2023.     Hospital Course:  After surgery, Her pre-operative  headache was improved. She complained of incisional pain. The pain was well-controlled on oral medications.  The incision was clean, dry and intact. There was no erythema or edema around the surgical site. Prior to discharge She was eating well, urinating and ambulating with a steady gait.    Discharge Vitals/Labs:  /73   Pulse 66   Temp 98 °F (36.7 °C) (Temporal)   Resp 13   Ht 5' 5\" (1.651 m)   Wt 173 lb 1 oz (78.5 kg)   SpO2 92%   BMI 28.80 kg/m²   CBC:   Lab Results   Component Value Date/Time    WBC 9.5 03/14/2023 05:13 AM    RBC 3.93 03/14/2023 05:13 AM    HGB 13.3 03/14/2023 05:13 AM    HCT 40.3 03/14/2023 05:13 AM    .7 03/14/2023 05:13 AM    MCH 33.9 03/14/2023 05:13 AM    MCHC 33.0 03/14/2023 05:13 AM    RDW 16.0 03/14/2023 05:13 AM     03/14/2023 05:13 AM    MPV 8.1 03/14/2023 05:13 AM     CMP:    Lab Results   Component Value Date/Time     03/15/2023 05:18 AM    K 4.3 03/15/2023 05:18 AM    CL 98 03/15/2023 05:18 AM    CO2 24 03/15/2023 05:18 AM    BUN 27 03/15/2023 05:18 AM    CREATININE 0.7 03/15/2023 05:18 AM    GFRAA >60 05/12/2022 04:51 AM    GFRAA >60 01/04/2011 12:01 PM    AGRATIO  1.2 01/19/2023 11:43 AM    LABGLOM >60 03/15/2023 05:18 AM    GLUCOSE 134 03/15/2023 05:18 AM    PROT 8.2 01/19/2023 11:43 AM    PROT 8.6 10/29/2010 11:08 AM    LABALBU 4.1 01/20/2023 06:09 AM    CALCIUM 8.8 03/15/2023 05:18 AM    BILITOT 0.6 01/19/2023 11:43 AM    ALKPHOS 102 01/19/2023 11:43 AM    AST 42 01/19/2023 11:43 AM    ALT 50 01/19/2023 11:43 AM       Discharge Medications: The patient suffers from a major neurological surgery that pain cannot be managed within an average of 30 MED per day. Severe acute postoperative pain is the reason for exceeding the 30 MED average, and the prescription reflects the same dosage patient received while inpatient, which is the lowest dose consistent with the patients medical condition. Non-opioid treatment options have been considered prior to prescribing opioids, and the patient has been advised of the benefits and risks of the opioid (including the potential for addiction). Medication List        ASK your doctor about these medications      atenolol 100 MG tablet  Commonly known as: TENORMIN     atorvastatin 40 MG tablet  Commonly known as: LIPITOR  TAKE 1 TABLET EVERY DAY     dexamethasone 4 MG tablet  Commonly known as: DECADRON     folic acid 1 MG tablet  Commonly known as: FOLVITE  Take 1 tablet by mouth daily     levETIRAcetam 500 MG tablet  Commonly known as: Keppra  Take 2 tablets by mouth 2 times daily     ondansetron 4 MG disintegrating tablet  Commonly known as: ZOFRAN-ODT  Take 1 tablet by mouth every 8 hours as needed for Nausea or Vomiting     oxyCODONE-acetaminophen 5-325 MG per tablet  Commonly known as: PERCOCET     pantoprazole 40 MG tablet  Commonly known as: PROTONIX  Take 1 tablet by mouth every morning (before breakfast)              Discharge Destination:  The patient was discharged to Home. Follow-up:  The patient is to follow-up with Tony Tucker MD in the office in 2 weeks      Discharge Instructions:   Verbal and written discharge instructions were given to the patient at the time of discharge. Electronically signed by: JACQUIE Montes De Oca CNP, JACQUIE-CNP, 3/15/2023 7:30 AM  [phone]   I spent 45 minutes in the care of this patient.   Over 50% of that time was in face-to-face counseling regarding post op progression, pain management strategies, and answering patient and family questions

## 2023-03-15 NOTE — PROGRESS NOTES
Physician Progress Note      [facility]                  [id number]  :                       1965  ADMIT DATE:       3/13/2023 6:02 AM  DISCH DATE:  RESPONDING  PROVIDER #:        Trevor Diaz APRN - CNP          QUERY TEXT:    Pt admitted 3/13 for craniotomy with excision of radiation necrosis. Pt noted   to have left sided weakness, vasogenic edema and mass effect as Operative   indications. If clinically significant, please document in progress notes and   discharge summary if you are evaluating/treating any of the following: The medical record reflects the following:  Risk Factors: Rx Necrosis, brain CA  Clinical Indicators: 3/8- MRI: enhancing hemorrhagic lesion in left frontal   lobe with significant improvement in surrounding vasogenic edema; enhancing   lesion in left cerebellar superior vermis. Per Operative indications \"been   experiencing left sided weakness, especially in the left arm. MRI demonstrated   a right frontal mass with extensive surrounding vasogenic edema and mass   effect\". Postop CT 3/13: Adjacent vasogenic edema similar to the MRI exam.   Minimal mass effect. Treatment: Craniotomy, Decadron, Keppra  Options provided:  -- Cerebral edema  -- Brain compression  -- Cerebral edema and Brain compression  -- Other - I will add my own diagnosis  -- Disagree - Not applicable / Not valid  -- Disagree - Clinically unable to determine / Unknown  -- Refer to Clinical Documentation Reviewer    PROVIDER RESPONSE TEXT:    This patient has cerebral edema and brain compression. Query created by: Macey Diaz on 3/14/2023 1:26 PM      QUERY TEXT:    Pt admitted for craniotomy of noted radiation necrosis. Noted documentation   of hx of lung cancer w/ brain mets, s/p gamma knife .  imaging   showing mets to lymph nodes & bone.  If possible, please document in progress   notes and discharge summary please clarify current status of cancer w/ mets. The medical record reflects the following:  Risk Factors: lung CA w/ brain mets  Clinical Indicators: Jan '23 CT Chest & Abd: Stable residual nodule FILEMON;   Persistent metastatic lymphadenopathy in the left hilum and mediastinum; Stable small osteolytic metastasis in the right iliac wing. MRI Brain 3/8/23:   hemorrhagic lesion in left frontal lobe. Stable 3 mm enhancing lesion in left   cerebellar superior vermis. S/P Gamma Tile '24, no noted VATS/ wedge   resection. Treatment: Craniotomy w/ pathology, Post-op Imaging  Options provided:  -- Lung cancer with brain, mediastinal lymph node and bone metastasis   currently being treated/evaluated, Please document supportive evidence. -- Other - I will add my own diagnosis  -- Disagree - Not applicable / Not valid  -- Disagree - Clinically unable to determine / Unknown  -- Refer to Clinical Documentation Reviewer    PROVIDER RESPONSE TEXT:    Lung cancer with brain, mediastinal lymph node and bone metastasis is   currently being treated/evaluated as evidenced by    Query created by:  Dallas Lo on 3/14/2023 1:41 PM      Electronically signed by:  Bud Ponce CNP 3/15/2023 7:09 AM

## 2023-03-15 NOTE — PROGRESS NOTES
NEUROSURGERY POST-OP PROGRESS NOTE    Patient Name: Bozena Ponce YOB: 1965   Sex: Female Age: 59 yrs     Medical Record Number: [id number] Acct Number: [de-identified]   Room Number: 2281/9376-91 Hospital Day: Hospital Day: 3     Interval History:  Post-operative Day# 2 s/p Procedure(s) (LRB):  LEFT FRONTAL CRANIOTOMY FOR RESECTION OF BRAIN MASS (Left)    Subjective: Pt has no new c/o    Objective:    VITAL SIGNS   /73   Pulse 66   Temp 98 °F (36.7 °C) (Temporal)   Resp 13   Ht 5' 5\" (1.651 m)   Wt 173 lb 1 oz (78.5 kg)   SpO2 92%   BMI 28.80 kg/m²    Height Height: 5' 5\" (165.1 cm)   Weight Weight: 173 lb 1 oz (78.5 kg)        Allergies No Known Allergies   NPO Status ADULT DIET;  Regular   Isolation No active isolations     LABS   Basic Metabolic Profile Recent Labs     03/13/23  0703 03/14/23  0513 03/15/23  0518    134* 133*    98* 98*   CO2 24 25 24   BUN 32* 21* 27*   CREATININE 0.7 0.7 0.7   GLUCOSE 108* 123* 134*      Complete Blood Count Recent Labs     03/14/23  0513   WBC 9.5   RBC 3.93*      Coagulation Studies Recent Labs     03/13/23  0703 03/14/23  0513   INR 0.91 0.97        MEDICATIONS   Inpatient Medications     heparin (porcine), 5,000 Units, SubCUTAneous, Q8H    sodium chloride flush, 5-40 mL, IntraVENous, 2 times per day    atenolol, 100 mg, Oral, Daily    atorvastatin, 40 mg, Oral, Daily    levETIRAcetam, 1,000 mg, Oral, BID    pantoprazole, 40 mg, Oral, QAM AC    sodium chloride flush, 5-40 mL, IntraVENous, 2 times per day    acetaminophen, 650 mg, Oral, Q6H    dexamethasone, 4 mg, Oral, Q6H   Infusions    sodium chloride      sodium chloride        Antibiotics   Recent Abx Admin                     ceFAZolin (ANCEF) 2,000 mg in sodium chloride 0.9 % 50 mL IVPB (mini-bag) (mg) 2,000 mg New Bag 03/14/23 0730                     Neurologic Exam:  Mental status: awake and alert and oriented x4    Cranial Nerves:  II: Visual acuity not tested, visual fields full, denies new visual changes / diplopia  III, IV, VI: PERRL, 3 mm bilaterally, EOMI, no nystagmus noted  V: Facial sensation intact bilaterally to touch  VII: Face symmetric  VIII: Hearing intact bilaterally to spoken voice  IX: Palate movement equal bilaterally  XI: Shoulder shrug equal bilaterally  XII: Tongue midline      Musculoskeletal:   Gait: Not tested   Tone: normal  Sensory: intact to all extremities  Motor strength:    Right  Left    Right  Left    Deltoid  5 5  Hip Flex  5 5   Biceps  5 5  Knee Extensors  5 5   Triceps  5 5  Knee Flexors  5 5   Wrist Ext  5 5  Ankle Dorsiflex. 5 5   Wrist Flex  5 5  Ankle Plantarflex. 5 5   Handgrip  5 5  Ext Luis E Longus  5 5   Thumb Ext  5 5         Incision: intact, clean and dry      Respiratory:  Unlabored respiratory pattern    Abdomen:   Soft, ND       Cardiovascular:  Warm, well perfused    Assessment   Patient is a 60 yo F s/p Procedure(s) (LRB):  LEFT FRONTAL CRANIOTOMY FOR RESECTION OF BRAIN MASS (Left) per Dr. Che Soria . Pt educated from [employer] to Neurosurgery\" book pg , 25-27, 33-38. Plan:  Patient improved significantly post-operatively. Eating, voiding and ambulating well. Pain and nausea well controlled on oral medications  Surgical incision CDI without issues  Discharge home with family  All discharge instructions including incision care and follow up care gone over with patient and family and provided in written form   Patient was seen with Dr. Che Soria who agrees with above assessment and plan. Electronically signed by:  JACQUIE Fitzgerald CNP, 3/15/2023 7:22 AM   Neurosurgery Nurse Practitioner  [phone]

## 2023-03-15 NOTE — PROGRESS NOTES
03/15/23 1055   Encounter Summary   Encounter Overview/Reason  Initial Encounter;Spiritual/Emotional Needs   Service Provided For: Patient   Referral/Consult From: Rounding   Support System Spouse; Family members   Last Encounter  03/15/23  (Support, prayer. [facility])   Complexity of Encounter Moderate   Begin Time 1044   End Time  1056   Total Time Calculated 12 min   Encounter    Type Initial Screen/Assessment   Spiritual/Emotional needs   Type Spiritual Support   Assessment/Intervention/Outcome   Assessment Calm;Coping; Hopeful;Tearful   Intervention Active listening;Discussed belief system/Oriental orthodox practices/ken;Discussed illness injury and its impact; Explored/Affirmed feelings, thoughts, concerns;Prayer (assurance of)/[place];Sustaining Presence/Ministry of presence   Outcome Connection/Belonging;Coping;Engaged in conversation;Expressed feelings, needs, and concerns;Expressed Gratitude;Receptive      provided listening, support, and prayer for patient, who states that her ken in God is helping her cope with illness. Patient shared that she hopes to have as much time as possible to continue living, and she intends to embrace every moment of the time she has left.

## 2023-03-15 NOTE — PROGRESS NOTES
Discharge instructions reviewed with patient. No questions at this time. All peripheral IV's removed. All patient belongings removed from room with patient. Pt ambulated off unit, RN reviewed safety policy prior to leaving unit.

## 2023-03-24 NOTE — PROGRESS NOTES
Physician Progress Note      PATIENTShyheather Laguna  [facility] #:                  [id number]  :                       1965  ADMIT DATE:       3/13/2023 6:02 AM  DISCH DATE:        3/15/2023 11:20 AM  RESPONDING  PROVIDER #:        Josué Ponce CNP          QUERY TEXT:    Pt admitted 3/13- 3/15 with Left frontal mass w/ cerebral edema, s/p excision   3/13, and noted to have surgical pathology consistent with radiation necrosis. 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 Per Op note \"Left frontal mass for frozen; Tumor was then   sent for both frozen and permanent histopathology with confirmation of a   necrosis\". Preop MRI 3/8: slight decrease in size of heterogeneously enhancing   hemorrhagic lesion in left frontal lobe with significant improvement in   surrounding vasogenic edema. Stable 3 mm enhancing lesion in left cerebellar   superior vermis. 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 Query created by:  Sydnee Jarrell on 3/22/2023 10:44 AM      Electronically signed by:  Josué Ponce CNP 3/24/2023 8:40 AM

## 2023-06-19 ENCOUNTER — HOSPITAL ENCOUNTER (OUTPATIENT)
Dept: CT IMAGING | Age: 58
Discharge: HOME | End: 2023-06-19
Payer: COMMERCIAL

## 2023-06-19 DIAGNOSIS — C34.12 MALIGNANT NEOPLASM OF UPPER LOBE OF LEFT LUNG (HCC): ICD-10-CM

## 2023-06-19 LAB
CREAT SERPL-MCNC: 0.8 MG/DL (ref 0.6–1.1)
GFR SERPLBLD CREATININE-BSD FMLA CKD-EPI: >60 ML/MIN/{1.73_M2}

## 2023-06-19 PROCEDURE — 6360000004 HC RX CONTRAST MEDICATION: Performed by: NURSE PRACTITIONER

## 2023-06-19 PROCEDURE — 36415 COLL VENOUS BLD VENIPUNCTURE: CPT

## 2023-06-19 PROCEDURE — 82565 ASSAY OF CREATININE: CPT

## 2023-06-19 PROCEDURE — 74177 CT ABD & PELVIS W/CONTRAST: CPT

## 2023-06-19 RX ADMIN — IOPAMIDOL 75 ML: 755 INJECTION, SOLUTION INTRAVENOUS at 12:51

## 2023-06-19 RX ADMIN — IOHEXOL 50 ML: 240 INJECTION, SOLUTION INTRATHECAL; INTRAVASCULAR; INTRAVENOUS; ORAL at 12:51

## 2023-06-23 ENCOUNTER — HOSPITAL ENCOUNTER (OUTPATIENT)
Dept: MRI IMAGING | Age: 58
Discharge: HOME | End: 2023-06-23
Attending: NEUROLOGICAL SURGERY
Payer: COMMERCIAL

## 2023-06-23 DIAGNOSIS — C79.49 SECONDARY MALIGNANT NEOPLASM OF BRAIN AND SPINAL CORD (HCC): ICD-10-CM

## 2023-06-23 DIAGNOSIS — C79.31 SECONDARY MALIGNANT NEOPLASM OF BRAIN AND SPINAL CORD (HCC): ICD-10-CM

## 2023-06-23 PROCEDURE — A9577 INJ MULTIHANCE: HCPCS | Performed by: NEUROLOGICAL SURGERY

## 2023-06-23 PROCEDURE — 6360000004 HC RX CONTRAST MEDICATION: Performed by: NEUROLOGICAL SURGERY

## 2023-06-23 PROCEDURE — 70553 MRI BRAIN STEM W/O & W/DYE: CPT

## 2023-06-23 RX ADMIN — GADOBENATE DIMEGLUMINE 16 ML: 529 INJECTION, SOLUTION INTRAVENOUS at 12:33

## 2023-07-21 ENCOUNTER — HOSPITAL ENCOUNTER (OUTPATIENT)
Dept: CT IMAGING | Age: 58
Discharge: HOME | End: 2023-07-21
Attending: INTERNAL MEDICINE

## 2023-07-21 DIAGNOSIS — C34.90 MALIGNANT NEOPLASM OF LUNG, UNSPECIFIED LATERALITY, UNSPECIFIED PART OF LUNG (HCC): ICD-10-CM

## 2023-10-13 ENCOUNTER — HOSPITAL ENCOUNTER (INPATIENT)
Age: 58
LOS: 3 days | Discharge: HOSPICE | DRG: 180 | End: 2023-10-16
Attending: EMERGENCY MEDICINE | Admitting: INTERNAL MEDICINE
Payer: COMMERCIAL

## 2023-10-13 ENCOUNTER — APPOINTMENT (OUTPATIENT)
Dept: GENERAL RADIOLOGY | Age: 58
DRG: 180 | End: 2023-10-13
Payer: COMMERCIAL

## 2023-10-13 ENCOUNTER — APPOINTMENT (OUTPATIENT)
Dept: CT IMAGING | Age: 58
DRG: 180 | End: 2023-10-13
Payer: COMMERCIAL

## 2023-10-13 DIAGNOSIS — J90 PLEURAL EFFUSION, BILATERAL: ICD-10-CM

## 2023-10-13 DIAGNOSIS — J90 PLEURAL EFFUSION, LEFT: ICD-10-CM

## 2023-10-13 DIAGNOSIS — Z98.890 STATUS POST THORACENTESIS: ICD-10-CM

## 2023-10-13 DIAGNOSIS — I95.9 HYPOTENSION, UNSPECIFIED HYPOTENSION TYPE: ICD-10-CM

## 2023-10-13 DIAGNOSIS — C34.90 PRIMARY MALIGNANT NEOPLASM OF LUNG METASTATIC TO OTHER SITE, UNSPECIFIED LATERALITY (HCC): ICD-10-CM

## 2023-10-13 DIAGNOSIS — E87.6 HYPOKALEMIA: ICD-10-CM

## 2023-10-13 DIAGNOSIS — J96.01 ACUTE RESPIRATORY FAILURE WITH HYPOXIA (HCC): Primary | ICD-10-CM

## 2023-10-13 PROBLEM — G89.3 CANCER ASSOCIATED PAIN: Status: ACTIVE | Noted: 2023-10-13

## 2023-10-13 PROBLEM — R06.02 SOB (SHORTNESS OF BREATH): Status: ACTIVE | Noted: 2023-10-13

## 2023-10-13 LAB
ALBUMIN SERPL-MCNC: 3.1 G/DL (ref 3.4–5)
ALBUMIN/GLOB SERPL: 1 {RATIO} (ref 1.1–2.2)
ALP SERPL-CCNC: 100 U/L (ref 40–129)
ALT SERPL-CCNC: 7 U/L (ref 10–40)
ANION GAP SERPL CALCULATED.3IONS-SCNC: 15 MMOL/L (ref 3–16)
AST SERPL-CCNC: 16 U/L (ref 15–37)
BASE EXCESS BLDV CALC-SCNC: 0.6 MMOL/L (ref -3–3)
BILIRUB SERPL-MCNC: 1.1 MG/DL (ref 0–1)
BUN SERPL-MCNC: 10 MG/DL (ref 7–20)
CALCIUM SERPL-MCNC: 8.7 MG/DL (ref 8.3–10.6)
CHLORIDE SERPL-SCNC: 96 MMOL/L (ref 99–110)
CO2 BLDV-SCNC: 56 MMOL/L
CO2 SERPL-SCNC: 22 MMOL/L (ref 21–32)
COHGB MFR BLDV: 7.4 % (ref 0–1.5)
CREAT SERPL-MCNC: 0.7 MG/DL (ref 0.6–1.2)
FLUAV RNA UPPER RESP QL NAA+PROBE: NEGATIVE
FLUBV AG NPH QL: NEGATIVE
GFR SERPLBLD CREATININE-BSD FMLA CKD-EPI: >60 ML/MIN/{1.73_M2}
GLUCOSE SERPL-MCNC: 106 MG/DL (ref 70–99)
HCO3 BLDV-SCNC: 23.8 MMOL/L (ref 23–29)
LACTATE BLDV-SCNC: 1.2 MMOL/L (ref 0.4–1.9)
METHGB MFR BLDV: 0.2 %
NT-PROBNP SERPL-MCNC: 1837 PG/ML (ref 0–124)
O2 CT VFR BLDV CALC: 14 VOL %
O2 THERAPY: ABNORMAL
PCO2 BLDV: 32.6 MMHG (ref 40–50)
PH BLDV: 7.47 [PH] (ref 7.35–7.45)
PO2 BLDV: 128 MMHG (ref 25–40)
POTASSIUM SERPL-SCNC: 2.9 MMOL/L (ref 3.5–5.1)
PROCALCITONIN SERPL IA-MCNC: 0.49 NG/ML (ref 0–0.15)
PROT SERPL-MCNC: 6.2 G/DL (ref 6.4–8.2)
SAO2 % BLDV: 100 %
SARS-COV-2 RDRP RESP QL NAA+PROBE: NOT DETECTED
SODIUM SERPL-SCNC: 133 MMOL/L (ref 136–145)
TROPONIN, HIGH SENSITIVITY: 26 NG/L (ref 0–14)

## 2023-10-13 PROCEDURE — 85025 COMPLETE CBC W/AUTO DIFF WBC: CPT

## 2023-10-13 PROCEDURE — 2580000003 HC RX 258: Performed by: PHYSICIAN ASSISTANT

## 2023-10-13 PROCEDURE — 71260 CT THORAX DX C+: CPT

## 2023-10-13 PROCEDURE — 36415 COLL VENOUS BLD VENIPUNCTURE: CPT

## 2023-10-13 PROCEDURE — 6360000002 HC RX W HCPCS: Performed by: EMERGENCY MEDICINE

## 2023-10-13 PROCEDURE — 94640 AIRWAY INHALATION TREATMENT: CPT

## 2023-10-13 PROCEDURE — 6360000002 HC RX W HCPCS: Performed by: PHYSICIAN ASSISTANT

## 2023-10-13 PROCEDURE — 83605 ASSAY OF LACTIC ACID: CPT

## 2023-10-13 PROCEDURE — 83880 ASSAY OF NATRIURETIC PEPTIDE: CPT

## 2023-10-13 PROCEDURE — 74018 RADEX ABDOMEN 1 VIEW: CPT

## 2023-10-13 PROCEDURE — 94761 N-INVAS EAR/PLS OXIMETRY MLT: CPT

## 2023-10-13 PROCEDURE — 2700000000 HC OXYGEN THERAPY PER DAY

## 2023-10-13 PROCEDURE — 6370000000 HC RX 637 (ALT 250 FOR IP): Performed by: EMERGENCY MEDICINE

## 2023-10-13 PROCEDURE — 96374 THER/PROPH/DIAG INJ IV PUSH: CPT

## 2023-10-13 PROCEDURE — 99285 EMERGENCY DEPT VISIT HI MDM: CPT

## 2023-10-13 PROCEDURE — 80053 COMPREHEN METABOLIC PANEL: CPT

## 2023-10-13 PROCEDURE — 2580000003 HC RX 258: Performed by: INTERNAL MEDICINE

## 2023-10-13 PROCEDURE — 87804 INFLUENZA ASSAY W/OPTIC: CPT

## 2023-10-13 PROCEDURE — 87635 SARS-COV-2 COVID-19 AMP PRB: CPT

## 2023-10-13 PROCEDURE — 84145 PROCALCITONIN (PCT): CPT

## 2023-10-13 PROCEDURE — 6370000000 HC RX 637 (ALT 250 FOR IP): Performed by: INTERNAL MEDICINE

## 2023-10-13 PROCEDURE — 87040 BLOOD CULTURE FOR BACTERIA: CPT

## 2023-10-13 PROCEDURE — 82803 BLOOD GASES ANY COMBINATION: CPT

## 2023-10-13 PROCEDURE — 96375 TX/PRO/DX INJ NEW DRUG ADDON: CPT

## 2023-10-13 PROCEDURE — 6360000004 HC RX CONTRAST MEDICATION: Performed by: PHYSICIAN ASSISTANT

## 2023-10-13 PROCEDURE — 2580000003 HC RX 258: Performed by: EMERGENCY MEDICINE

## 2023-10-13 PROCEDURE — 71045 X-RAY EXAM CHEST 1 VIEW: CPT

## 2023-10-13 PROCEDURE — 84484 ASSAY OF TROPONIN QUANT: CPT

## 2023-10-13 PROCEDURE — 1200000000 HC SEMI PRIVATE

## 2023-10-13 PROCEDURE — 93005 ELECTROCARDIOGRAM TRACING: CPT | Performed by: PHYSICIAN ASSISTANT

## 2023-10-13 RX ORDER — LEVETIRACETAM 500 MG/1
1000 TABLET ORAL 2 TIMES DAILY
Status: DISCONTINUED | OUTPATIENT
Start: 2023-10-13 | End: 2023-10-16 | Stop reason: HOSPADM

## 2023-10-13 RX ORDER — SODIUM CHLORIDE 0.9 % (FLUSH) 0.9 %
5-40 SYRINGE (ML) INJECTION PRN
Status: DISCONTINUED | OUTPATIENT
Start: 2023-10-13 | End: 2023-10-16 | Stop reason: HOSPADM

## 2023-10-13 RX ORDER — FUROSEMIDE 10 MG/ML
40 INJECTION INTRAMUSCULAR; INTRAVENOUS 2 TIMES DAILY
Status: DISCONTINUED | OUTPATIENT
Start: 2023-10-14 | End: 2023-10-15

## 2023-10-13 RX ORDER — IPRATROPIUM BROMIDE AND ALBUTEROL SULFATE 2.5; .5 MG/3ML; MG/3ML
1 SOLUTION RESPIRATORY (INHALATION) ONCE
Status: COMPLETED | OUTPATIENT
Start: 2023-10-13 | End: 2023-10-13

## 2023-10-13 RX ORDER — OXYCODONE HCL 10 MG/1
10 TABLET, FILM COATED, EXTENDED RELEASE ORAL
Status: ON HOLD | COMMUNITY
End: 2023-10-15 | Stop reason: HOSPADM

## 2023-10-13 RX ORDER — ATENOLOL 50 MG/1
50 TABLET ORAL DAILY
Status: DISCONTINUED | OUTPATIENT
Start: 2023-10-13 | End: 2023-10-14

## 2023-10-13 RX ORDER — POTASSIUM CHLORIDE 7.45 MG/ML
10 INJECTION INTRAVENOUS
Status: DISPENSED | OUTPATIENT
Start: 2023-10-13 | End: 2023-10-13

## 2023-10-13 RX ORDER — PANTOPRAZOLE SODIUM 40 MG/1
40 TABLET, DELAYED RELEASE ORAL
Status: DISCONTINUED | OUTPATIENT
Start: 2023-10-14 | End: 2023-10-16 | Stop reason: HOSPADM

## 2023-10-13 RX ORDER — ONDANSETRON 4 MG/1
4 TABLET, ORALLY DISINTEGRATING ORAL EVERY 8 HOURS PRN
Status: DISCONTINUED | OUTPATIENT
Start: 2023-10-13 | End: 2023-10-16 | Stop reason: HOSPADM

## 2023-10-13 RX ORDER — MORPHINE SULFATE 4 MG/ML
4 INJECTION, SOLUTION INTRAMUSCULAR; INTRAVENOUS
Status: DISCONTINUED | OUTPATIENT
Start: 2023-10-13 | End: 2023-10-14

## 2023-10-13 RX ORDER — ACETAMINOPHEN 325 MG/1
650 TABLET ORAL EVERY 6 HOURS PRN
Status: DISCONTINUED | OUTPATIENT
Start: 2023-10-13 | End: 2023-10-16 | Stop reason: HOSPADM

## 2023-10-13 RX ORDER — SODIUM CHLORIDE 9 MG/ML
INJECTION, SOLUTION INTRAVENOUS PRN
Status: DISCONTINUED | OUTPATIENT
Start: 2023-10-13 | End: 2023-10-16 | Stop reason: HOSPADM

## 2023-10-13 RX ORDER — POLYETHYLENE GLYCOL 3350 17 G/17G
17 POWDER, FOR SOLUTION ORAL DAILY PRN
Status: DISCONTINUED | OUTPATIENT
Start: 2023-10-13 | End: 2023-10-16 | Stop reason: HOSPADM

## 2023-10-13 RX ORDER — ONDANSETRON 2 MG/ML
4 INJECTION INTRAMUSCULAR; INTRAVENOUS EVERY 6 HOURS PRN
Status: DISCONTINUED | OUTPATIENT
Start: 2023-10-13 | End: 2023-10-16 | Stop reason: HOSPADM

## 2023-10-13 RX ORDER — FENTANYL CITRATE 50 UG/ML
25 INJECTION, SOLUTION INTRAMUSCULAR; INTRAVENOUS ONCE
Status: COMPLETED | OUTPATIENT
Start: 2023-10-13 | End: 2023-10-13

## 2023-10-13 RX ORDER — ACETAMINOPHEN 650 MG/1
650 SUPPOSITORY RECTAL EVERY 6 HOURS PRN
Status: DISCONTINUED | OUTPATIENT
Start: 2023-10-13 | End: 2023-10-16 | Stop reason: HOSPADM

## 2023-10-13 RX ORDER — ENOXAPARIN SODIUM 100 MG/ML
40 INJECTION SUBCUTANEOUS DAILY
Status: DISCONTINUED | OUTPATIENT
Start: 2023-10-14 | End: 2023-10-16 | Stop reason: HOSPADM

## 2023-10-13 RX ORDER — ATORVASTATIN CALCIUM 40 MG/1
40 TABLET, FILM COATED ORAL NIGHTLY
Status: DISCONTINUED | OUTPATIENT
Start: 2023-10-13 | End: 2023-10-13 | Stop reason: ALTCHOICE

## 2023-10-13 RX ORDER — SODIUM CHLORIDE 0.9 % (FLUSH) 0.9 %
5-40 SYRINGE (ML) INJECTION EVERY 12 HOURS SCHEDULED
Status: DISCONTINUED | OUTPATIENT
Start: 2023-10-13 | End: 2023-10-16 | Stop reason: HOSPADM

## 2023-10-13 RX ORDER — 0.9 % SODIUM CHLORIDE 0.9 %
1000 INTRAVENOUS SOLUTION INTRAVENOUS ONCE
Status: COMPLETED | OUTPATIENT
Start: 2023-10-13 | End: 2023-10-13

## 2023-10-13 RX ADMIN — PIPERACILLIN AND TAZOBACTAM 3375 MG: 3; .375 INJECTION, POWDER, LYOPHILIZED, FOR SOLUTION INTRAVENOUS at 19:38

## 2023-10-13 RX ADMIN — IPRATROPIUM BROMIDE AND ALBUTEROL SULFATE 1 DOSE: .5; 3 SOLUTION RESPIRATORY (INHALATION) at 19:37

## 2023-10-13 RX ADMIN — POTASSIUM CHLORIDE 10 MEQ: 7.46 INJECTION, SOLUTION INTRAVENOUS at 20:36

## 2023-10-13 RX ADMIN — POTASSIUM CHLORIDE 10 MEQ: 7.46 INJECTION, SOLUTION INTRAVENOUS at 18:07

## 2023-10-13 RX ADMIN — LEVETIRACETAM 1000 MG: 500 TABLET, FILM COATED ORAL at 23:03

## 2023-10-13 RX ADMIN — SODIUM CHLORIDE 1000 ML: 9 INJECTION, SOLUTION INTRAVENOUS at 16:00

## 2023-10-13 RX ADMIN — SODIUM CHLORIDE, PRESERVATIVE FREE 10 ML: 5 INJECTION INTRAVENOUS at 23:24

## 2023-10-13 RX ADMIN — FENTANYL CITRATE 25 MCG: 50 INJECTION INTRAMUSCULAR; INTRAVENOUS at 16:01

## 2023-10-13 RX ADMIN — IOPAMIDOL 75 ML: 755 INJECTION, SOLUTION INTRAVENOUS at 17:46

## 2023-10-13 RX ADMIN — POTASSIUM CHLORIDE 10 MEQ: 7.46 INJECTION, SOLUTION INTRAVENOUS at 19:37

## 2023-10-13 RX ADMIN — POTASSIUM CHLORIDE 10 MEQ: 7.46 INJECTION, SOLUTION INTRAVENOUS at 22:04

## 2023-10-13 RX ADMIN — POLYETHYLENE GLYCOL 3350 17 G: 17 POWDER, FOR SOLUTION ORAL at 23:02

## 2023-10-13 ASSESSMENT — ENCOUNTER SYMPTOMS
SHORTNESS OF BREATH: 1
RHINORRHEA: 0
COUGH: 0
VOMITING: 0
DIARRHEA: 0
NAUSEA: 1
WHEEZING: 0
ABDOMINAL PAIN: 0

## 2023-10-13 ASSESSMENT — PAIN SCALES - GENERAL: PAINLEVEL_OUTOF10: 0

## 2023-10-13 NOTE — ACP (ADVANCE CARE PLANNING)
Advanced Care Planning Note. Purpose of Encounter: Advanced care planning in light of metastatic lung adenocarcinoma  Parties In Attendance: Patient, , sister, brother  Decisional Capacity: Yes  Subjective: Patient is SOB and weak  Objective: Cr 0.7  Goals of Care Determination: Patient wants limited support (No CPR, no vent, no surgery, no HD, no trach, no PEG, no SNF)  Plan:  MRI Brain, AXR, IV Morphine, IV Lasix, Pulm and Onc consults, PT/OT  Code Status: Limited   Time spent on Advanced care Plannin minutes  Advanced Care Planning Documents: Completed advanced directives on chart,  is the POA.     Peg Michelle MD  10/13/2023 7:36 PM

## 2023-10-13 NOTE — ED PROVIDER NOTES
In addition to the advanced practice provider, I personally saw Cammy Zazueta and performed a substantive portion of the visit including all aspects of the medical decision making. Briefly, this is a 59 y.o. female here for generalized weakness and shortness of breath. Patient with history of metastatic non-small cell lung cancer, metastases involve the brain as well. She is undergoing chemotherapy and more recently radiation therapy. She has had progressively worsening shortness of breath over the past several days. On arrival to the emergency department she is hypoxic, typically does not require supplemental oxygen at baseline. She denies any fevers, chills, chest pain or cough. No known sick contacts. .    On exam, patient afebrile and nontoxic. No distress. Heart tachycardic, regular rhythm. Lungs diminished at bases with scattered rhonchi. Abdomen soft, nondistended, nontender to palpation in all quadrants. A&Ox4, although thought processes often tangential. Speech clear, face symmetric. CN 2-12 intact. 5/5 motor and sensation grossly intact all extremities. No pronator drift. Gait not tested. EKG  EKG was reviewed by emergency department physician in the absence of a cardiologist    Narrow complex sinus rhythm, rate 105, rightward axis, normal MA and QRS intervals, normal Qtc, no ST elevations or depressions, TWI V3, impression sinus tachycardia with nonspecific T wave morphology, no STEMI          MDM    Patient afebrile, ill-appearing however nontoxic. At time of my evaluation she is in no acute painful or respiratory distress. She is alert and protecting her airway. Her initial hypoxia was corrected with supplemental oxygen by nasal cannula. EKG no STEMI, high-sensitivity troponin minimally elevated, no chest pain, suspect troponin elevation due to hypoxia and demand, lower suspicion for ACS. No malignant dysrhythmia.   CTA chest without pulmonary embolism, however does reveal interval progression of malignancy as well as new pleural effusions bilaterally which are likely etiology of hypoxia. Procalcitonin equivocal, given severity of illness will give one-time dose of Zosyn for potential bacterial pneumonia, although my clinical suspicion for infection is somewhat low. No indication for 30 cc/kg aggressive IV fluid resuscitation and given patient's pleural effusions this is likely to worsen her respiratory status. Case discussed with internal medicine team and will plan for admission for further evaluation and care. Patient alert, hemodynamically stable and in no distress at time of admission. I Dr. Frances Perez am the primary clinician of record. Critical Care:    I have discussed the case with the advanced practice provider. I have personally performed a history, physical exam, and my own medical decision making. I have reviewed the note and agree with the findings and plan. Upon my evaluation, this patient had a high probability of imminent or life-threatening deterioration due to acute hypoxic respiratory failure, hypotension which required my direct attention, intervention, and personal management. I personally saw the patient and independently provided 32 minutes of non-concurrent critical care out of the total shared critical care time provided. The critical care time spent while evaluating and treating this patient was exclusive of any time spent doing separately billable procedures. This critical care time includes time at the bedside, data interpretation, medication management, monitoring for potential decompensation and physician consultation. Specifics of interventions taken and potentially life-threatening diagnostic considerations are listed above in the medical decision making. Patient Referrals:  No follow-up provider specified. Discharge Medications:  New Prescriptions    No medications on file       FINAL IMPRESSION  1.  Acute respiratory failure with hypoxia

## 2023-10-13 NOTE — ED PROVIDER NOTES
[facility]        Pt Name: Chucky Mcnally  MRN: [id number]  [address] [place] 1965  Date of evaluation: 10/13/2023  Provider: Andrea Kimball PA-C  PCP: Lara Chicas MD  Note Started: 2:57 PM EDT 10/13/23       I have seen and evaluated this patient with my supervising physician Case Lewis DO.      CHIEF COMPLAINT       Chief Complaint   Patient presents with    Shortness of Breath     Pt via family from home, c/o sob and fatigue, endorses dizziness, hx of lung cancer, metastasized to numerous areas, pt states she has a DNR signed       HISTORY OF PRESENT ILLNESS: 1 or more Elements     History From: patient   Limitations to history : None    Chucky Mcnally is a 59 y.o. female with history of metastatic lung cancer who presents for evaluation of increasing shortness of breath. Patient states that she has no prior oxygen requirements. She is currently on chemotherapy but states that she missed her past 2 sessions, 10/5 and 10/12. She reports increasing body pain and states oxycodone is not working for this. Also complaining of decreased appetite and p.o. intake. Feels very weak, fatigued and concern for dehydration. She denies chest pain. No abdominal pain. She does have nausea. No vomiting or diarrhea. No urinary symptoms. She has no other complaints or concerns at this time. Nursing Notes were all reviewed and agreed with or any disagreements were addressed in the HPI. REVIEW OF SYSTEMS :      Review of Systems   Constitutional:  Positive for appetite change and unexpected weight change. Negative for chills and fever. HENT:  Negative for congestion and rhinorrhea. Respiratory:  Positive for shortness of breath. Negative for cough and wheezing. Cardiovascular:  Negative for chest pain. Gastrointestinal:  Positive for nausea. Negative for abdominal pain, diarrhea and vomiting.    Genitourinary:  Negative for difficulty urinating, dysuria and hematuria. Musculoskeletal:  Positive for arthralgias and myalgias. Negative for neck pain and neck stiffness. Skin:  Negative for rash. Neurological:  Negative for headaches. Positives and Pertinent negatives as per HPI. SURGICAL HISTORY     Past Surgical History:   Procedure Laterality Date    ANKLE SURGERY Left     BRONCHOSCOPY N/A 2022    BRONCHOSCOPY BIOPSY BRONCHUS performed by Alexa Shepard MD at [address] [facility] Left 3/13/2023    LEFT FRONTAL CRANIOTOMY FOR RESECTION OF BRAIN MASS performed by Lexus Denise. Suraj Owens MD at [address] [facility] Left 2022    PORT A CATHETER PLACEMENT performed by Zack Zepeda MD at [address] [facility]       Previous Medications    ATENOLOL (TENORMIN) 100 MG TABLET    TK 1 T PO D    ATORVASTATIN (LIPITOR) 40 MG TABLET    TAKE 1 TABLET EVERY DAY    LEVETIRACETAM (KEPPRA) 500 MG TABLET    Take 2 tablets by mouth 2 times daily    ONDANSETRON (ZOFRAN-ODT) 4 MG DISINTEGRATING TABLET    Take 1 tablet by mouth every 8 hours as needed for Nausea or Vomiting    PANTOPRAZOLE (PROTONIX) 40 MG TABLET    Take 1 tablet by mouth every morning (before breakfast)       ALLERGIES     Patient has no known allergies.     FAMILYHISTORY       Family History   Problem Relation Age of Onset    Stroke Mother     Cancer Father         SOCIAL HISTORY       Social History     Tobacco Use    Smoking status: Former     Years: 30     Types: Cigarettes     Quit date:      Years since quittin.7    Smokeless tobacco: Never    Tobacco comments:     pt states she has chantix at home hasnt started yet   Substance Use Topics    Alcohol use: Not Currently    Drug use: No       SCREENINGS                         CIWA Assessment  BP: 93/75  Pulse: (!) 110           PHYSICAL EXAM  1 or more Elements     ED Triage Vitals [10/13/23 1427]   BP Temp Temp Source Pulse Respirations SpO2 Height

## 2023-10-13 NOTE — H&P
HOSPITALISTS HISTORY AND PHYSICAL    10/13/2023 7:36 PM    Patient Information:  Marco Britton is a 59 y.o. female [id number]  PCP:  Salo Mondragon MD (Tel: [phone] )    Chief complaint:    Chief Complaint   Patient presents with    Shortness of Breath     Pt via family from home, c/o sob and fatigue, endorses dizziness, hx of lung cancer, metastasized to numerous areas, pt states she has a DNR signed        History of Present Illness:  Marco Britton is a 59 y.o. female with history of metastatic lung adenocarcinoma to brain and liver, seizure, HTN, hyperlipidemia, neoplastic pain who came to ER with complaints of SOB, dizziness and weakness. Patient has recently been changed from Oxycodone to Fentanyl patches. Patient has been complaining of alternating constipation and diarrhea. No CP, HA or fevers. Patient has no appetite.  states patient missed appointment with Oncology on 10/5 and yesterday due to weakness and confusion. Asked to come to ED for further evaluation. Otherwise complete ROS is negative unless listed above. REVIEW OF SYSTEMS:   Pertinent positives as noted in HPI. All other systems were reviewed and are negative. Past Medical History:   has a past medical history of Balance disorder, Brain cancer ([address]), Dizziness, Hyperlipidemia, Hypertension, ICH (intracerebral hemorrhage), Liver cancer ([address]), Malignant neoplasm of upper lobe of left lung ([address]), Seizure ([address]), and Vitamin D deficiency. Past Surgical History:   has a past surgical history that includes Ankle surgery (Left); Colonoscopy; bronchoscopy (N/A, 5/12/2022); Port Surgery (Left, 5/23/2022); and craniotomy (Left, 3/13/2023). Medications:  No current facility-administered medications on file prior to encounter.      Current Outpatient Medications on File Prior to Encounter   Medication Sig Dispense

## 2023-10-14 ENCOUNTER — APPOINTMENT (OUTPATIENT)
Dept: MRI IMAGING | Age: 58
DRG: 180 | End: 2023-10-14
Payer: COMMERCIAL

## 2023-10-14 LAB
ANION GAP SERPL CALCULATED.3IONS-SCNC: 14 MMOL/L (ref 3–16)
ANISOCYTOSIS BLD QL SMEAR: ABNORMAL
BASOPHILS # BLD: 0 K/UL (ref 0–0.2)
BASOPHILS # BLD: 0 K/UL (ref 0–0.2)
BASOPHILS NFR BLD: 0 %
BASOPHILS NFR BLD: 0.3 %
BUN SERPL-MCNC: 8 MG/DL (ref 7–20)
CALCIUM SERPL-MCNC: 8.8 MG/DL (ref 8.3–10.6)
CHLORIDE SERPL-SCNC: 99 MMOL/L (ref 99–110)
CO2 SERPL-SCNC: 22 MMOL/L (ref 21–32)
CREAT SERPL-MCNC: 0.6 MG/DL (ref 0.6–1.2)
DACRYOCYTES BLD QL SMEAR: ABNORMAL
DEPRECATED RDW RBC AUTO: 24.5 % (ref 12.4–15.4)
DEPRECATED RDW RBC AUTO: 24.9 % (ref 12.4–15.4)
EKG ATRIAL RATE: 105 BPM
EKG DIAGNOSIS: NORMAL
EKG P AXIS: 78 DEGREES
EKG P-R INTERVAL: 156 MS
EKG Q-T INTERVAL: 374 MS
EKG QRS DURATION: 74 MS
EKG QTC CALCULATION (BAZETT): 494 MS
EKG R AXIS: 109 DEGREES
EKG T AXIS: 78 DEGREES
EKG VENTRICULAR RATE: 105 BPM
EOSINOPHIL # BLD: 0 K/UL (ref 0–0.6)
EOSINOPHIL # BLD: 0.1 K/UL (ref 0–0.6)
EOSINOPHIL NFR BLD: 0.1 %
EOSINOPHIL NFR BLD: 1 %
GFR SERPLBLD CREATININE-BSD FMLA CKD-EPI: >60 ML/MIN/{1.73_M2}
GLUCOSE SERPL-MCNC: 109 MG/DL (ref 70–99)
HCT VFR BLD AUTO: 30.8 % (ref 36–48)
HCT VFR BLD AUTO: 31.8 % (ref 36–48)
HGB BLD-MCNC: 9.5 G/DL (ref 12–16)
HGB BLD-MCNC: 9.8 G/DL (ref 12–16)
LYMPHOCYTES # BLD: 0.5 K/UL (ref 1–5.1)
LYMPHOCYTES # BLD: 0.8 K/UL (ref 1–5.1)
LYMPHOCYTES NFR BLD: 5 %
LYMPHOCYTES NFR BLD: 8.4 %
MACROCYTES BLD QL SMEAR: ABNORMAL
MCH RBC QN AUTO: 29.9 PG (ref 26–34)
MCH RBC QN AUTO: 30.1 PG (ref 26–34)
MCHC RBC AUTO-ENTMCNC: 30.8 G/DL (ref 31–36)
MCHC RBC AUTO-ENTMCNC: 31 G/DL (ref 31–36)
MCV RBC AUTO: 96.6 FL (ref 80–100)
MCV RBC AUTO: 97.7 FL (ref 80–100)
METAMYELOCYTES NFR BLD MANUAL: 1 %
MONOCYTES # BLD: 1.5 K/UL (ref 0–1.3)
MONOCYTES # BLD: 1.9 K/UL (ref 0–1.3)
MONOCYTES NFR BLD: 15.8 %
MONOCYTES NFR BLD: 20 %
NEUTROPHILS # BLD: 6.9 K/UL (ref 1.7–7.7)
NEUTROPHILS # BLD: 7.2 K/UL (ref 1.7–7.7)
NEUTROPHILS NFR BLD: 72 %
NEUTROPHILS NFR BLD: 75.4 %
NRBC BLD-RTO: 5 /100 WBC
OVALOCYTES BLD QL SMEAR: ABNORMAL
PATH INTERP BLD-IMP: YES
PLATELET # BLD AUTO: 238 K/UL (ref 135–450)
PLATELET # BLD AUTO: 268 K/UL (ref 135–450)
PLATELET BLD QL SMEAR: ADEQUATE
PMV BLD AUTO: 7.6 FL (ref 5–10.5)
PMV BLD AUTO: 8 FL (ref 5–10.5)
POLYCHROMASIA BLD QL SMEAR: ABNORMAL
POTASSIUM SERPL-SCNC: 3.6 MMOL/L (ref 3.5–5.1)
PROMYELOCYTES NFR BLD MANUAL: 1 %
RBC # BLD AUTO: 3.19 M/UL (ref 4–5.2)
RBC # BLD AUTO: 3.25 M/UL (ref 4–5.2)
SLIDE REVIEW: ABNORMAL
SODIUM SERPL-SCNC: 135 MMOL/L (ref 136–145)
TARGETS BLD QL SMEAR: ABNORMAL
WBC # BLD AUTO: 9.3 K/UL (ref 4–11)
WBC # BLD AUTO: 9.5 K/UL (ref 4–11)

## 2023-10-14 PROCEDURE — 1200000000 HC SEMI PRIVATE

## 2023-10-14 PROCEDURE — 6370000000 HC RX 637 (ALT 250 FOR IP): Performed by: NURSE PRACTITIONER

## 2023-10-14 PROCEDURE — 93010 ELECTROCARDIOGRAM REPORT: CPT | Performed by: INTERNAL MEDICINE

## 2023-10-14 PROCEDURE — 2500000003 HC RX 250 WO HCPCS: Performed by: INTERNAL MEDICINE

## 2023-10-14 PROCEDURE — 6360000004 HC RX CONTRAST MEDICATION: Performed by: INTERNAL MEDICINE

## 2023-10-14 PROCEDURE — 99223 1ST HOSP IP/OBS HIGH 75: CPT | Performed by: STUDENT IN AN ORGANIZED HEALTH CARE EDUCATION/TRAINING PROGRAM

## 2023-10-14 PROCEDURE — 51702 INSERT TEMP BLADDER CATH: CPT

## 2023-10-14 PROCEDURE — 6360000002 HC RX W HCPCS: Performed by: INTERNAL MEDICINE

## 2023-10-14 PROCEDURE — 2580000003 HC RX 258: Performed by: INTERNAL MEDICINE

## 2023-10-14 PROCEDURE — 70553 MRI BRAIN STEM W/O & W/DYE: CPT

## 2023-10-14 PROCEDURE — 80048 BASIC METABOLIC PNL TOTAL CA: CPT

## 2023-10-14 PROCEDURE — A9577 INJ MULTIHANCE: HCPCS | Performed by: INTERNAL MEDICINE

## 2023-10-14 PROCEDURE — 85025 COMPLETE CBC W/AUTO DIFF WBC: CPT

## 2023-10-14 PROCEDURE — 6370000000 HC RX 637 (ALT 250 FOR IP): Performed by: INTERNAL MEDICINE

## 2023-10-14 RX ORDER — LANOLIN ALCOHOL/MO/W.PET/CERES
3 CREAM (GRAM) TOPICAL NIGHTLY PRN
Status: DISCONTINUED | OUTPATIENT
Start: 2023-10-14 | End: 2023-10-16 | Stop reason: HOSPADM

## 2023-10-14 RX ORDER — HYDROMORPHONE HYDROCHLORIDE 1 MG/ML
1 INJECTION, SOLUTION INTRAMUSCULAR; INTRAVENOUS; SUBCUTANEOUS EVERY 4 HOURS PRN
Status: DISCONTINUED | OUTPATIENT
Start: 2023-10-14 | End: 2023-10-15

## 2023-10-14 RX ORDER — DICYCLOMINE HYDROCHLORIDE 10 MG/1
20 CAPSULE ORAL 4 TIMES DAILY PRN
Status: DISCONTINUED | OUTPATIENT
Start: 2023-10-14 | End: 2023-10-16 | Stop reason: HOSPADM

## 2023-10-14 RX ORDER — DIPHENHYDRAMINE HCL 25 MG
25 TABLET ORAL EVERY 6 HOURS PRN
Status: DISCONTINUED | OUTPATIENT
Start: 2023-10-14 | End: 2023-10-16 | Stop reason: HOSPADM

## 2023-10-14 RX ORDER — ACETAMINOPHEN 500 MG
1000 TABLET ORAL ONCE
Status: COMPLETED | OUTPATIENT
Start: 2023-10-14 | End: 2023-10-14

## 2023-10-14 RX ORDER — SENNOSIDES A AND B 8.6 MG/1
1 TABLET, FILM COATED ORAL NIGHTLY
Status: DISCONTINUED | OUTPATIENT
Start: 2023-10-14 | End: 2023-10-16 | Stop reason: HOSPADM

## 2023-10-14 RX ADMIN — DIPHENHYDRAMINE HYDROCHLORIDE 25 MG: 25 TABLET ORAL at 21:51

## 2023-10-14 RX ADMIN — FUROSEMIDE 40 MG: 10 INJECTION, SOLUTION INTRAMUSCULAR; INTRAVENOUS at 17:12

## 2023-10-14 RX ADMIN — SODIUM CHLORIDE, PRESERVATIVE FREE 10 ML: 5 INJECTION INTRAVENOUS at 21:54

## 2023-10-14 RX ADMIN — PANTOPRAZOLE SODIUM 40 MG: 40 TABLET, DELAYED RELEASE ORAL at 11:14

## 2023-10-14 RX ADMIN — SODIUM CHLORIDE, PRESERVATIVE FREE 10 ML: 5 INJECTION INTRAVENOUS at 13:15

## 2023-10-14 RX ADMIN — DICYCLOMINE HYDROCHLORIDE 20 MG: 10 CAPSULE ORAL at 02:03

## 2023-10-14 RX ADMIN — Medication 240 ML: at 10:51

## 2023-10-14 RX ADMIN — GADOBENATE DIMEGLUMINE 12 ML: 529 INJECTION, SOLUTION INTRAVENOUS at 13:11

## 2023-10-14 RX ADMIN — HYDROMORPHONE HYDROCHLORIDE 1 MG: 1 INJECTION, SOLUTION INTRAMUSCULAR; INTRAVENOUS; SUBCUTANEOUS at 13:31

## 2023-10-14 RX ADMIN — ACETAMINOPHEN 1000 MG: 500 TABLET ORAL at 21:51

## 2023-10-14 RX ADMIN — ENOXAPARIN SODIUM 40 MG: 100 INJECTION SUBCUTANEOUS at 10:51

## 2023-10-14 RX ADMIN — LEVETIRACETAM 1000 MG: 500 TABLET, FILM COATED ORAL at 21:51

## 2023-10-14 RX ADMIN — SENNOSIDES 8.6 MG: 8.6 TABLET, COATED ORAL at 02:03

## 2023-10-14 RX ADMIN — LEVETIRACETAM 1000 MG: 500 TABLET, FILM COATED ORAL at 10:52

## 2023-10-14 ASSESSMENT — PAIN DESCRIPTION - LOCATION
LOCATION: ABDOMEN

## 2023-10-14 ASSESSMENT — PAIN SCALES - GENERAL
PAINLEVEL_OUTOF10: 10
PAINLEVEL_OUTOF10: 7
PAINLEVEL_OUTOF10: 8
PAINLEVEL_OUTOF10: 10

## 2023-10-14 ASSESSMENT — PAIN DESCRIPTION - ORIENTATION: ORIENTATION: LOWER

## 2023-10-14 ASSESSMENT — PAIN DESCRIPTION - DESCRIPTORS: DESCRIPTORS: ACHING

## 2023-10-14 NOTE — ED NOTES
ED TO INPATIENT SBAR HANDOFF    Patient Name: Paulette Lennox   :  1965  59 y.o. MRN:  [id number]  Preferred Name  Jahaira Vasquez  ED Room #:  ED-0009/09  Family/Caregiver Present yes   Restraints no   Sitter no   Sepsis Risk Score Sepsis Risk Score: 4.92    Situation  Code Status: Prior No additional code details. Allergies: Patient has no known allergies. Weight: Patient Vitals for the past 96 hrs (Last 3 readings):   Weight   10/13/23 1427 120 lb (54.4 kg)     Arrived from: home  Chief Complaint:   Chief Complaint   Patient presents with    Shortness of Breath     Pt via family from home, c/o sob and fatigue, endorses dizziness, hx of lung cancer, metastasized to numerous areas, pt states she has a DNR signed     Hospital Problem/Diagnosis:  Principal Problem:    Primary lung adenocarcinoma ([address])  Active Problems:    Brain metastases    S/P craniotomy    HTN (hypertension), benign    Hypokalemia    Cancer associated pain    SOB (shortness of breath)  Resolved Problems:    * No resolved hospital problems. *    Imaging:   CT CHEST PULMONARY EMBOLISM W CONTRAST   Final Result   1. No pulmonary embolism. 2. New large bilateral pleural effusions and adjacent airspace opacities in   the lower lobes favored to represent associated atelectasis. 3. Disease progression. A left upper lobe nodule near the hilum now measures   up to 2.3 cm versus 1.9 cm previously. The new 4 mm left upper lobe nodule   is larger now measuring 7 mm and there is a new 6 mm left upper lobe nodule. Mediastinal lymphadenopathy has progressed and there are new metastases in   the liver with the largest in segment 7 measuring 3.2 cm. XR CHEST PORTABLE   Final Result   1. Interval development of small bilateral pleural effusions with   atelectasis/opacification of the underlying lungs. 2. Mild vascular prominence bilaterally.            Abnormal labs:   Abnormal Labs Reviewed   CBC WITH AUTO DIFFERENTIAL - Abnormal; Notable for the following components:       Result Value    RBC 3.25 (*)     Hemoglobin 9.8 (*)     Hematocrit 31.8 (*)     MCHC 30.8 (*)     RDW 24.9 (*)     Lymphocytes Absolute 0.5 (*)     Monocytes Absolute 1.9 (*)     Metamyelocytes Relative 1 (*)     Promyelocytes Percent 1 (*)     nRBC 5 (*)     Anisocytosis 1+ (*)     Macrocytes 1+ (*)     Polychromasia 1+ (*)     Ovalocytes Occasional (*)     Target Cells Occasional (*)     Tear Drop Cells Occasional (*)     All other components within normal limits   COMPREHENSIVE METABOLIC PANEL - Abnormal; Notable for the following components:    Sodium 133 (*)     Potassium 2.9 (*)     Chloride 96 (*)     Glucose 106 (*)     Total Protein 6.2 (*)     Albumin 3.1 (*)     Albumin/Globulin Ratio 1.0 (*)     Total Bilirubin 1.1 (*)     ALT 7 (*)     All other components within normal limits    Narrative:     CALL  [facility] tel. [phone],  Chemistry results called to and read back by Destini Dash RN, 10/13/2023  16:44, by Stacey Jain   TROPONIN - Abnormal; Notable for the following components:    Troponin, High Sensitivity 26 (*)     All other components within normal limits    Narrative:     CALL  [place]  [facility] tel. [phone],  Chemistry results called to and read back by Destini Dash RN, 10/13/2023  16:44, by Suraj Contreras - Abnormal; Notable for the following components:    Pro-BNP 1,837 (*)     All other components within normal limits    Narrative:     CALL  [place]  [facility] tel. [phone],  Chemistry results called to and read back by Destini Dash RN, 10/13/2023  16:44, by Stacey Jain   BLOOD GAS, VENOUS - Abnormal; Notable for the following components:    pH, Reinaldo 7.472 (*)     pCO2, Reinalod 32.6 (*)     pO2, Reinaldo 128.0 (*)     Carboxyhemoglobin 7.4 (*)     All other components within normal limits   PROCALCITONIN - Abnormal; Notable for the following components:    Procalcitonin 0.49 (*)     All other components within normal limits     Critical values: yes: see labs, hx Face Mask

## 2023-10-14 NOTE — PLAN OF CARE
Problem: Pain  Goal: Verbalizes/displays adequate comfort level or baseline comfort level  Outcome: Not Progressing    Problem: Safety - Adult  Goal: Free from fall injury  Outcome: Progressing

## 2023-10-14 NOTE — PROGRESS NOTES
Cranial nerves grossly intact. Alert and oriented in time, place and person. No speech or motor deficits  Psychiatry: Appropriate affect. Not agitated  Skin: Warm, dry, normal turgor, no rash  Brisk capillary refill, peripheral pulses palpable       Labs:   Recent Labs     10/13/23  1605 10/14/23  0608   WBC 9.3 9.5   HGB 9.8* 9.5*   HCT 31.8* 30.8*    268     Recent Labs     10/13/23  1605 10/14/23  0608   * 135*   K 2.9* 3.6   CL 96* 99   CO2 22 22   BUN 10 8   CREATININE 0.7 0.6   CALCIUM 8.7 8.8     Recent Labs     10/13/23  1605   AST 16   ALT 7*   BILITOT 1.1*   ALKPHOS 100     No results for input(s): \"INR\" in the last 72 hours. Recent Labs     10/13/23  1605   TROPHS 26*       Urinalysis:      Lab Results   Component Value Date/Time    NITRU Negative 01/20/2023 02:22 PM    [address] [facility] 3-5 01/20/2023 02:22 PM    BACTERIA 2+ 01/20/2023 02:22 PM    RBCUA 0-2 01/20/2023 02:22 PM    BLOODU Negative 01/20/2023 02:22 PM    SPECGRAV 1.015 01/20/2023 02:22 PM    GLUCOSEU Negative 01/20/2023 02:22 PM    GLUCOSEU NEGATIVE 03/21/2010 11:45 PM       Radiology:  XR ABDOMEN (KUB) (SINGLE AP VIEW)   Final Result   No acute abnormality. Contrast within the bladder with a few questionable filling defects which   could represent contrast within the adjacent colon/rectum versus underlying   lesions. Correlate with contrast history, and if clinically warranted,   further characterization could be obtained with a CT pelvis with contrast.      Unchanged small bilateral pleural effusions with adjacent opacities which   likely represents atelectasis with infection not excluded. CT CHEST PULMONARY EMBOLISM W CONTRAST   Final Result   1. No pulmonary embolism. 2. New large bilateral pleural effusions and adjacent airspace opacities in   the lower lobes favored to represent associated atelectasis. 3. Disease progression.   A left upper lobe nodule near the hilum now measures   up to 2.3 cm versus 1.9 cm

## 2023-10-14 NOTE — PROGRESS NOTES
Pt has been c/o abdominal pain 10/10 and constipation. Pt had several small BM, however still felt constipated. Miralax given at 2302 with no improvement. Perfect served NP Adam Vance at 7339. New orders for senakot, PRN bentyl, and one time soap suds enema.

## 2023-10-14 NOTE — PROGRESS NOTES
Pharmacy Home Medication Reconciliation Note    A medication reconciliation has been completed for Maira Browne 1965    Pharmacy: [facility] [address] [place] [address]    Information provided by: Patient    The patient's home medication list is as follows: No current facility-administered medications on file prior to encounter. Current Outpatient Medications on File Prior to Encounter   Medication Sig Dispense Refill    oxyCODONE (OXYCONTIN) 10 MG extended release tablet Take 1 tablet by mouth every 4-6 hours as needed for Pain. Max Daily Amount: 60 mg      levETIRAcetam (KEPPRA) 500 MG tablet Take 2 tablets by mouth 2 times daily (Patient taking differently: Take 1 tablet by mouth 2 times daily) 60 tablet 1    pantoprazole (PROTONIX) 40 MG tablet Take 1 tablet by mouth every morning (before breakfast) 30 tablet 0    ondansetron (ZOFRAN-ODT) 4 MG disintegrating tablet Take 1 tablet by mouth every 8 hours as needed for Nausea or Vomiting 30 tablet 0    atenolol (TENORMIN) 100 MG tablet Take 1 tablet by mouth daily      atorvastatin (LIPITOR) 40 MG tablet TAKE 1 TABLET EVERY DAY (Patient taking differently: Take 1 tablet by mouth daily TAKE 1 TABLET EVERY DAY) 90 tablet 0     Of note, Patient takes Keppra 500mg tab BID; most recent dose was the evening of 10/12. Timing of last doses updated.     Thank you,  Satnam Quintero, CPhT

## 2023-10-14 NOTE — PROGRESS NOTES
4 Eyes Skin Assessment     NAME:  Haven Guy  YOB: 1965  MEDICAL RECORD NUMBER:  [id number]    The patient is being assessed for  Admission    I agree that at least one RN has performed a thorough Head to Toe Skin Assessment on the patient. ALL assessment sites listed below have been assessed. Areas assessed by both nurses:    Head, Face, Ears, Shoulders, Back, Chest, Arms, Elbows, Hands, Sacrum. Buttock, Coccyx, Ischium, Legs. Feet and Heels, Under Medical Devices , and Other          Does the Patient have a Wound?  No noted wound(s)       Mohan Prevention initiated by RN: No  Wound Care Orders initiated by RN: No    Pressure Injury (Stage 3,4, Unstageable, DTI, NWPT, and Complex wounds) if present, place Wound referral order by RN under : No    New Ostomies, if present place, Ostomy referral order under : No     Nurse 1 eSignature: Electronically signed by Tarun Jurado RN on 10/14/23 at 2:57 AM EDT      Nurse 2 eSignature: Electronically signed by Valeri Ling LPN on 32/93/68 at 5:62 AM EDT

## 2023-10-15 LAB
ANION GAP SERPL CALCULATED.3IONS-SCNC: 14 MMOL/L (ref 3–16)
BASOPHILS # BLD: 0.1 K/UL (ref 0–0.2)
BASOPHILS NFR BLD: 1 %
BUN SERPL-MCNC: 9 MG/DL (ref 7–20)
CALCIUM SERPL-MCNC: 8.7 MG/DL (ref 8.3–10.6)
CHLORIDE SERPL-SCNC: 100 MMOL/L (ref 99–110)
CO2 SERPL-SCNC: 25 MMOL/L (ref 21–32)
CREAT SERPL-MCNC: 0.7 MG/DL (ref 0.6–1.2)
DEPRECATED RDW RBC AUTO: 24.4 % (ref 12.4–15.4)
EOSINOPHIL # BLD: 0.1 K/UL (ref 0–0.6)
EOSINOPHIL NFR BLD: 1.1 %
GFR SERPLBLD CREATININE-BSD FMLA CKD-EPI: >60 ML/MIN/{1.73_M2}
GLUCOSE SERPL-MCNC: 97 MG/DL (ref 70–99)
HCT VFR BLD AUTO: 29.8 % (ref 36–48)
HGB BLD-MCNC: 9.3 G/DL (ref 12–16)
LYMPHOCYTES # BLD: 1 K/UL (ref 1–5.1)
LYMPHOCYTES NFR BLD: 14.2 %
MAGNESIUM SERPL-MCNC: 1.4 MG/DL (ref 1.8–2.4)
MCH RBC QN AUTO: 30 PG (ref 26–34)
MCHC RBC AUTO-ENTMCNC: 31.1 G/DL (ref 31–36)
MCV RBC AUTO: 96.3 FL (ref 80–100)
MONOCYTES # BLD: 1.7 K/UL (ref 0–1.3)
MONOCYTES NFR BLD: 24.1 %
NEUTROPHILS # BLD: 4.2 K/UL (ref 1.7–7.7)
NEUTROPHILS NFR BLD: 59.6 %
PATH INTERP BLD-IMP: NO
PLATELET # BLD AUTO: 275 K/UL (ref 135–450)
PMV BLD AUTO: 7.3 FL (ref 5–10.5)
POTASSIUM SERPL-SCNC: 3.1 MMOL/L (ref 3.5–5.1)
RBC # BLD AUTO: 3.1 M/UL (ref 4–5.2)
SODIUM SERPL-SCNC: 139 MMOL/L (ref 136–145)
WBC # BLD AUTO: 7 K/UL (ref 4–11)

## 2023-10-15 PROCEDURE — 6370000000 HC RX 637 (ALT 250 FOR IP): Performed by: NURSE PRACTITIONER

## 2023-10-15 PROCEDURE — 2500000003 HC RX 250 WO HCPCS: Performed by: INTERNAL MEDICINE

## 2023-10-15 PROCEDURE — 83735 ASSAY OF MAGNESIUM: CPT

## 2023-10-15 PROCEDURE — 6360000002 HC RX W HCPCS: Performed by: INTERNAL MEDICINE

## 2023-10-15 PROCEDURE — 80048 BASIC METABOLIC PNL TOTAL CA: CPT

## 2023-10-15 PROCEDURE — 85025 COMPLETE CBC W/AUTO DIFF WBC: CPT

## 2023-10-15 PROCEDURE — 6370000000 HC RX 637 (ALT 250 FOR IP): Performed by: INTERNAL MEDICINE

## 2023-10-15 PROCEDURE — 1200000000 HC SEMI PRIVATE

## 2023-10-15 RX ORDER — LORAZEPAM 2 MG/ML
1 CONCENTRATE ORAL
Qty: 30 ML | Refills: 0 | Status: SHIPPED | OUTPATIENT
Start: 2023-10-15 | End: 2023-10-29

## 2023-10-15 RX ORDER — HYDROMORPHONE HYDROCHLORIDE 1 MG/ML
1 INJECTION, SOLUTION INTRAMUSCULAR; INTRAVENOUS; SUBCUTANEOUS
Status: DISCONTINUED | OUTPATIENT
Start: 2023-10-15 | End: 2023-10-16 | Stop reason: HOSPADM

## 2023-10-15 RX ORDER — SCOLOPAMINE TRANSDERMAL SYSTEM 1 MG/1
1 PATCH, EXTENDED RELEASE TRANSDERMAL
Qty: 7 PATCH | Refills: 0 | Status: SHIPPED | OUTPATIENT
Start: 2023-10-15

## 2023-10-15 RX ORDER — MORPHINE SULFATE 100 MG/5ML
10 SOLUTION ORAL
Qty: 30 ML | Refills: 0 | Status: SHIPPED | OUTPATIENT
Start: 2023-10-15 | End: 2023-10-22

## 2023-10-15 RX ORDER — LORAZEPAM 2 MG/ML
1 INJECTION INTRAMUSCULAR EVERY 4 HOURS PRN
Status: DISCONTINUED | OUTPATIENT
Start: 2023-10-15 | End: 2023-10-16 | Stop reason: HOSPADM

## 2023-10-15 RX ORDER — SCOLOPAMINE TRANSDERMAL SYSTEM 1 MG/1
1 PATCH, EXTENDED RELEASE TRANSDERMAL
Status: DISCONTINUED | OUTPATIENT
Start: 2023-10-15 | End: 2023-10-16 | Stop reason: HOSPADM

## 2023-10-15 RX ADMIN — HYDROMORPHONE HYDROCHLORIDE 1 MG: 1 INJECTION, SOLUTION INTRAMUSCULAR; INTRAVENOUS; SUBCUTANEOUS at 11:32

## 2023-10-15 RX ADMIN — PANTOPRAZOLE SODIUM 40 MG: 40 TABLET, DELAYED RELEASE ORAL at 06:06

## 2023-10-15 RX ADMIN — ONDANSETRON 4 MG: 2 INJECTION INTRAMUSCULAR; INTRAVENOUS at 11:32

## 2023-10-15 RX ADMIN — SENNOSIDES 8.6 MG: 8.6 TABLET, COATED ORAL at 20:52

## 2023-10-15 RX ADMIN — ACETAMINOPHEN 650 MG: 325 TABLET ORAL at 14:14

## 2023-10-15 RX ADMIN — ENOXAPARIN SODIUM 40 MG: 100 INJECTION SUBCUTANEOUS at 09:41

## 2023-10-15 RX ADMIN — HYDROMORPHONE HYDROCHLORIDE 1 MG: 1 INJECTION, SOLUTION INTRAMUSCULAR; INTRAVENOUS; SUBCUTANEOUS at 18:40

## 2023-10-15 RX ADMIN — LEVETIRACETAM 1000 MG: 500 TABLET, FILM COATED ORAL at 20:52

## 2023-10-15 RX ADMIN — HYDROMORPHONE HYDROCHLORIDE 1 MG: 1 INJECTION, SOLUTION INTRAMUSCULAR; INTRAVENOUS; SUBCUTANEOUS at 15:10

## 2023-10-15 RX ADMIN — HYDROMORPHONE HYDROCHLORIDE 1 MG: 1 INJECTION, SOLUTION INTRAMUSCULAR; INTRAVENOUS; SUBCUTANEOUS at 20:53

## 2023-10-15 RX ADMIN — LEVETIRACETAM 1000 MG: 500 TABLET, FILM COATED ORAL at 09:41

## 2023-10-15 ASSESSMENT — PAIN SCALES - GENERAL
PAINLEVEL_OUTOF10: 7
PAINLEVEL_OUTOF10: 8
PAINLEVEL_OUTOF10: 7
PAINLEVEL_OUTOF10: 10
PAINLEVEL_OUTOF10: 7

## 2023-10-15 ASSESSMENT — PAIN DESCRIPTION - LOCATION
LOCATION: GENERALIZED
LOCATION: ABDOMEN;LEG
LOCATION: GENERALIZED

## 2023-10-15 ASSESSMENT — PAIN DESCRIPTION - ORIENTATION: ORIENTATION: RIGHT

## 2023-10-15 NOTE — PROGRESS NOTES
Per MD Tonja Oshea patient changing over to comfort care with hospice consult placed. Ok to remove tele, oxygen, and no need for routine vitals unless requested by patient or family.  Patient to receive dilaudid for pain and comfort despite low BP per MD.

## 2023-10-16 ENCOUNTER — APPOINTMENT (OUTPATIENT)
Dept: INTERVENTIONAL RADIOLOGY/VASCULAR | Age: 58
DRG: 180 | End: 2023-10-16
Payer: COMMERCIAL

## 2023-10-16 ENCOUNTER — APPOINTMENT (OUTPATIENT)
Dept: GENERAL RADIOLOGY | Age: 58
DRG: 180 | End: 2023-10-16
Payer: COMMERCIAL

## 2023-10-16 VITALS
HEIGHT: 65 IN | TEMPERATURE: 98 F | WEIGHT: 120 LBS | OXYGEN SATURATION: 90 % | DIASTOLIC BLOOD PRESSURE: 59 MMHG | SYSTOLIC BLOOD PRESSURE: 82 MMHG | HEART RATE: 114 BPM | RESPIRATION RATE: 22 BRPM | BODY MASS INDEX: 19.99 KG/M2

## 2023-10-16 LAB
INR PPP: 1.06 (ref 0.84–1.16)
PATH INTERP BLD-IMP: NORMAL
PROTHROMBIN TIME: 13.9 SEC (ref 11.5–14.8)

## 2023-10-16 PROCEDURE — 71045 X-RAY EXAM CHEST 1 VIEW: CPT

## 2023-10-16 PROCEDURE — 2580000003 HC RX 258: Performed by: INTERNAL MEDICINE

## 2023-10-16 PROCEDURE — 6370000000 HC RX 637 (ALT 250 FOR IP): Performed by: INTERNAL MEDICINE

## 2023-10-16 PROCEDURE — 99233 SBSQ HOSP IP/OBS HIGH 50: CPT | Performed by: STUDENT IN AN ORGANIZED HEALTH CARE EDUCATION/TRAINING PROGRAM

## 2023-10-16 PROCEDURE — C1729 CATH, DRAINAGE: HCPCS

## 2023-10-16 PROCEDURE — 6360000002 HC RX W HCPCS: Performed by: INTERNAL MEDICINE

## 2023-10-16 PROCEDURE — 85610 PROTHROMBIN TIME: CPT

## 2023-10-16 PROCEDURE — 88112 CYTOPATH CELL ENHANCE TECH: CPT

## 2023-10-16 PROCEDURE — 0W993ZZ DRAINAGE OF RIGHT PLEURAL CAVITY, PERCUTANEOUS APPROACH: ICD-10-PCS | Performed by: RADIOLOGY

## 2023-10-16 PROCEDURE — 2500000003 HC RX 250 WO HCPCS: Performed by: INTERNAL MEDICINE

## 2023-10-16 PROCEDURE — 88305 TISSUE EXAM BY PATHOLOGIST: CPT

## 2023-10-16 PROCEDURE — 32555 ASPIRATE PLEURA W/ IMAGING: CPT

## 2023-10-16 RX ORDER — LIDOCAINE HYDROCHLORIDE 10 MG/ML
5 INJECTION, SOLUTION EPIDURAL; INFILTRATION; INTRACAUDAL; PERINEURAL ONCE
Status: COMPLETED | OUTPATIENT
Start: 2023-10-16 | End: 2023-10-16

## 2023-10-16 RX ADMIN — LORAZEPAM 1 MG: 2 INJECTION INTRAMUSCULAR; INTRAVENOUS at 18:57

## 2023-10-16 RX ADMIN — HYDROMORPHONE HYDROCHLORIDE 1 MG: 1 INJECTION, SOLUTION INTRAMUSCULAR; INTRAVENOUS; SUBCUTANEOUS at 11:04

## 2023-10-16 RX ADMIN — LORAZEPAM 1 MG: 2 INJECTION INTRAMUSCULAR; INTRAVENOUS at 08:42

## 2023-10-16 RX ADMIN — HYDROMORPHONE HYDROCHLORIDE 1 MG: 1 INJECTION, SOLUTION INTRAMUSCULAR; INTRAVENOUS; SUBCUTANEOUS at 08:42

## 2023-10-16 RX ADMIN — HYDROMORPHONE HYDROCHLORIDE 1 MG: 1 INJECTION, SOLUTION INTRAMUSCULAR; INTRAVENOUS; SUBCUTANEOUS at 18:57

## 2023-10-16 RX ADMIN — LIDOCAINE HYDROCHLORIDE 5 ML: 10 INJECTION, SOLUTION EPIDURAL; INFILTRATION; INTRACAUDAL; PERINEURAL at 09:40

## 2023-10-16 RX ADMIN — LEVETIRACETAM 1000 MG: 500 TABLET, FILM COATED ORAL at 08:31

## 2023-10-16 RX ADMIN — LIDOCAINE HYDROCHLORIDE 5 ML: 10 INJECTION, SOLUTION EPIDURAL; INFILTRATION; INTRACAUDAL; PERINEURAL at 11:54

## 2023-10-16 RX ADMIN — SODIUM CHLORIDE, PRESERVATIVE FREE 10 ML: 5 INJECTION INTRAVENOUS at 03:41

## 2023-10-16 RX ADMIN — HYDROMORPHONE HYDROCHLORIDE 1 MG: 1 INJECTION, SOLUTION INTRAMUSCULAR; INTRAVENOUS; SUBCUTANEOUS at 03:41

## 2023-10-16 RX ADMIN — SODIUM CHLORIDE, PRESERVATIVE FREE 10 ML: 5 INJECTION INTRAVENOUS at 11:06

## 2023-10-16 ASSESSMENT — PAIN - FUNCTIONAL ASSESSMENT
PAIN_FUNCTIONAL_ASSESSMENT: PREVENTS OR INTERFERES SOME ACTIVE ACTIVITIES AND ADLS

## 2023-10-16 ASSESSMENT — PAIN SCALES - GENERAL
PAINLEVEL_OUTOF10: 8
PAINLEVEL_OUTOF10: 7

## 2023-10-16 ASSESSMENT — PAIN DESCRIPTION - ORIENTATION: ORIENTATION: OTHER (COMMENT)

## 2023-10-16 ASSESSMENT — PAIN DESCRIPTION - DESCRIPTORS: DESCRIPTORS: OTHER (COMMENT)

## 2023-10-16 ASSESSMENT — PAIN DESCRIPTION - LOCATION
LOCATION: OTHER (COMMENT)
LOCATION: GENERALIZED

## 2023-10-16 NOTE — PROGRESS NOTES
RN discharge summary from [facility] [place] to home. This patient has had a discharge order placed. They are returning home and being picked up in the lobby. Discharge paperwork has been printed, highlighted, and gone over with the patient by this RN. Patient understands teaching and has no further questions at this time. IV has been removed with no complications. Pt has all belongings present.

## 2023-10-16 NOTE — CARE COORDINATION
Case Management -  Discharge Note      Patient Name: Chucky Mcnally                   YOB: 1965            Readmission Risk (Low < 19, Mod (19-27), High > 27): Readmission Risk Score: 17.6    Current PCP: Lara Chicas MD    ([facility]) Important Message from Medicare:    Date: n/a    PT AM-PAC:   /24  OT AM-PAC:   /24    [facility]  [address]  [address]  Phone: [phone]      [facility] [phone]     Financial    Payor: Jarred Nath / Plan: [address] [place] [address] A / Product Type: *No Product type* /     Pharmacy:  Potential assistance Purchasing Medications:    Meds-to-Beds request: Yes      [address] [id number], [address] [facility] [address] [phone] Unknown Phy [phone] [address] [facility] [address] [facility] [address]  Phone: [phone] Fax: [phone]    [address] [place] [address] 135[address] [facility], [address] [place] [address] [place] [address] [phone] - F [phone] [address] [facility]. [address] [place] [address]  Phone: [phone] Fax: [phone]      Notes: Additional Case Management Notes: Patient will discharge home today with hospice services. No further needs at this time.     Electronically signed by Odalis Navarro on 10/16/23 at 4:02 PM EDT

## 2023-10-16 NOTE — PROGRESS NOTES
Assessment complete. Patient resting in bed. Respirations even and easy. Call light in reach. Fall precautions in place. No needs expressed at this time. Pt going home on hospice today, no vitals or head to toe completed.

## 2023-10-16 NOTE — PROGRESS NOTES
IR GUIDED THORACENTESIS PLEURAL   Final Result   Successful ultrasound guided left thoracentesis. XR CHEST 1 VIEW   Final Result   No right-sided pneumothorax. XR CHEST 1 VIEW   Final Result   Significant decrease in right pleural effusion with no postprocedural   pneumothorax. Stable small to moderate left pleural effusion      Perihilar opacities similar to prior examination         IR GUIDED THORACENTESIS PLEURAL   Final Result   Successful ultrasound guided right thoracentesis. MRI BRAIN W WO CONTRAST   Final Result   Progressive metastatic disease within the brain with approximately 16 lesions   identified. The largest lesion is new and is located within the left   posterior frontal lobe measuring 1.3 cm. Left frontal craniotomy with enhancement within the surgical bed that has   improved likely representing granulation tissue. XR ABDOMEN (KUB) (SINGLE AP VIEW)   Final Result   No acute abnormality. Contrast within the bladder with a few questionable filling defects which   could represent contrast within the adjacent colon/rectum versus underlying   lesions. Correlate with contrast history, and if clinically warranted,   further characterization could be obtained with a CT pelvis with contrast.      Unchanged small bilateral pleural effusions with adjacent opacities which   likely represents atelectasis with infection not excluded. CT CHEST PULMONARY EMBOLISM W CONTRAST   Final Result   1. No pulmonary embolism. 2. New large bilateral pleural effusions and adjacent airspace opacities in   the lower lobes favored to represent associated atelectasis. 3. Disease progression. A left upper lobe nodule near the hilum now measures   up to 2.3 cm versus 1.9 cm previously. The new 4 mm left upper lobe nodule   is larger now measuring 7 mm and there is a new 6 mm left upper lobe nodule.    Mediastinal lymphadenopathy has progressed and there are new metastases in   the liver with the largest in segment 7 measuring 3.2 cm. XR CHEST PORTABLE   Final Result   1. Interval development of small bilateral pleural effusions with   atelectasis/opacification of the underlying lungs. 2. Mild vascular prominence bilaterally. IP CONSULT TO HOSPITALIST  IP CONSULT TO PULMONOLOGY  IP CONSULT TO ONCOLOGY  IP CONSULT TO HOSPICE    Assessment/Plan:    Active Hospital Problems    Diagnosis     S/P craniotomy [Z98.890]      Priority: Medium    Brain metastases [C79.31]      Priority: Medium    Primary lung adenocarcinoma (HCC) [C34.90]     Hypokalemia [E87.6]     Cancer associated pain [G89.3]     SOB (shortness of breath) [R06.02]     HTN (hypertension), benign [I10]      BL pleural effusions  S/P BL IR thoracentesis on 10/16/23  FR 1.5 L daily  Strict I/O  Daily weights on scale  Echo cancelled  SOB with acute respiratory failure with hypoxia  Wean off O2 as tolerated  Can go home with home O2 from hospice for comfort  Metastatic lung adencocarcinoma  On treatment with Oncology  Oncology consult appreciated  CT with worsening disease  MRI Brain with worsening mets  DNR-CC  Consult hospice for home hospice  Acute metabolic encephalopathy  MRI Brain with contrast with worsening metastatic disease  Continue Keppra  Diarrhea  Molasses enema resolved impaction  Hypokalemia  Repleted and repeated  Follow Mg  DC Telemetry  Cancer associated pain  Continue Dilaudid IV PRN pain    DVT Prophylaxis: Lovenox  Diet: Diet NPO  Code Status: Limited  PT/OT Eval Status: Not needed    Dispo - Home with [facility] with Angelica    Discussed with patient, nursing and CM    Discussed with Dr Gilbert Silverio (Pulm). For IR BL thoracentesis. Home with hospice as soon as arranged.     Dwight Silva MD

## 2023-10-16 NOTE — PROGRESS NOTES
Pulmonary and Critical Care Medicine    CC: SOB   Date: 10/16/2023  Admit Date:  10/13/2023  Reason for Consultation: SOB  Consult Requesting Physician: MD TORSTEN Guo     This is a 58 y/o F w/ a hx of lung Adeno CA (Stage IV), metastatic brain lesion s/p Gamma knife, HTN, seizure, HLD who [facility] on 10/13 with SOB, dizziness. Overnight:  Patient had right thoracentesis 900 cc. She is planned for left thoracentesis in a few hours  Based on discussion with Med Onc  She is going home on hospice     ROS: negative except stated above     OBJECTIVE DATA       PHYSICAL EXAM:   No data recorded    General: NAD  Lung: Clear, no wheezing, equal non labored  Heart: regular rate    MEDICATIONS: Reviewed  Scheduled Meds:   scopolamine  1 patch TransDERmal Q72H    senna  1 tablet Oral Nightly    levETIRAcetam  1,000 mg Oral BID    pantoprazole  40 mg Oral QAM AC    sodium chloride flush  5-40 mL IntraVENous 2 times per day    enoxaparin  40 mg SubCUTAneous Daily     Continuous Infusions:   sodium chloride       PRN Meds:. HYDROmorphone, LORazepam, dicyclomine, perflutren lipid microspheres, diphenhydrAMINE, melatonin, sodium chloride flush, sodium chloride, ondansetron **OR** ondansetron, polyethylene glycol, acetaminophen **OR** acetaminophen     LABS: Reviewed.    Recent Labs     10/13/23  1605 10/14/23  0608 10/15/23  0528   * 135* 139   K 2.9* 3.6 3.1*   CL 96* 99 100   CO2 22 22 25   BUN 10 8 9   CREATININE 0.7 0.6 0.7     Recent Labs     10/13/23  1605 10/14/23  0608 10/15/23  0528   WBC 9.3 9.5 7.0   HGB 9.8* 9.5* 9.3*   HCT 31.8* 30.8* 29.8*   MCV 97.7 96.6 96.3    268 275     Lab Results   Component Value Date/Time    PROTIME 13.9 10/16/2023 08:30 AM    PROTIME 12.7 03/14/2023 05:13 AM    PROTIME 12.1 03/13/2023 07:03 AM    INR 1.06 10/16/2023 08:30 AM    INR 0.97 03/14/2023 05:13 AM    INR 0.91 03/13/2023 07:03 AM     Lab Results   Component Value Date/Time    GGV3FFL 22.2 05/09/2022 06:48 PM    BEART -3.0 05/09/2022 06:48 PM    B2OMQIRU 99.9 05/09/2022 06:48 PM    PHART 7.361 05/09/2022 06:48 PM    TCH7QGZ 39.2 05/09/2022 06:48 PM    PO2ART 141.0 05/09/2022 06:48 PM    QLB9QXL 52.4 05/09/2022 06:48 PM       Intake/Output Summary (Last 24 hours) at 10/16/2023 1042  Last data filed at 10/16/2023 0551  Gross per 24 hour   Intake 0 ml   Output 270 ml   Net -270 ml      In: 120 [P.O.:120]  Out: 270      IMAGES: Reviewed       ASSESSMENT AND PLAN:     Acute hypoxic respiratory failure 3L NC   Bilateral pleural effusion   Stage Lung Adeno CA IV  Hx of seziure  Metastatic brain lesion - s/p gamma knife   Anemia  HypoK     Recommendations:  - s/p R thoracentesis 900  - planned for left thoracentesis today  - noted based on med onc discussion patient is going home on hospice   - discussed with patient if her fluid re-accumulates and depending on her fluid cytology she will benefit with PleurX, my contact number provided on discharge paper work  - discussed with IR to send both fluid for cytology    Sister present during my visit.      Discussed with Hospitalist, pulmonary medicine will sign off, please call us back if we can help    55 minutes - moderate risk     Hue Barnes MD  Pulmonary and [address]

## 2023-10-16 NOTE — PROGRESS NOTES
CLINICAL PHARMACY NOTE: MEDS TO BEDS    Total # of Prescriptions Filled: 3   The following medications were delivered to the patient:  SCOPOLAMINE 1MG  LORAZEPAM 2MG  MORPHINE SULFATE     Additional Documentation:  [facility]  [address] [facility] to be delivered per  [address]

## 2023-10-16 NOTE — PROGRESS NOTES
800ml of fluid removed  Spoke to patients RN  and advised her the amount of fluid removed and that patient was returning to the floor.

## 2023-10-16 NOTE — PROGRESS NOTES
900ml of fluid removed  Spoke to patients RN  and advised her the amount of fluid removed and that patient was returning to the floor.

## 2023-10-17 LAB
BACTERIA BLD CULT ORG #2: NORMAL
BACTERIA BLD CULT: NORMAL

## 2023-10-18 NOTE — PROGRESS NOTES
Physician Progress Note      Elliot Richards  [facility] #:                  [id number]  :                       1965  ADMIT DATE:       10/13/2023 2:12 PM  [address] [place] [address] DATE:        10/16/2023 7:25 PM  RESPONDING  PROVIDER #:        Jt Martins MD          QUERY TEXT:    Patient admitted with shortness of breath, noted to have metastatic lung   cancer and bilateral pleural effusions. If possible, please document in   progress notes and d/c summary further specificity regarding the   type/underlying cause of pleural effusion: The medical record reflects the following:  Risk Factors: 59year old female w/ metastatic lung cancer  Clinical Indicators: 10/16 cytology report- Right pleural fluid: No malignant   cells identified. Left pleural fluid:?Rare atypical cells with degeneration. 10/16 Oncology progress note- CT chest from 10/13/2023 showed large bilateral   pleural effusions as well as disease progression. Treatment: Imaging, thoracentesis  Options provided:  -- Malignant pleural effusion due to adenocarcinoma of left lung  -- Other - I will add my own diagnosis  -- Disagree - Not applicable / Not valid  -- Disagree - Clinically unable to determine / Unknown  -- Refer to Clinical Documentation Reviewer    PROVIDER RESPONSE TEXT:    Patient has a Malignant pleural effusion due to adenocarcinoma of left lung. Query created by: Josefa Alvarez on 10/18/2023 11:34 AM      QUERY TEXT:    Pt admitted with shortness of breath, dizziness and weakness. Pt noted to have   metastatic lung adenocarcinoma to brain and liver. MRI brain with   documentation of vasogenic edema. If clinically significant, please document   in progress notes and discharge summary if you are evaluating/treating any of   the following:     The medical record reflects the following:  Risk Factors: 59year old female with metastatic lung cancer to brain  Clinical Indicators: 10/14 MRI brain- The largest lesion is located within the   posterior left frontal lobe measuring 1.3 cm and contains mild surrounding   vasogenic edema. Treatment: Imaging, neuro checks  Options provided:  -- Cerebral edema  -- Other - I will add my own diagnosis  -- Disagree - Not applicable / Not valid  -- Disagree - Clinically unable to determine / Unknown  -- Refer to Clinical Documentation Reviewer    PROVIDER RESPONSE TEXT:    This patient has cerebral edema. Query created by:  Akira Singleton on 10/18/2023 11:34 AM      Electronically signed by:  Dimitri Alarcon MD 10/18/2023 11:42 AM

## (undated) DEVICE — APPLICATOR MEDICATED 26 CC SOLUTION HI LT ORNG CHLORAPREP

## (undated) DEVICE — STAPLER SKIN H3.9MM WIRE DIA0.58MM CRWN 6.9MM 35 STPL ROT

## (undated) DEVICE — CRANI: Brand: MEDLINE INDUSTRIES, INC.

## (undated) DEVICE — ADHESIVE SKIN CLSR 0.7ML TOP DERMBND ADV

## (undated) DEVICE — MARKER REFLECTIVE REFLECTIVE TWST ON SPHERZ 5PK

## (undated) DEVICE — HYPODERMIC SAFETY NEEDLE: Brand: MAGELLAN

## (undated) DEVICE — PENCIL SMK EVAC TELSCP 3 M TBNG

## (undated) DEVICE — GLOVE SURG SZ 6.5 L11.2IN FNGR THK9.8MIL STRW LTX POLYMER

## (undated) DEVICE — SOLUTION IV 500ML 0.9% SOD CHL PH 5 INJ USP VIAFLX PLAS

## (undated) DEVICE — BLADE CLIPPER SURG SENSICLIP

## (undated) DEVICE — GLOVE SURG SZ 75 L12IN FNGR THK94MIL TRNSLUC YEL LTX

## (undated) DEVICE — ADAPTER LAB INTMED LUER 17GA

## (undated) DEVICE — UNDERGLOVE SURG SZ 8 BLU LTX FREE SYN POLYISOPRENE POLYMER

## (undated) DEVICE — TOOL F2/8TA23 LEGEND 8CM 2.3MM TAPER: Brand: MIDAS REX™

## (undated) DEVICE — SPONGE,LAP,18"X18",DLX,XR,ST,5/PK,40/PK: Brand: MEDLINE

## (undated) DEVICE — GUIDEWIRE VASC L150CM DIA0.035IN TAPR 3CM HYDRPHLC NIT ANG

## (undated) DEVICE — GLOVE SURG SZ 65 CRM LTX FREE POLYISOPRENE POLYMER BEAD ANTI

## (undated) DEVICE — AGENT HEMSTAT W2XL4IN OXIDIZED REGENERATED CELOS ABSRB SFT

## (undated) DEVICE — TOWEL,OR,DSP,ST,BLUE,STD,4/PK,20PK/CS: Brand: MEDLINE

## (undated) DEVICE — SUTURE VCRL SZ 2-0 L18IN ABSRB UD CT-1 L36MM 1/2 CIR J839D

## (undated) DEVICE — 3M™ TEGADERM™ TRANSPARENT FILM DRESSING FRAME STYLE, 1624W, 2-3/8 IN X 2-3/4 IN (6 CM X 7 CM), 100/CT 4CT/CASE: Brand: 3M™ TEGADERM™

## (undated) DEVICE — INTENDED FOR TISSUE SEPARATION, AND OTHER PROCEDURES THAT REQUIRE A SHARP SURGICAL BLADE TO PUNCTURE OR CUT.: Brand: BARD-PARKER ® STAINLESS STEEL BLADES

## (undated) DEVICE — PROVE COVER: Brand: UNBRANDED

## (undated) DEVICE — FORCEPS BX L240CM WRK CHN 2.8MM STD CAP W/ NDL MIC MESH

## (undated) DEVICE — DRAPE,T,LAPARO,TRANS,STERILE: Brand: MEDLINE

## (undated) DEVICE — MEDICINE CUP, GRADUATED, STER: Brand: MEDLINE

## (undated) DEVICE — CODMAN® SURGICAL PATTIES 1/4" X 1/4" (0.64CM X 0.64CM): Brand: CODMAN®

## (undated) DEVICE — TOWEL,STOP FLAG GOLD N-W: Brand: MEDLINE

## (undated) DEVICE — SUTURE NRLN SZ 4-0 L18IN NONABSORBABLE BLK L13MM TF 1/2 CIR C584D

## (undated) DEVICE — BLADE ES ELASTOMERIC COAT INSUL DURABLE BEND UPTO 90DEG

## (undated) DEVICE — MASC TURNOVER KIT: Brand: MEDLINE INDUSTRIES, INC.

## (undated) DEVICE — GAUZE,SPONGE,4"X4",8PLY,STRL,LF,10/TRAY: Brand: MEDLINE

## (undated) DEVICE — COTTON BALLS, DOUBLE STRUNG: Brand: DEROYAL

## (undated) DEVICE — TOOL 9BA50 LEGEND 9CM 5MM BA: Brand: MIDAS REX

## (undated) DEVICE — Z DISCONTINUED USE 2749457 TUBING SAMP AD W12.5XH8.4IN D9.1IN NSL ORAL SMRT CAPNOLINE

## (undated) DEVICE — SEALANT SURG 13 YR DURA AUTOSPRAY ADHERUS NUS106] SURGICAL ONE]

## (undated) DEVICE — PACK PROCEDURE SURG EXTREMITY MFFOP CUST

## (undated) DEVICE — SSC BONE WAX: Brand: SSC BONE WAX

## (undated) DEVICE — SUTURE MCRYL + SZ 4-0 L27IN ABSRB UD L19MM PS-2 3/8 CIR MCP426H

## (undated) DEVICE — NEURO SPONGES: Brand: DEROYAL

## (undated) DEVICE — ELECTRODE PT RET AD L9FT HI MOIST COND ADH HYDRGEL CORDED

## (undated) DEVICE — DECANTER FLD 9IN ST BG FOR ASEP TRNSF OF FLD

## (undated) DEVICE — GLOVE SURG SZ 65 L12IN FNGR THK79MIL GRN LTX FREE

## (undated) DEVICE — GLOVE SURG SZ 6 L12IN FNGR THK75MIL WHT LTX POLYMER BEAD

## (undated) DEVICE — COVER LT HNDL CAM BLU DISP W/ SURG CTRL

## (undated) DEVICE — NEEDLE HYPO 22GA L1.5IN BLK POLYPR HUB S STL REG BVL STR

## (undated) DEVICE — BANDAGE,GAUZE,BULKEE II,4.5"X4.1YD,STRL: Brand: MEDLINE

## (undated) DEVICE — C-ARM: Brand: UNBRANDED

## (undated) DEVICE — DRESSING GERM DIA1IN CNTR H DIA7MM BLU CHG ANTIMIC PROTCT

## (undated) DEVICE — SUTURE MCRYL SZ 4-0 L27IN ABSRB UD L19MM PS-2 1/2 CIR PRIM Y426H

## (undated) DEVICE — HOOK RETRCT 12MM S STL BLNT E STAY LONE STAR

## (undated) DEVICE — ANES EXTENSION SET 90IN-LF: Brand: MEDLINE INDUSTRIES, INC.

## (undated) DEVICE — SUTURE VCRL + SZ 3-0 L18IN ABSRB UD SH 1/2 CIR TAPERCUT NDL VCP864D

## (undated) DEVICE — CODMAN® SURGICAL PATTIES 3/4" X 3/4" (1.91CM X 1.91CM): Brand: CODMAN®

## (undated) DEVICE — SOLUTION IV 1000ML 0.9% SOD CHL